# Patient Record
Sex: MALE | Race: WHITE | Employment: FULL TIME | URBAN - NONMETROPOLITAN AREA
[De-identification: names, ages, dates, MRNs, and addresses within clinical notes are randomized per-mention and may not be internally consistent; named-entity substitution may affect disease eponyms.]

---

## 2017-09-08 ENCOUNTER — TELEPHONE (OUTPATIENT)
Dept: NEUROSURGERY | Age: 67
End: 2017-09-08

## 2017-11-06 ENCOUNTER — OFFICE VISIT (OUTPATIENT)
Dept: NEUROLOGY | Age: 67
End: 2017-11-06
Payer: MEDICARE

## 2017-11-06 VITALS
DIASTOLIC BLOOD PRESSURE: 87 MMHG | HEIGHT: 68 IN | HEART RATE: 77 BPM | OXYGEN SATURATION: 98 % | WEIGHT: 206.8 LBS | BODY MASS INDEX: 31.34 KG/M2 | SYSTOLIC BLOOD PRESSURE: 131 MMHG

## 2017-11-06 DIAGNOSIS — Z86.718 HISTORY OF DEEP VEIN THROMBOSIS (DVT) OF LOWER EXTREMITY: ICD-10-CM

## 2017-11-06 DIAGNOSIS — R20.2 PARESTHESIA OF BOTH HANDS: Primary | ICD-10-CM

## 2017-11-06 DIAGNOSIS — E53.8 B12 DEFICIENCY: ICD-10-CM

## 2017-11-06 PROCEDURE — 99204 OFFICE O/P NEW MOD 45 MIN: CPT | Performed by: PSYCHIATRY & NEUROLOGY

## 2017-11-06 RX ORDER — APIXABAN 5 MG/1
5 TABLET, FILM COATED ORAL 2 TIMES DAILY
COMMUNITY
Start: 2017-10-27

## 2017-11-06 RX ORDER — OYSTER SHELL CALCIUM WITH VITAMIN D 500; 200 MG/1; [IU]/1
1 TABLET, FILM COATED ORAL DAILY
COMMUNITY

## 2017-11-06 NOTE — PROGRESS NOTES
Chief Complaint   Patient presents with    New Patient     perypheral neuropathy onset 2 months       Noel Aguila is a 74y.o. year old male who is seen for evaluation of paresthesias in the arms and legs. He relates that his symptoms came on only about 2 months ago. He initially noticed some tingling in his toes as well as into his hands. Over time it has come to involve the arms, shoulders and scapular region as well as the calves. He was found to have a low B12 level which was replaced orally. He states that one month ago his repeat B12 level was supranormal and so he discontinued the replacement area he recently admits to order meat spot for nominal and with head movement. Denies any change in any of his symptoms with temperature elevation nor any bowel bladder difficulties. About 10 years ago he developed neck pain and decreased use of the right hand/arm. Cervical traction improve things. He had a similar thing happened 2 years later. He also has a history of pulmonary embolism following a right leg DVT. He is chronically anticoagulated. He otherwise denies any focal neurological signs or symptoms. No new medications. No obvious exacerbating or alleviating factors. Records are reviewed in detail. .    Active Ambulatory Problems     Diagnosis Date Noted    Screening for colon cancer 10/16/2013     Resolved Ambulatory Problems     Diagnosis Date Noted    No Resolved Ambulatory Problems     Past Medical History:   Diagnosis Date    Cervical radiculopathy     HTN (hypertension)     Hyperlipidemia     Pulmonary emboli ([address]        History reviewed. No pertinent surgical history.     Family History   Problem Relation Age of Onset    Hypertension Father     Heart Disease Father     Elevated Lipids Father        No Known Allergies    Social History     Social History    Marital status:      Spouse name: N/A    Number of children: N/A    Years of education: N/A     Occupational History    Not on

## 2017-11-07 ENCOUNTER — TELEPHONE (OUTPATIENT)
Dept: NEUROSURGERY | Age: 67
End: 2017-11-07

## 2017-11-20 ENCOUNTER — HOSPITAL ENCOUNTER (OUTPATIENT)
Dept: MRI IMAGING | Age: 67
Discharge: HOME | End: 2017-11-20
Payer: MEDICARE

## 2017-11-20 DIAGNOSIS — R20.2 PARESTHESIA OF BOTH HANDS: ICD-10-CM

## 2017-11-20 LAB
GFR NON-AFRICAN AMERICAN: 55
PERFORMED ON: ABNORMAL
POC CREATININE: 1.3 MG/DL (ref 0.3–1.3)
POC SAMPLE TYPE: ABNORMAL

## 2017-11-20 PROCEDURE — 72156 MRI NECK SPINE W/O & W/DYE: CPT

## 2017-11-20 PROCEDURE — 6360000004 HC RX CONTRAST MEDICATION: Performed by: PSYCHIATRY & NEUROLOGY

## 2017-11-20 PROCEDURE — 82565 ASSAY OF CREATININE: CPT

## 2017-11-20 PROCEDURE — A9577 INJ MULTIHANCE: HCPCS | Performed by: PSYCHIATRY & NEUROLOGY

## 2017-11-20 RX ADMIN — GADOBENATE DIMEGLUMINE 18.5 ML: 529 INJECTION, SOLUTION INTRAVENOUS at 08:38

## 2017-11-21 ENCOUNTER — TELEPHONE (OUTPATIENT)
Dept: NEUROSURGERY | Age: 67
End: 2017-11-21

## 2017-11-22 ENCOUNTER — TELEPHONE (OUTPATIENT)
Dept: NEUROSURGERY | Age: 67
End: 2017-11-22

## 2017-12-14 ENCOUNTER — OFFICE VISIT (OUTPATIENT)
Dept: NEUROSURGERY | Age: 67
End: 2017-12-14
Payer: MEDICARE

## 2017-12-14 VITALS
BODY MASS INDEX: 31.07 KG/M2 | HEIGHT: 68 IN | SYSTOLIC BLOOD PRESSURE: 135 MMHG | DIASTOLIC BLOOD PRESSURE: 89 MMHG | HEART RATE: 83 BPM | WEIGHT: 205 LBS | OXYGEN SATURATION: 94 %

## 2017-12-14 DIAGNOSIS — M48.02 CERVICAL STENOSIS OF SPINAL CANAL: ICD-10-CM

## 2017-12-14 DIAGNOSIS — M50.30 DDD (DEGENERATIVE DISC DISEASE), CERVICAL: ICD-10-CM

## 2017-12-14 DIAGNOSIS — R20.2 PARESTHESIA OF BOTH HANDS: Primary | ICD-10-CM

## 2017-12-14 DIAGNOSIS — R20.2 PARESTHESIA OF BOTH FEET: ICD-10-CM

## 2017-12-14 DIAGNOSIS — M48.02 FORAMINAL STENOSIS OF CERVICAL REGION: ICD-10-CM

## 2017-12-14 PROCEDURE — 99204 OFFICE O/P NEW MOD 45 MIN: CPT | Performed by: NEUROLOGICAL SURGERY

## 2017-12-14 PROCEDURE — 3017F COLORECTAL CA SCREEN DOC REV: CPT | Performed by: NEUROLOGICAL SURGERY

## 2017-12-14 PROCEDURE — G8427 DOCREV CUR MEDS BY ELIG CLIN: HCPCS | Performed by: NEUROLOGICAL SURGERY

## 2017-12-14 PROCEDURE — 4040F PNEUMOC VAC/ADMIN/RCVD: CPT | Performed by: NEUROLOGICAL SURGERY

## 2017-12-14 PROCEDURE — G8417 CALC BMI ABV UP PARAM F/U: HCPCS | Performed by: NEUROLOGICAL SURGERY

## 2017-12-14 PROCEDURE — 1036F TOBACCO NON-USER: CPT | Performed by: NEUROLOGICAL SURGERY

## 2017-12-14 PROCEDURE — G8484 FLU IMMUNIZE NO ADMIN: HCPCS | Performed by: NEUROLOGICAL SURGERY

## 2017-12-14 PROCEDURE — 1123F ACP DISCUSS/DSCN MKR DOCD: CPT | Performed by: NEUROLOGICAL SURGERY

## 2017-12-14 RX ORDER — ACETAMINOPHEN 160 MG
TABLET,DISINTEGRATING ORAL
COMMUNITY
End: 2017-12-27

## 2017-12-14 RX ORDER — LANOLIN ALCOHOL/MO/W.PET/CERES
2500 CREAM (GRAM) TOPICAL DAILY
COMMUNITY

## 2017-12-14 ASSESSMENT — ENCOUNTER SYMPTOMS
BLURRED VISION: 1
DOUBLE VISION: 0
EYE DISCHARGE: 0
EYE PAIN: 0
PHOTOPHOBIA: 0
GASTROINTESTINAL NEGATIVE: 1
RESPIRATORY NEGATIVE: 1
EYE REDNESS: 0

## 2017-12-14 NOTE — PROGRESS NOTES
 fenofibrate 160 MG tablet Take 160 mg by mouth daily.  therapeutic multivitamin-minerals (THERAGRAN-M) tablet Take 1 tablet by mouth daily.  valsartan (DIOVAN) 160 MG tablet Take 160 mg by mouth daily.  ezetimibe (ZETIA) 10 MG tablet Take 10 mg by mouth daily. No current facility-administered medications for this visit. Allergies:  Review of patient's allergies indicates no known allergies. Social History:   History   Smoking Status    Never Smoker   Smokeless Tobacco    Never Used     History   Alcohol Use No         Family History:   Family History   Problem Relation Age of Onset    Hypertension Father     Heart Disease Father     Elevated Lipids Father        REVIEW OF SYSTEMS:  Review of Systems   Constitutional: Negative for chills, diaphoresis, fever, malaise/fatigue and weight loss. HENT: Negative. Eyes: Positive for blurred vision (right eye - cataract). Negative for double vision, photophobia, pain, discharge and redness. Respiratory: Negative. Cardiovascular: Negative. Gastrointestinal: Negative. Genitourinary: Negative. Musculoskeletal: Negative. Skin: Negative. Neurological: Positive for tingling and weakness. Negative for dizziness, tremors, sensory change, speech change, focal weakness, seizures, loss of consciousness and headaches. Endo/Heme/Allergies: Negative. Psychiatric/Behavioral: Negative. PHYSICAL EXAM:  Vitals:    12/14/17 0829   BP: 135/89   Pulse: 83   SpO2: 94%     Constitutional: appears well-developed and well-nourished.    Eyes  conjunctiva normal.  Pupils react to light  Ear, nose, throat -hearing intact to finger rub, No scars, masses, or lesions over external nose or ears, no atrophy of tongue  Neck-symmetric, no masses noted, no jugular vein distension  Respiration- chest wall appears symmetric, good expansion, normal effort without use of accessory muscles  Musculoskeletal  no significant wasting of muscles noted, no bony deformities, gait no gross ataxia  Extremities-no clubbing, cyanosis or edema  Skin  warm, dry, and intact. No rash, erythema, or pallor. Psychiatric  mood, affect, and behavior appear normal.     Neurologic Examinaiton  Awake, Alert and oriented x 3  Normal speech pattern, following commands  Motor 5/5 all extremities  No deficits to light touch or pinprick sensation  Reflexes are 2+ and symmetric  No clonus or Hoffmans sign  No myofacial tenderness to palpation  Normal Gait pattern  Can walk in Tandem    DATA and IMAGING:    Nursing/pcp notes, imaging, labs, and vitals reviewed. PT,OT and/or speech notes reviewed    No results found for: WBC, HGB, HCT, MCV, PLT  Lab Results   Component Value Date    CREATININE 1.3 11/20/2017    LABGLOM 55 (A) 11/20/2017   No results found for: INR, PROTIME      Narrative   Examination. MRI CERVICAL SPINE W WO CONTRAST   History: Paresthesia of both hands. The multiplanar, multisequence MR imaging of the cervical spine is   performed before and after intravenous contrast enhancement. There is no previous study for comparison. There is reversal of cervical lordosis. The vertebral body heights are normal.   There is loss of height and signal of the intervertebral disks at C5-6   and C6-7 suggesting degeneration/desiccation of the disc. The   remaining intervertebral discs appear unremarkable. There are   discogenic degeneration of the endplates at M6-5. C2, space C2-3. There is moderate facet arthropathy. The neural   foramina spinal canal are normal.   C3, space C3-4. There is mild predominantly central disc bulging. There is bilateral facet arthropathy. The neural foramina spinal canal   are patent. C4, space C4-5. There is mild disc bulging. There is bilateral facet   arthropathy. There is a small type 2 extradural meningeal cyst in the   left common measuring 5 mm in diameter.  There is a type I extradural   meningeal cyst in the right neural foramen measuring 4 mm in diameter. No evidence of a spinal stenosis. C5, space C5-C6. There is moderate diffuse disc bulging. There are   prominent uncinate spurs bilaterally. There is bilateral facet   arthropathy. There is bilateral neural foraminal stenosis. There is   type 2 extradural meningeal cysts, measuring 5 mm in diameter in the   right neural foramen. There is abnormal signal in the intervertebral   disc with moderate irregular contrast enhancement. This may represent   scarring, degeneration or chronic inflammatory process. No evidence of   epidural fluid accumulation or a mass/mass effect. There is a large   anterior osteophyte at this level. No surrounding soft tissues are   fluid accumulation or abnormal enhancement. C6, space C6-C7. There is moderate diffuse disc bulging. There is   bilateral facet arthropathy. There is bilateral neural foraminal   stenosis. No significant spinal stenosis. C7, space C7-T1. There are bilateral extradural meningeal cysts type 2   measuring 5 mm in diameter each. There is a mild disc bulging. No   significant compression on the thecal sac. There is bilateral facet   arthropathy. The size and signal of the cervical spinal cord appear normal. No   focal intrinsic abnormal signal or abnormal enhancement. No evidence   of focal enlargement or expansion. The visualized adina, the medulla oblongata and cerebellum appear   normal.   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 The ligaments are intact. Prevertebral soft tissues are normal.       Impression   Cervical spondylosis most severe at C5-6 and C6-C7. Bilateral neural foraminal stenosis at C5-C6 and C6-C7. The details   are given above. 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 The details are given above. No significant spinal stenosis at any level.    The changes at the disc C5-C6 may represent

## 2017-12-27 ENCOUNTER — OFFICE VISIT (OUTPATIENT)
Dept: NEUROLOGY | Age: 67
End: 2017-12-27
Payer: MEDICARE

## 2017-12-27 VITALS
SYSTOLIC BLOOD PRESSURE: 123 MMHG | BODY MASS INDEX: 32.13 KG/M2 | HEIGHT: 68 IN | DIASTOLIC BLOOD PRESSURE: 79 MMHG | WEIGHT: 212 LBS

## 2017-12-27 DIAGNOSIS — R20.2 PARESTHESIA OF BOTH FEET: ICD-10-CM

## 2017-12-27 DIAGNOSIS — R20.2 PARESTHESIA OF BOTH HANDS: Primary | ICD-10-CM

## 2017-12-27 DIAGNOSIS — M43.02 CERVICAL SPONDYLOLYSIS: ICD-10-CM

## 2017-12-27 LAB — SEDIMENTATION RATE, ERYTHROCYTE: 10 MM/HR (ref 0–15)

## 2017-12-27 PROCEDURE — 99214 OFFICE O/P EST MOD 30 MIN: CPT | Performed by: PSYCHIATRY & NEUROLOGY

## 2017-12-27 PROCEDURE — 1123F ACP DISCUSS/DSCN MKR DOCD: CPT | Performed by: PSYCHIATRY & NEUROLOGY

## 2017-12-27 PROCEDURE — 3017F COLORECTAL CA SCREEN DOC REV: CPT | Performed by: PSYCHIATRY & NEUROLOGY

## 2017-12-27 PROCEDURE — 4040F PNEUMOC VAC/ADMIN/RCVD: CPT | Performed by: PSYCHIATRY & NEUROLOGY

## 2017-12-27 PROCEDURE — 1036F TOBACCO NON-USER: CPT | Performed by: PSYCHIATRY & NEUROLOGY

## 2017-12-27 PROCEDURE — G8417 CALC BMI ABV UP PARAM F/U: HCPCS | Performed by: PSYCHIATRY & NEUROLOGY

## 2017-12-27 PROCEDURE — G8484 FLU IMMUNIZE NO ADMIN: HCPCS | Performed by: PSYCHIATRY & NEUROLOGY

## 2017-12-27 PROCEDURE — G8427 DOCREV CUR MEDS BY ELIG CLIN: HCPCS | Performed by: PSYCHIATRY & NEUROLOGY

## 2017-12-27 RX ORDER — VALSARTAN 40 MG/1
40 TABLET ORAL DAILY
COMMUNITY

## 2017-12-27 NOTE — PROGRESS NOTES
Chief Complaint   Patient presents with    1 Month Follow-Up     neurosurgery wasn't able to do anything       Francois Parks is a 74y.o. year old male who is seen for evaluation of paresthesias in the arms and legs. He relates that his symptoms came on only about 2-3 months ago. He initially noticed some tingling in his toes as well as into his hands. Over time it has come to involve the arms, shoulders and scapular region as well as the calves. He was found to have a low B12 level which was replaced orally. He states that one month ago his repeat B12 level was supranormal and so he discontinued the replacement area he recently admits to order meat spot for nominal and with head movement. Denies any change in any of his symptoms with temperature elevation nor any bowel bladder difficulties. About 10 years ago he developed neck pain and decreased use of the right hand/arm. Cervical traction improve things. He had a similar thing happened 2 years later. He also has a history of pulmonary embolism following a right leg DVT. He is chronically anticoagulated. He otherwise denies any focal neurological signs or symptoms. No new medications. No obvious exacerbating or alleviating factors. Recent cervical MRI films are reviewed. He has some significant neural foraminal stenosis but nothing central. I sent him to Dr. couch in neurosurgery who did not feel that he had a cervical spine problem. He felt that the patient's problem was one more of neuropathy. The patient has had a normal hemoglobin A1c in the past. Records are reviewed in detail. .    Active Ambulatory Problems     Diagnosis Date Noted    Screening for colon cancer 10/16/2013     Resolved Ambulatory Problems     Diagnosis Date Noted    No Resolved Ambulatory Problems     Past Medical History:   Diagnosis Date    Cervical radiculopathy     HTN (hypertension)     Hyperlipidemia     Pulmonary emboli ([address]        History reviewed.  No pertinent surgical history. Family History   Problem Relation Age of Onset    Hypertension Father     Heart Disease Father     Elevated Lipids Father        No Known Allergies    Social History     Social History    Marital status:      Spouse name: N/A    Number of children: N/A    Years of education: N/A     Occupational History    Not on file.      Social History Main Topics    Smoking status: Never Smoker    Smokeless tobacco: Never Used    Alcohol use No    Drug use: No    Sexual activity: Not on file     Other Topics Concern    Not on file     Social History Narrative    No narrative on file       Review of Systems  Constitutional: []Fever []Sweats []Chills [] Recent Injury   [x] Denies all unless marked  HENT:[]Headache  [] Head Injury  [] Sore Throat  [] Ear Pain  [] Dizziness [] Hearing Loss   [x] Denies all unless marked  Spine:  [] Neck pain  [] Back pain  [] Sciaticia  [x] Denies all unless marked  Cardiovascular:[]Chest Pain []Palpitations [] Heart Disease  [x] Denies all unless marked  Pulmonary: []Shortness of Breath []Cough   [x] Denies all unless marked  Gastrointestinal:  []Abdominal Pain  []Blood in Stool  []Diarrhea []Constipation []Nausea  []Vomiting  [x] Denies all unless marked  Genitourinary:  [] Dysuria [] Frequency  [] Incontinence [] Urgency   [x] Denies all unless marked  Musculoskeletal: [] Arthralgia  [] Myalgias [] Muscle cramps  [] Muscle twitches   [x] Denies all unless marked   Extremities:   [] Pain   [] Swelling   [x] Denies all unless marked  Skin:[] Rash  [] Color Change  [x] Denies all unless marked  Neurological:[] Visual Disturbance [] Double Vision [] Slurred Speech [] Trouble swallowing  [] Vertigo [x] Tingling [x] Numbness [] Weakness [] Loss of Balance   [] Loss of Consciousness [] Memory Loss  [] Denies all unless marked  Psychiatric/Behavioral:[] Depression [] Anxiety  [x] Denies all unless marked  Sleep: []  Insomnia [] Sleep Disturbance [] Snoring [] Restless Legs [] Daytime Sleepiness [] Sleep Apnea  [x] Denies all unless marked       Current Outpatient Prescriptions   Medication Sig Dispense Refill    valsartan (DIOVAN) 40 MG tablet Take 40 mg by mouth daily      vitamin B-12 (CYANOCOBALAMIN) 1000 MCG tablet Take 2,500 mcg by mouth daily      ELIQUIS 5 MG TABS tablet Take 5 mg by mouth 2 times daily      calcium-vitamin D (OSCAL-500) 500-200 MG-UNIT per tablet Take 1 tablet by mouth daily      omega-3 acid ethyl esters (LOVAZA) 1 G capsule Take 2 g by mouth 2 times daily.  fenofibrate 160 MG tablet Take 160 mg by mouth daily.  therapeutic multivitamin-minerals (THERAGRAN-M) tablet Take 1 tablet by mouth daily.  ezetimibe (ZETIA) 10 MG tablet Take 10 mg by mouth daily. No current facility-administered medications for this visit. /79   Ht 5' 8\" (1.727 m)   Wt 212 lb (96.2 kg)   BMI 32.23 kg/m²       Constitutional  well developed, well nourished. Eyes  conjunctiva normal.  Pupils react to light  Ear, nose, throat -hearing intact to finger rub No scars, masses, or lesions over external nose or ears, no atrophy of tongue  Neck-symmetric, no masses noted, no jugular vein distension. No bruits noted. Respiration- chest wall appears symmetric, good expansion,   normal effort without use of accessory muscles  Cardiovascular- RRR  Musculoskeletal  no significant wasting of muscles noted, no bony deformities, gait no gross ataxia  Extremities-no clubbing, cyanosis or edema  Skin  warm, dry, and intact. No rash, erythema, or pallor. Psychiatric  mood, affect, and behavior appear normal.      Neurological exam  Awake, alert, fluent oriented x 3 appropriate affect  Attention and concentration appear appropriate  Recent and remote memory appears unremarkable  Speech normal without dysarthria  No clear issues with language of fund of knowledge    Cranial Nerve Exam   CN II- Visual fields grossly unremarkable. VA adequate.  CN III,

## 2017-12-29 LAB
COPPER: 95 UG/DL (ref 70–140)
GLIADIN ANTIBODIES IGA: 12 UNITS (ref 0–19)
GLIADIN ANTIBODIES IGG: 2 UNITS (ref 0–19)

## 2017-12-30 LAB
ALBUMIN SERPL-MCNC: 4.43 G/DL (ref 3.75–5.01)
ALPHA-1-GLOBULIN: 0.3 G/DL (ref 0.19–0.46)
ALPHA-2-GLOBULIN: 0.65 G/DL (ref 0.48–1.05)
BETA GLOBULIN: 0.95 G/DL (ref 0.48–1.1)
GAMMA GLOBULIN: 1.07 G/DL (ref 0.62–1.51)
IMMUNOFIXATION REFLEX: NORMAL
METHYLMALONIC ACID: 0.19 UMOL/L (ref 0–0.4)
SPE/IFE INTERPRETATION: NORMAL
TOTAL PROTEIN: 7.4 G/DL (ref 6–8.3)
VITAMIN B6: 26.1 NMOL/L (ref 20–125)

## 2018-01-02 ENCOUNTER — HOSPITAL ENCOUNTER (OUTPATIENT)
Dept: NEUROLOGY | Age: 68
Discharge: HOME | End: 2018-01-02
Payer: MEDICARE

## 2018-01-02 DIAGNOSIS — R20.2 PARESTHESIA OF BOTH FEET: ICD-10-CM

## 2018-01-09 ENCOUNTER — HOSPITAL ENCOUNTER (OUTPATIENT)
Dept: NEUROLOGY | Age: 68
Discharge: HOME | End: 2018-01-09
Payer: MEDICARE

## 2018-01-09 PROCEDURE — 95913 NRV CNDJ TEST 13/> STUDIES: CPT | Performed by: PSYCHIATRY & NEUROLOGY

## 2018-01-09 PROCEDURE — 95913 NRV CNDJ TEST 13/> STUDIES: CPT

## 2018-01-09 PROCEDURE — 95886 MUSC TEST DONE W/N TEST COMP: CPT

## 2018-01-09 PROCEDURE — 95886 MUSC TEST DONE W/N TEST COMP: CPT | Performed by: PSYCHIATRY & NEUROLOGY

## 2020-10-24 ENCOUNTER — HOSPITAL ENCOUNTER (EMERGENCY)
Facility: HOSPITAL | Age: 70
Discharge: HOME | End: 2020-10-24
Attending: EMERGENCY MEDICINE | Admitting: EMERGENCY MEDICINE

## 2020-10-24 ENCOUNTER — APPOINTMENT (OUTPATIENT)
Dept: GENERAL RADIOLOGY | Facility: HOSPITAL | Age: 70
End: 2020-10-24

## 2020-10-24 VITALS
HEIGHT: 68 IN | HEART RATE: 88 BPM | TEMPERATURE: 98.2 F | WEIGHT: 200 LBS | OXYGEN SATURATION: 98 % | RESPIRATION RATE: 18 BRPM | BODY MASS INDEX: 30.31 KG/M2 | DIASTOLIC BLOOD PRESSURE: 88 MMHG | SYSTOLIC BLOOD PRESSURE: 135 MMHG

## 2020-10-24 DIAGNOSIS — L03.90 CELLULITIS, UNSPECIFIED CELLULITIS SITE: Primary | ICD-10-CM

## 2020-10-24 LAB
ALBUMIN SERPL-MCNC: 4.5 G/DL (ref 3.5–5.2)
ALBUMIN/GLOB SERPL: 1.6 G/DL
ALP SERPL-CCNC: 50 U/L (ref 39–117)
ALT SERPL W P-5'-P-CCNC: 18 U/L (ref 1–41)
ANION GAP SERPL CALCULATED.3IONS-SCNC: 7 MMOL/L (ref 5–15)
AST SERPL-CCNC: 18 U/L (ref 1–40)
BASOPHILS # BLD AUTO: 0.01 10*3/MM3 (ref 0–0.2)
BASOPHILS NFR BLD AUTO: 0.1 % (ref 0–1.5)
BILIRUB SERPL-MCNC: 0.3 MG/DL (ref 0–1.2)
BUN SERPL-MCNC: 22 MG/DL (ref 8–23)
BUN/CREAT SERPL: 16.5 (ref 7–25)
CALCIUM SPEC-SCNC: 9.7 MG/DL (ref 8.6–10.5)
CHLORIDE SERPL-SCNC: 98 MMOL/L (ref 98–107)
CO2 SERPL-SCNC: 31 MMOL/L (ref 22–29)
CREAT SERPL-MCNC: 1.33 MG/DL (ref 0.76–1.27)
DEPRECATED RDW RBC AUTO: 37.7 FL (ref 37–54)
EOSINOPHIL # BLD AUTO: 0.14 10*3/MM3 (ref 0–0.4)
EOSINOPHIL NFR BLD AUTO: 1.8 % (ref 0.3–6.2)
ERYTHROCYTE [DISTWIDTH] IN BLOOD BY AUTOMATED COUNT: 11.9 % (ref 12.3–15.4)
GFR SERPL CREATININE-BSD FRML MDRD: 53 ML/MIN/1.73
GLOBULIN UR ELPH-MCNC: 2.8 GM/DL
GLUCOSE SERPL-MCNC: 134 MG/DL (ref 65–99)
HCT VFR BLD AUTO: 41.4 % (ref 37.5–51)
HGB BLD-MCNC: 14.4 G/DL (ref 13–17.7)
HOLD SPECIMEN: NORMAL
HOLD SPECIMEN: NORMAL
IMM GRANULOCYTES # BLD AUTO: 0.01 10*3/MM3 (ref 0–0.05)
IMM GRANULOCYTES NFR BLD AUTO: 0.1 % (ref 0–0.5)
LYMPHOCYTES # BLD AUTO: 1.87 10*3/MM3 (ref 0.7–3.1)
LYMPHOCYTES NFR BLD AUTO: 23.5 % (ref 19.6–45.3)
MCH RBC QN AUTO: 30 PG (ref 26.6–33)
MCHC RBC AUTO-ENTMCNC: 34.8 G/DL (ref 31.5–35.7)
MCV RBC AUTO: 86.3 FL (ref 79–97)
MONOCYTES # BLD AUTO: 0.95 10*3/MM3 (ref 0.1–0.9)
MONOCYTES NFR BLD AUTO: 11.9 % (ref 5–12)
NEUTROPHILS NFR BLD AUTO: 4.99 10*3/MM3 (ref 1.7–7)
NEUTROPHILS NFR BLD AUTO: 62.6 % (ref 42.7–76)
NRBC BLD AUTO-RTO: 0 /100 WBC (ref 0–0.2)
PLATELET # BLD AUTO: 217 10*3/MM3 (ref 140–450)
PMV BLD AUTO: 10.4 FL (ref 6–12)
POTASSIUM SERPL-SCNC: 4.1 MMOL/L (ref 3.5–5.2)
PROT SERPL-MCNC: 7.3 G/DL (ref 6–8.5)
RBC # BLD AUTO: 4.8 10*6/MM3 (ref 4.14–5.8)
SODIUM SERPL-SCNC: 136 MMOL/L (ref 136–145)
URATE SERPL-MCNC: 9.1 MG/DL (ref 3.4–7)
WBC # BLD AUTO: 7.97 10*3/MM3 (ref 3.4–10.8)
WHOLE BLOOD HOLD SPECIMEN: NORMAL
WHOLE BLOOD HOLD SPECIMEN: NORMAL

## 2020-10-24 PROCEDURE — 99283 EMERGENCY DEPT VISIT LOW MDM: CPT

## 2020-10-24 PROCEDURE — 80053 COMPREHEN METABOLIC PANEL: CPT | Performed by: EMERGENCY MEDICINE

## 2020-10-24 PROCEDURE — 85025 COMPLETE CBC W/AUTO DIFF WBC: CPT | Performed by: EMERGENCY MEDICINE

## 2020-10-24 PROCEDURE — 73630 X-RAY EXAM OF FOOT: CPT

## 2020-10-24 PROCEDURE — 84550 ASSAY OF BLOOD/URIC ACID: CPT | Performed by: EMERGENCY MEDICINE

## 2020-10-24 RX ORDER — DOXYCYCLINE 100 MG/1
100 CAPSULE ORAL 2 TIMES DAILY
Qty: 14 CAPSULE | Refills: 0 | Status: ON HOLD | OUTPATIENT
Start: 2020-10-24 | End: 2021-01-01

## 2020-10-24 RX ORDER — METHYLPREDNISOLONE 4 MG/1
TABLET ORAL
Qty: 21 EACH | Refills: 0 | Status: ON HOLD | OUTPATIENT
Start: 2020-10-24 | End: 2021-01-01

## 2021-01-01 ENCOUNTER — APPOINTMENT (OUTPATIENT)
Dept: GENERAL RADIOLOGY | Facility: HOSPITAL | Age: 71
End: 2021-01-01

## 2021-01-01 ENCOUNTER — APPOINTMENT (OUTPATIENT)
Dept: CARDIOLOGY | Facility: HOSPITAL | Age: 71
End: 2021-01-01

## 2021-01-01 ENCOUNTER — APPOINTMENT (OUTPATIENT)
Dept: ULTRASOUND IMAGING | Facility: HOSPITAL | Age: 71
End: 2021-01-01

## 2021-01-01 ENCOUNTER — APPOINTMENT (OUTPATIENT)
Dept: CT IMAGING | Facility: HOSPITAL | Age: 71
End: 2021-01-01

## 2021-01-01 ENCOUNTER — HOSPITAL ENCOUNTER (INPATIENT)
Facility: HOSPITAL | Age: 71
LOS: 21 days | End: 2021-12-06
Attending: FAMILY MEDICINE | Admitting: FAMILY MEDICINE

## 2021-01-01 VITALS
HEART RATE: 96 BPM | DIASTOLIC BLOOD PRESSURE: 53 MMHG | SYSTOLIC BLOOD PRESSURE: 106 MMHG | BODY MASS INDEX: 29.34 KG/M2 | TEMPERATURE: 96.8 F | HEIGHT: 68 IN | RESPIRATION RATE: 32 BRPM | WEIGHT: 193.56 LBS | OXYGEN SATURATION: 76 %

## 2021-01-01 DIAGNOSIS — E78.5 HYPERLIPIDEMIA, UNSPECIFIED HYPERLIPIDEMIA TYPE: Chronic | ICD-10-CM

## 2021-01-01 DIAGNOSIS — R73.9 HYPERGLYCEMIA: ICD-10-CM

## 2021-01-01 DIAGNOSIS — J12.82 PNEUMONIA DUE TO COVID-19 VIRUS: Primary | ICD-10-CM

## 2021-01-01 DIAGNOSIS — I10 ESSENTIAL HYPERTENSION: Chronic | ICD-10-CM

## 2021-01-01 DIAGNOSIS — I82.4Z1 ACUTE VENOUS EMBOLISM AND THROMBOSIS OF DEEP VESSELS OF DISTAL END OF RIGHT LOWER EXTREMITY (HCC): ICD-10-CM

## 2021-01-01 DIAGNOSIS — U07.1 PNEUMONIA DUE TO COVID-19 VIRUS: Primary | ICD-10-CM

## 2021-01-01 DIAGNOSIS — Z86.711 HISTORY OF PULMONARY EMBOLISM: ICD-10-CM

## 2021-01-01 DIAGNOSIS — R09.02 HYPOXEMIA: ICD-10-CM

## 2021-01-01 DIAGNOSIS — I26.02 ACUTE SADDLE PULMONARY EMBOLISM WITH ACUTE COR PULMONALE (HCC): ICD-10-CM

## 2021-01-01 DIAGNOSIS — J96.01 ACUTE RESPIRATORY FAILURE WITH HYPOXIA (HCC): ICD-10-CM

## 2021-01-01 DIAGNOSIS — R06.00 DYSPNEA, UNSPECIFIED TYPE: ICD-10-CM

## 2021-01-01 LAB
A-A DO2: 16.3 MMHG
A-A DO2: 588.9 MMHG
ALBUMIN SERPL-MCNC: 2.5 G/DL (ref 3.5–5.2)
ALBUMIN SERPL-MCNC: 2.6 G/DL (ref 3.5–5.2)
ALBUMIN SERPL-MCNC: 2.6 G/DL (ref 3.5–5.2)
ALBUMIN SERPL-MCNC: 2.7 G/DL (ref 3.5–5.2)
ALBUMIN SERPL-MCNC: 2.8 G/DL (ref 3.5–5.2)
ALBUMIN SERPL-MCNC: 2.9 G/DL (ref 3.5–5.2)
ALBUMIN SERPL-MCNC: 2.9 G/DL (ref 3.5–5.2)
ALBUMIN SERPL-MCNC: 3 G/DL (ref 3.5–5.2)
ALBUMIN SERPL-MCNC: 3.2 G/DL (ref 3.5–5.2)
ALBUMIN SERPL-MCNC: 3.2 G/DL (ref 3.5–5.2)
ALBUMIN SERPL-MCNC: 3.3 G/DL (ref 3.5–5.2)
ALBUMIN SERPL-MCNC: 3.4 G/DL (ref 3.5–5.2)
ALBUMIN SERPL-MCNC: 3.4 G/DL (ref 3.5–5.2)
ALBUMIN SERPL-MCNC: 3.5 G/DL (ref 3.5–5.2)
ALBUMIN SERPL-MCNC: 3.5 G/DL (ref 3.5–5.2)
ALBUMIN SERPL-MCNC: 3.6 G/DL (ref 3.5–5.2)
ALBUMIN SERPL-MCNC: 3.7 G/DL (ref 3.5–5.2)
ALBUMIN SERPL-MCNC: 3.7 G/DL (ref 3.5–5.2)
ALBUMIN/GLOB SERPL: 0.9 G/DL
ALBUMIN/GLOB SERPL: 0.9 G/DL
ALBUMIN/GLOB SERPL: 1 G/DL
ALBUMIN/GLOB SERPL: 1.1 G/DL
ALBUMIN/GLOB SERPL: 1.2 G/DL
ALBUMIN/GLOB SERPL: 1.3 G/DL
ALBUMIN/GLOB SERPL: 1.4 G/DL
ALP SERPL-CCNC: 105 U/L (ref 39–117)
ALP SERPL-CCNC: 112 U/L (ref 39–117)
ALP SERPL-CCNC: 113 U/L (ref 39–117)
ALP SERPL-CCNC: 116 U/L (ref 39–117)
ALP SERPL-CCNC: 132 U/L (ref 39–117)
ALP SERPL-CCNC: 147 U/L (ref 39–117)
ALP SERPL-CCNC: 149 U/L (ref 39–117)
ALP SERPL-CCNC: 154 U/L (ref 39–117)
ALP SERPL-CCNC: 54 U/L (ref 39–117)
ALP SERPL-CCNC: 55 U/L (ref 39–117)
ALP SERPL-CCNC: 60 U/L (ref 39–117)
ALP SERPL-CCNC: 62 U/L (ref 39–117)
ALP SERPL-CCNC: 63 U/L (ref 39–117)
ALP SERPL-CCNC: 79 U/L (ref 39–117)
ALP SERPL-CCNC: 79 U/L (ref 39–117)
ALP SERPL-CCNC: 89 U/L (ref 39–117)
ALP SERPL-CCNC: 91 U/L (ref 39–117)
ALP SERPL-CCNC: 98 U/L (ref 39–117)
ALT SERPL W P-5'-P-CCNC: 22 U/L (ref 1–41)
ALT SERPL W P-5'-P-CCNC: 22 U/L (ref 1–41)
ALT SERPL W P-5'-P-CCNC: 23 U/L (ref 1–41)
ALT SERPL W P-5'-P-CCNC: 25 U/L (ref 1–41)
ALT SERPL W P-5'-P-CCNC: 26 U/L (ref 1–41)
ALT SERPL W P-5'-P-CCNC: 27 U/L (ref 1–41)
ALT SERPL W P-5'-P-CCNC: 28 U/L (ref 1–41)
ALT SERPL W P-5'-P-CCNC: 28 U/L (ref 1–41)
ALT SERPL W P-5'-P-CCNC: 29 U/L (ref 1–41)
ALT SERPL W P-5'-P-CCNC: 31 U/L (ref 1–41)
ALT SERPL W P-5'-P-CCNC: 32 U/L (ref 1–41)
ALT SERPL W P-5'-P-CCNC: 37 U/L (ref 1–41)
ALT SERPL W P-5'-P-CCNC: 41 U/L (ref 1–41)
ALT SERPL W P-5'-P-CCNC: 50 U/L (ref 1–41)
ALT SERPL W P-5'-P-CCNC: 60 U/L (ref 1–41)
ALT SERPL W P-5'-P-CCNC: >7000 U/L (ref 1–41)
ALT SERPL W P-5'-P-CCNC: >7000 U/L (ref 1–41)
ANION GAP SERPL CALCULATED.3IONS-SCNC: 10 MMOL/L (ref 5–15)
ANION GAP SERPL CALCULATED.3IONS-SCNC: 10 MMOL/L (ref 5–15)
ANION GAP SERPL CALCULATED.3IONS-SCNC: 11 MMOL/L (ref 5–15)
ANION GAP SERPL CALCULATED.3IONS-SCNC: 12 MMOL/L (ref 5–15)
ANION GAP SERPL CALCULATED.3IONS-SCNC: 13 MMOL/L (ref 5–15)
ANION GAP SERPL CALCULATED.3IONS-SCNC: 14 MMOL/L (ref 5–15)
ANION GAP SERPL CALCULATED.3IONS-SCNC: 15 MMOL/L (ref 5–15)
ANION GAP SERPL CALCULATED.3IONS-SCNC: 16 MMOL/L (ref 5–15)
ANION GAP SERPL CALCULATED.3IONS-SCNC: 18 MMOL/L (ref 5–15)
ANION GAP SERPL CALCULATED.3IONS-SCNC: 19 MMOL/L (ref 5–15)
ANION GAP SERPL CALCULATED.3IONS-SCNC: 19 MMOL/L (ref 5–15)
ANION GAP SERPL CALCULATED.3IONS-SCNC: 20 MMOL/L (ref 5–15)
ANION GAP SERPL CALCULATED.3IONS-SCNC: 21 MMOL/L (ref 5–15)
ANION GAP SERPL CALCULATED.3IONS-SCNC: 5 MMOL/L (ref 5–15)
ANION GAP SERPL CALCULATED.3IONS-SCNC: 6 MMOL/L (ref 5–15)
ANION GAP SERPL CALCULATED.3IONS-SCNC: 6 MMOL/L (ref 5–15)
ANION GAP SERPL CALCULATED.3IONS-SCNC: 7 MMOL/L (ref 5–15)
ANION GAP SERPL CALCULATED.3IONS-SCNC: 8 MMOL/L (ref 5–15)
ANION GAP SERPL CALCULATED.3IONS-SCNC: 8 MMOL/L (ref 5–15)
ANION GAP SERPL CALCULATED.3IONS-SCNC: 9 MMOL/L (ref 5–15)
ANISOCYTOSIS BLD QL: ABNORMAL
ARTERIAL PATENCY WRIST A: ABNORMAL
ARTERIAL PATENCY WRIST A: POSITIVE
AST SERPL-CCNC: 25 U/L (ref 1–40)
AST SERPL-CCNC: 29 U/L (ref 1–40)
AST SERPL-CCNC: 30 U/L (ref 1–40)
AST SERPL-CCNC: 31 U/L (ref 1–40)
AST SERPL-CCNC: 34 U/L (ref 1–40)
AST SERPL-CCNC: 35 U/L (ref 1–40)
AST SERPL-CCNC: 36 U/L (ref 1–40)
AST SERPL-CCNC: 37 U/L (ref 1–40)
AST SERPL-CCNC: 38 U/L (ref 1–40)
AST SERPL-CCNC: 38 U/L (ref 1–40)
AST SERPL-CCNC: 40 U/L (ref 1–40)
AST SERPL-CCNC: 41 U/L (ref 1–40)
AST SERPL-CCNC: 47 U/L (ref 1–40)
AST SERPL-CCNC: 47 U/L (ref 1–40)
AST SERPL-CCNC: 49 U/L (ref 1–40)
AST SERPL-CCNC: 50 U/L (ref 1–40)
AST SERPL-CCNC: >7000 U/L (ref 1–40)
AST SERPL-CCNC: >7000 U/L (ref 1–40)
ATMOSPHERIC PRESS: 745 MMHG
ATMOSPHERIC PRESS: 749 MMHG
ATMOSPHERIC PRESS: 750 MMHG
ATMOSPHERIC PRESS: 751 MMHG
ATMOSPHERIC PRESS: 751 MMHG
ATMOSPHERIC PRESS: 752 MMHG
ATMOSPHERIC PRESS: 753 MMHG
ATMOSPHERIC PRESS: 754 MMHG
ATMOSPHERIC PRESS: 754 MMHG
ATMOSPHERIC PRESS: 755 MMHG
ATMOSPHERIC PRESS: 759 MMHG
B PARAPERT DNA SPEC QL NAA+PROBE: NOT DETECTED
B PERT DNA SPEC QL NAA+PROBE: NOT DETECTED
BACTERIA SPEC AEROBE CULT: NORMAL
BACTERIA SPEC RESP CULT: ABNORMAL
BASE EXCESS BLDA CALC-SCNC: -0.3 MMOL/L (ref 0–2)
BASE EXCESS BLDA CALC-SCNC: -0.5 MMOL/L (ref 0–2)
BASE EXCESS BLDA CALC-SCNC: -1.2 MMOL/L (ref 0–2)
BASE EXCESS BLDA CALC-SCNC: -1.7 MMOL/L (ref 0–2)
BASE EXCESS BLDA CALC-SCNC: -1.7 MMOL/L (ref 0–2)
BASE EXCESS BLDA CALC-SCNC: -3.4 MMOL/L (ref 0–2)
BASE EXCESS BLDA CALC-SCNC: -8 MMOL/L (ref 0–2)
BASE EXCESS BLDA CALC-SCNC: -9.7 MMOL/L (ref 0–2)
BASE EXCESS BLDA CALC-SCNC: 1.3 MMOL/L (ref 0–2)
BASE EXCESS BLDA CALC-SCNC: 1.3 MMOL/L (ref 0–2)
BASE EXCESS BLDA CALC-SCNC: 2 MMOL/L (ref 0–2)
BASE EXCESS BLDA CALC-SCNC: 2.5 MMOL/L (ref 0–2)
BASE EXCESS BLDA CALC-SCNC: 2.9 MMOL/L (ref 0–2)
BASE EXCESS BLDA CALC-SCNC: 3.9 MMOL/L (ref 0–2)
BASE EXCESS BLDA CALC-SCNC: 4 MMOL/L (ref 0–2)
BASE EXCESS BLDA CALC-SCNC: 8.5 MMOL/L (ref 0–2)
BASO STIPL COARSE BLD QL SMEAR: ABNORMAL
BASO STIPL COARSE BLD QL SMEAR: ABNORMAL
BASOPHILS # BLD AUTO: 0 10*3/MM3 (ref 0–0.2)
BASOPHILS # BLD AUTO: 0.01 10*3/MM3 (ref 0–0.2)
BASOPHILS # BLD AUTO: 0.02 10*3/MM3 (ref 0–0.2)
BASOPHILS # BLD AUTO: 0.03 10*3/MM3 (ref 0–0.2)
BASOPHILS # BLD AUTO: 0.04 10*3/MM3 (ref 0–0.2)
BASOPHILS # BLD AUTO: 0.06 10*3/MM3 (ref 0–0.2)
BASOPHILS # BLD AUTO: 0.07 10*3/MM3 (ref 0–0.2)
BASOPHILS # BLD MANUAL: 0.13 10*3/MM3 (ref 0–0.2)
BASOPHILS NFR BLD AUTO: 0 % (ref 0–1.5)
BASOPHILS NFR BLD AUTO: 0.1 % (ref 0–1.5)
BASOPHILS NFR BLD AUTO: 0.2 % (ref 0–1.5)
BASOPHILS NFR BLD AUTO: 0.3 % (ref 0–1.5)
BASOPHILS NFR BLD MANUAL: 1 % (ref 0–1.5)
BDY SITE: ABNORMAL
BH CV ECHO MEAS - AO MAX PG (FULL): 3.3 MMHG
BH CV ECHO MEAS - AO MAX PG: 8.1 MMHG
BH CV ECHO MEAS - AO MEAN PG (FULL): 1 MMHG
BH CV ECHO MEAS - AO MEAN PG: 3 MMHG
BH CV ECHO MEAS - AO ROOT AREA (BSA CORRECTED): 1.9
BH CV ECHO MEAS - AO ROOT AREA: 11.9 CM^2
BH CV ECHO MEAS - AO ROOT DIAM: 3.9 CM
BH CV ECHO MEAS - AO V2 MAX: 142 CM/SEC
BH CV ECHO MEAS - AO V2 MEAN: 78.7 CM/SEC
BH CV ECHO MEAS - AO V2 VTI: 19.6 CM
BH CV ECHO MEAS - AVA(I,A): 4.1 CM^2
BH CV ECHO MEAS - AVA(I,D): 4.1 CM^2
BH CV ECHO MEAS - AVA(V,A): 3.5 CM^2
BH CV ECHO MEAS - AVA(V,D): 3.5 CM^2
BH CV ECHO MEAS - BSA(HAYCOCK): 2.1 M^2
BH CV ECHO MEAS - BSA: 2 M^2
BH CV ECHO MEAS - BZI_BMI: 29.2 KILOGRAMS/M^2
BH CV ECHO MEAS - BZI_METRIC_HEIGHT: 172.7 CM
BH CV ECHO MEAS - BZI_METRIC_WEIGHT: 87.1 KG
BH CV ECHO MEAS - EDV(CUBED): 151.4 ML
BH CV ECHO MEAS - EDV(MOD-SP4): 124 ML
BH CV ECHO MEAS - EDV(TEICH): 137.1 ML
BH CV ECHO MEAS - EF(CUBED): 75.8 %
BH CV ECHO MEAS - EF(MOD-SP4): 66.9 %
BH CV ECHO MEAS - EF(TEICH): 67.3 %
BH CV ECHO MEAS - ESV(CUBED): 36.6 ML
BH CV ECHO MEAS - ESV(MOD-SP4): 41.1 ML
BH CV ECHO MEAS - ESV(TEICH): 44.8 ML
BH CV ECHO MEAS - FS: 37.7 %
BH CV ECHO MEAS - IVS/LVPW: 1
BH CV ECHO MEAS - IVSD: 1.3 CM
BH CV ECHO MEAS - LA 3D VOL INDEX: 31
BH CV ECHO MEAS - LA DIMENSION: 3.9 CM
BH CV ECHO MEAS - LA/AO: 1
BH CV ECHO MEAS - LV DIASTOLIC VOL/BSA (35-75): 61.7 ML/M^2
BH CV ECHO MEAS - LV MASS(C)D: 269.4 GRAMS
BH CV ECHO MEAS - LV MASS(C)DI: 134.1 GRAMS/M^2
BH CV ECHO MEAS - LV MAX PG: 4.8 MMHG
BH CV ECHO MEAS - LV MEAN PG: 2 MMHG
BH CV ECHO MEAS - LV SYSTOLIC VOL/BSA (12-30): 20.5 ML/M^2
BH CV ECHO MEAS - LV V1 MAX: 109 CM/SEC
BH CV ECHO MEAS - LV V1 MEAN: 66.4 CM/SEC
BH CV ECHO MEAS - LV V1 VTI: 17.9 CM
BH CV ECHO MEAS - LVIDD: 5.3 CM
BH CV ECHO MEAS - LVIDS: 3.3 CM
BH CV ECHO MEAS - LVLD AP4: 8.7 CM
BH CV ECHO MEAS - LVLS AP4: 6 CM
BH CV ECHO MEAS - LVOT AREA (M): 4.5 CM^2
BH CV ECHO MEAS - LVOT AREA: 4.5 CM^2
BH CV ECHO MEAS - LVOT DIAM: 2.4 CM
BH CV ECHO MEAS - LVPWD: 1.2 CM
BH CV ECHO MEAS - MV A MAX VEL: 93.1 CM/SEC
BH CV ECHO MEAS - MV DEC SLOPE: 265 CM/SEC^2
BH CV ECHO MEAS - MV DEC TIME: 0.3 SEC
BH CV ECHO MEAS - MV E MAX VEL: 67.9 CM/SEC
BH CV ECHO MEAS - MV E/A: 0.73
BH CV ECHO MEAS - MV P1/2T MAX VEL: 74.7 CM/SEC
BH CV ECHO MEAS - MV P1/2T: 82.6 MSEC
BH CV ECHO MEAS - MVA P1/2T LCG: 2.9 CM^2
BH CV ECHO MEAS - MVA(P1/2T): 2.7 CM^2
BH CV ECHO MEAS - RVDD: 3.6 CM
BH CV ECHO MEAS - SI(AO): 116.6 ML/M^2
BH CV ECHO MEAS - SI(CUBED): 57.2 ML/M^2
BH CV ECHO MEAS - SI(LVOT): 40.3 ML/M^2
BH CV ECHO MEAS - SI(MOD-SP4): 41.3 ML/M^2
BH CV ECHO MEAS - SI(TEICH): 46 ML/M^2
BH CV ECHO MEAS - SV(AO): 234.1 ML
BH CV ECHO MEAS - SV(CUBED): 114.8 ML
BH CV ECHO MEAS - SV(LVOT): 81 ML
BH CV ECHO MEAS - SV(MOD-SP4): 82.9 ML
BH CV ECHO MEAS - SV(TEICH): 92.3 ML
BILIRUB SERPL-MCNC: 0.3 MG/DL (ref 0–1.2)
BILIRUB SERPL-MCNC: 0.4 MG/DL (ref 0–1.2)
BILIRUB SERPL-MCNC: 0.5 MG/DL (ref 0–1.2)
BILIRUB SERPL-MCNC: 0.6 MG/DL (ref 0–1.2)
BILIRUB SERPL-MCNC: 0.7 MG/DL (ref 0–1.2)
BILIRUB SERPL-MCNC: 1.7 MG/DL (ref 0–1.2)
BILIRUB SERPL-MCNC: 2.7 MG/DL (ref 0–1.2)
BILIRUB UR QL STRIP: NEGATIVE
BODY TEMPERATURE: 37 C
BUN SERPL-MCNC: 101 MG/DL (ref 8–23)
BUN SERPL-MCNC: 105 MG/DL (ref 8–23)
BUN SERPL-MCNC: 108 MG/DL (ref 8–23)
BUN SERPL-MCNC: 109 MG/DL (ref 8–23)
BUN SERPL-MCNC: 109 MG/DL (ref 8–23)
BUN SERPL-MCNC: 110 MG/DL (ref 8–23)
BUN SERPL-MCNC: 111 MG/DL (ref 8–23)
BUN SERPL-MCNC: 112 MG/DL (ref 8–23)
BUN SERPL-MCNC: 122 MG/DL (ref 8–23)
BUN SERPL-MCNC: 127 MG/DL (ref 8–23)
BUN SERPL-MCNC: 24 MG/DL (ref 8–23)
BUN SERPL-MCNC: 31 MG/DL (ref 8–23)
BUN SERPL-MCNC: 33 MG/DL (ref 8–23)
BUN SERPL-MCNC: 33 MG/DL (ref 8–23)
BUN SERPL-MCNC: 36 MG/DL (ref 8–23)
BUN SERPL-MCNC: 37 MG/DL (ref 8–23)
BUN SERPL-MCNC: 44 MG/DL (ref 8–23)
BUN SERPL-MCNC: 55 MG/DL (ref 8–23)
BUN SERPL-MCNC: 62 MG/DL (ref 8–23)
BUN SERPL-MCNC: 64 MG/DL (ref 8–23)
BUN SERPL-MCNC: 69 MG/DL (ref 8–23)
BUN SERPL-MCNC: 80 MG/DL (ref 8–23)
BUN SERPL-MCNC: 83 MG/DL (ref 8–23)
BUN SERPL-MCNC: 86 MG/DL (ref 8–23)
BUN SERPL-MCNC: 87 MG/DL (ref 8–23)
BUN SERPL-MCNC: 87 MG/DL (ref 8–23)
BUN SERPL-MCNC: 89 MG/DL (ref 8–23)
BUN SERPL-MCNC: 90 MG/DL (ref 8–23)
BUN SERPL-MCNC: 93 MG/DL (ref 8–23)
BUN SERPL-MCNC: 97 MG/DL (ref 8–23)
BUN SERPL-MCNC: 98 MG/DL (ref 8–23)
BUN SERPL-MCNC: 99 MG/DL (ref 8–23)
BUN/CREAT SERPL: 15.2 (ref 7–25)
BUN/CREAT SERPL: 31.1 (ref 7–25)
BUN/CREAT SERPL: 32 (ref 7–25)
BUN/CREAT SERPL: 32.7 (ref 7–25)
BUN/CREAT SERPL: 33.2 (ref 7–25)
BUN/CREAT SERPL: 34.6 (ref 7–25)
BUN/CREAT SERPL: 35 (ref 7–25)
BUN/CREAT SERPL: 37.4 (ref 7–25)
BUN/CREAT SERPL: 39.2 (ref 7–25)
BUN/CREAT SERPL: 39.6 (ref 7–25)
BUN/CREAT SERPL: 40.5 (ref 7–25)
BUN/CREAT SERPL: 42.6 (ref 7–25)
BUN/CREAT SERPL: 46.2 (ref 7–25)
BUN/CREAT SERPL: 48.4 (ref 7–25)
BUN/CREAT SERPL: 48.4 (ref 7–25)
BUN/CREAT SERPL: 49.6 (ref 7–25)
BUN/CREAT SERPL: 51.5 (ref 7–25)
BUN/CREAT SERPL: 51.9 (ref 7–25)
BUN/CREAT SERPL: 61.5 (ref 7–25)
BUN/CREAT SERPL: 62.9 (ref 7–25)
BUN/CREAT SERPL: 68.5 (ref 7–25)
BUN/CREAT SERPL: 70.8 (ref 7–25)
BUN/CREAT SERPL: 73 (ref 7–25)
BUN/CREAT SERPL: 73.8 (ref 7–25)
BUN/CREAT SERPL: 74.4 (ref 7–25)
BUN/CREAT SERPL: 74.8 (ref 7–25)
BUN/CREAT SERPL: 76 (ref 7–25)
BUN/CREAT SERPL: 77 (ref 7–25)
BUN/CREAT SERPL: 79.4 (ref 7–25)
BUN/CREAT SERPL: 80.2 (ref 7–25)
BUN/CREAT SERPL: 83.7 (ref 7–25)
BUN/CREAT SERPL: 84.6 (ref 7–25)
BURR CELLS BLD QL SMEAR: ABNORMAL
BURR CELLS BLD QL SMEAR: ABNORMAL
C PNEUM DNA NPH QL NAA+NON-PROBE: NOT DETECTED
CALCIUM SPEC-SCNC: 6.5 MG/DL (ref 8.6–10.5)
CALCIUM SPEC-SCNC: 6.8 MG/DL (ref 8.6–10.5)
CALCIUM SPEC-SCNC: 7 MG/DL (ref 8.6–10.5)
CALCIUM SPEC-SCNC: 7 MG/DL (ref 8.6–10.5)
CALCIUM SPEC-SCNC: 7.1 MG/DL (ref 8.6–10.5)
CALCIUM SPEC-SCNC: 7.2 MG/DL (ref 8.6–10.5)
CALCIUM SPEC-SCNC: 7.3 MG/DL (ref 8.6–10.5)
CALCIUM SPEC-SCNC: 7.3 MG/DL (ref 8.6–10.5)
CALCIUM SPEC-SCNC: 7.4 MG/DL (ref 8.6–10.5)
CALCIUM SPEC-SCNC: 7.4 MG/DL (ref 8.6–10.5)
CALCIUM SPEC-SCNC: 7.5 MG/DL (ref 8.6–10.5)
CALCIUM SPEC-SCNC: 7.5 MG/DL (ref 8.6–10.5)
CALCIUM SPEC-SCNC: 7.6 MG/DL (ref 8.6–10.5)
CALCIUM SPEC-SCNC: 7.6 MG/DL (ref 8.6–10.5)
CALCIUM SPEC-SCNC: 7.7 MG/DL (ref 8.6–10.5)
CALCIUM SPEC-SCNC: 7.8 MG/DL (ref 8.6–10.5)
CALCIUM SPEC-SCNC: 7.9 MG/DL (ref 8.6–10.5)
CALCIUM SPEC-SCNC: 7.9 MG/DL (ref 8.6–10.5)
CALCIUM SPEC-SCNC: 8 MG/DL (ref 8.6–10.5)
CALCIUM SPEC-SCNC: 8.1 MG/DL (ref 8.6–10.5)
CALCIUM SPEC-SCNC: 8.3 MG/DL (ref 8.6–10.5)
CALCIUM SPEC-SCNC: 8.4 MG/DL (ref 8.6–10.5)
CALCIUM SPEC-SCNC: 8.4 MG/DL (ref 8.6–10.5)
CALCIUM SPEC-SCNC: 8.6 MG/DL (ref 8.6–10.5)
CALCIUM SPEC-SCNC: 8.8 MG/DL (ref 8.6–10.5)
CALCIUM SPEC-SCNC: 9.1 MG/DL (ref 8.6–10.5)
CHLORIDE SERPL-SCNC: 100 MMOL/L (ref 98–107)
CHLORIDE SERPL-SCNC: 101 MMOL/L (ref 98–107)
CHLORIDE SERPL-SCNC: 102 MMOL/L (ref 98–107)
CHLORIDE SERPL-SCNC: 104 MMOL/L (ref 98–107)
CHLORIDE SERPL-SCNC: 105 MMOL/L (ref 98–107)
CHLORIDE SERPL-SCNC: 105 MMOL/L (ref 98–107)
CHLORIDE SERPL-SCNC: 106 MMOL/L (ref 98–107)
CHLORIDE SERPL-SCNC: 107 MMOL/L (ref 98–107)
CHLORIDE SERPL-SCNC: 107 MMOL/L (ref 98–107)
CHLORIDE SERPL-SCNC: 91 MMOL/L (ref 98–107)
CHLORIDE SERPL-SCNC: 91 MMOL/L (ref 98–107)
CHLORIDE SERPL-SCNC: 92 MMOL/L (ref 98–107)
CHLORIDE SERPL-SCNC: 93 MMOL/L (ref 98–107)
CHLORIDE SERPL-SCNC: 94 MMOL/L (ref 98–107)
CHLORIDE SERPL-SCNC: 95 MMOL/L (ref 98–107)
CHLORIDE SERPL-SCNC: 95 MMOL/L (ref 98–107)
CHLORIDE SERPL-SCNC: 96 MMOL/L (ref 98–107)
CHLORIDE SERPL-SCNC: 96 MMOL/L (ref 98–107)
CHLORIDE SERPL-SCNC: 97 MMOL/L (ref 98–107)
CHLORIDE SERPL-SCNC: 98 MMOL/L (ref 98–107)
CHLORIDE SERPL-SCNC: 99 MMOL/L (ref 98–107)
CHOLEST SERPL-MCNC: 180 MG/DL (ref 0–200)
CLARITY UR: ABNORMAL
CLUMPED PLATELETS: PRESENT
CO2 SERPL-SCNC: 14 MMOL/L (ref 22–29)
CO2 SERPL-SCNC: 16 MMOL/L (ref 22–29)
CO2 SERPL-SCNC: 17 MMOL/L (ref 22–29)
CO2 SERPL-SCNC: 18 MMOL/L (ref 22–29)
CO2 SERPL-SCNC: 21 MMOL/L (ref 22–29)
CO2 SERPL-SCNC: 22 MMOL/L (ref 22–29)
CO2 SERPL-SCNC: 23 MMOL/L (ref 22–29)
CO2 SERPL-SCNC: 24 MMOL/L (ref 22–29)
CO2 SERPL-SCNC: 25 MMOL/L (ref 22–29)
CO2 SERPL-SCNC: 25 MMOL/L (ref 22–29)
CO2 SERPL-SCNC: 26 MMOL/L (ref 22–29)
CO2 SERPL-SCNC: 27 MMOL/L (ref 22–29)
CO2 SERPL-SCNC: 28 MMOL/L (ref 22–29)
CO2 SERPL-SCNC: 28 MMOL/L (ref 22–29)
CO2 SERPL-SCNC: 29 MMOL/L (ref 22–29)
CO2 SERPL-SCNC: 30 MMOL/L (ref 22–29)
CO2 SERPL-SCNC: 31 MMOL/L (ref 22–29)
CO2 SERPL-SCNC: 32 MMOL/L (ref 22–29)
CO2 SERPL-SCNC: 33 MMOL/L (ref 22–29)
COHGB MFR BLD: 1.1 % (ref 0–5)
COHGB MFR BLD: 3.2 % (ref 0–5)
COLOR UR: YELLOW
CREAT SERPL-MCNC: 0.97 MG/DL (ref 0.76–1.27)
CREAT SERPL-MCNC: 1.01 MG/DL (ref 0.76–1.27)
CREAT SERPL-MCNC: 1.03 MG/DL (ref 0.76–1.27)
CREAT SERPL-MCNC: 1.04 MG/DL (ref 0.76–1.27)
CREAT SERPL-MCNC: 1.06 MG/DL (ref 0.76–1.27)
CREAT SERPL-MCNC: 1.07 MG/DL (ref 0.76–1.27)
CREAT SERPL-MCNC: 1.11 MG/DL (ref 0.76–1.27)
CREAT SERPL-MCNC: 1.13 MG/DL (ref 0.76–1.27)
CREAT SERPL-MCNC: 1.15 MG/DL (ref 0.76–1.27)
CREAT SERPL-MCNC: 1.17 MG/DL (ref 0.76–1.27)
CREAT SERPL-MCNC: 1.19 MG/DL (ref 0.76–1.27)
CREAT SERPL-MCNC: 1.21 MG/DL (ref 0.76–1.27)
CREAT SERPL-MCNC: 1.21 MG/DL (ref 0.76–1.27)
CREAT SERPL-MCNC: 1.22 MG/DL (ref 0.76–1.27)
CREAT SERPL-MCNC: 1.26 MG/DL (ref 0.76–1.27)
CREAT SERPL-MCNC: 1.28 MG/DL (ref 0.76–1.27)
CREAT SERPL-MCNC: 1.29 MG/DL (ref 0.76–1.27)
CREAT SERPL-MCNC: 1.29 MG/DL (ref 0.76–1.27)
CREAT SERPL-MCNC: 1.36 MG/DL (ref 0.76–1.27)
CREAT SERPL-MCNC: 1.54 MG/DL (ref 0.76–1.27)
CREAT SERPL-MCNC: 1.58 MG/DL (ref 0.76–1.27)
CREAT SERPL-MCNC: 1.6 MG/DL (ref 0.76–1.27)
CREAT SERPL-MCNC: 1.62 MG/DL (ref 0.76–1.27)
CREAT SERPL-MCNC: 1.75 MG/DL (ref 0.76–1.27)
CREAT SERPL-MCNC: 1.76 MG/DL (ref 0.76–1.27)
CREAT SERPL-MCNC: 1.82 MG/DL (ref 0.76–1.27)
CREAT SERPL-MCNC: 2.04 MG/DL (ref 0.76–1.27)
CREAT SERPL-MCNC: 2.25 MG/DL (ref 0.76–1.27)
CREAT SERPL-MCNC: 2.37 MG/DL (ref 0.76–1.27)
CREAT SERPL-MCNC: 2.41 MG/DL (ref 0.76–1.27)
CREAT SERPL-MCNC: 3.01 MG/DL (ref 0.76–1.27)
CREAT SERPL-MCNC: 3.4 MG/DL (ref 0.76–1.27)
CRP SERPL-MCNC: 0.68 MG/DL (ref 0–0.5)
CRP SERPL-MCNC: 1.08 MG/DL (ref 0–0.5)
CRP SERPL-MCNC: 1.47 MG/DL (ref 0–0.5)
CRP SERPL-MCNC: 1.47 MG/DL (ref 0–0.5)
CRP SERPL-MCNC: 1.5 MG/DL (ref 0–0.5)
CRP SERPL-MCNC: 1.62 MG/DL (ref 0–0.5)
CRP SERPL-MCNC: 1.65 MG/DL (ref 0–0.5)
CRP SERPL-MCNC: 17.65 MG/DL (ref 0–0.5)
CRP SERPL-MCNC: 2.02 MG/DL (ref 0–0.5)
CRP SERPL-MCNC: 2.04 MG/DL (ref 0–0.5)
CRP SERPL-MCNC: 2.05 MG/DL (ref 0–0.5)
CRP SERPL-MCNC: 2.12 MG/DL (ref 0–0.5)
CRP SERPL-MCNC: 2.4 MG/DL (ref 0–0.5)
CRP SERPL-MCNC: 2.94 MG/DL (ref 0–0.5)
CRP SERPL-MCNC: 38.42 MG/DL (ref 0–0.5)
CRP SERPL-MCNC: 4.96 MG/DL (ref 0–0.5)
CRP SERPL-MCNC: 5.53 MG/DL (ref 0–0.5)
CRP SERPL-MCNC: 6.68 MG/DL (ref 0–0.5)
CRP SERPL-MCNC: 7.18 MG/DL (ref 0–0.5)
CRP SERPL-MCNC: 8.22 MG/DL (ref 0–0.5)
D DIMER PPP FEU-MCNC: 2.27 MG/L (FEU) (ref 0–0.5)
D DIMER PPP FEU-MCNC: 2.86 MG/L (FEU) (ref 0–0.5)
D DIMER PPP FEU-MCNC: >20 MG/L (FEU) (ref 0–0.5)
D-LACTATE SERPL-SCNC: 2 MMOL/L (ref 0.5–2)
D-LACTATE SERPL-SCNC: 4.3 MMOL/L (ref 0.5–2)
DEPRECATED RDW RBC AUTO: 38.1 FL (ref 37–54)
DEPRECATED RDW RBC AUTO: 39 FL (ref 37–54)
DEPRECATED RDW RBC AUTO: 39.1 FL (ref 37–54)
DEPRECATED RDW RBC AUTO: 39.3 FL (ref 37–54)
DEPRECATED RDW RBC AUTO: 39.6 FL (ref 37–54)
DEPRECATED RDW RBC AUTO: 39.6 FL (ref 37–54)
DEPRECATED RDW RBC AUTO: 39.7 FL (ref 37–54)
DEPRECATED RDW RBC AUTO: 39.7 FL (ref 37–54)
DEPRECATED RDW RBC AUTO: 39.8 FL (ref 37–54)
DEPRECATED RDW RBC AUTO: 40.6 FL (ref 37–54)
DEPRECATED RDW RBC AUTO: 42.9 FL (ref 37–54)
DEPRECATED RDW RBC AUTO: 43.8 FL (ref 37–54)
DEPRECATED RDW RBC AUTO: 45.1 FL (ref 37–54)
DEPRECATED RDW RBC AUTO: 46.8 FL (ref 37–54)
DEPRECATED RDW RBC AUTO: 46.9 FL (ref 37–54)
DEPRECATED RDW RBC AUTO: 47.6 FL (ref 37–54)
DEPRECATED RDW RBC AUTO: 48 FL (ref 37–54)
DEPRECATED RDW RBC AUTO: 49 FL (ref 37–54)
DEPRECATED RDW RBC AUTO: 49.1 FL (ref 37–54)
DEPRECATED RDW RBC AUTO: 49.9 FL (ref 37–54)
EOSINOPHIL # BLD AUTO: 0 10*3/MM3 (ref 0–0.4)
EOSINOPHIL # BLD AUTO: 0.01 10*3/MM3 (ref 0–0.4)
EOSINOPHIL # BLD AUTO: 0.02 10*3/MM3 (ref 0–0.4)
EOSINOPHIL # BLD AUTO: 0.03 10*3/MM3 (ref 0–0.4)
EOSINOPHIL # BLD AUTO: 0.12 10*3/MM3 (ref 0–0.4)
EOSINOPHIL # BLD AUTO: 0.15 10*3/MM3 (ref 0–0.4)
EOSINOPHIL # BLD AUTO: 0.24 10*3/MM3 (ref 0–0.4)
EOSINOPHIL # BLD MANUAL: 0.17 10*3/MM3 (ref 0–0.4)
EOSINOPHIL # BLD MANUAL: 0.27 10*3/MM3 (ref 0–0.4)
EOSINOPHIL # BLD MANUAL: 0.39 10*3/MM3 (ref 0–0.4)
EOSINOPHIL NFR BLD AUTO: 0 % (ref 0.3–6.2)
EOSINOPHIL NFR BLD AUTO: 0.1 % (ref 0.3–6.2)
EOSINOPHIL NFR BLD AUTO: 0.2 % (ref 0.3–6.2)
EOSINOPHIL NFR BLD AUTO: 0.7 % (ref 0.3–6.2)
EOSINOPHIL NFR BLD AUTO: 0.8 % (ref 0.3–6.2)
EOSINOPHIL NFR BLD AUTO: 1.4 % (ref 0.3–6.2)
EOSINOPHIL NFR BLD MANUAL: 1 % (ref 0.3–6.2)
EOSINOPHIL NFR BLD MANUAL: 2 % (ref 0.3–6.2)
EOSINOPHIL NFR BLD MANUAL: 2.1 % (ref 0.3–6.2)
EPAP: 10
ERYTHROCYTE [DISTWIDTH] IN BLOOD BY AUTOMATED COUNT: 12.1 % (ref 12.3–15.4)
ERYTHROCYTE [DISTWIDTH] IN BLOOD BY AUTOMATED COUNT: 12.1 % (ref 12.3–15.4)
ERYTHROCYTE [DISTWIDTH] IN BLOOD BY AUTOMATED COUNT: 12.2 % (ref 12.3–15.4)
ERYTHROCYTE [DISTWIDTH] IN BLOOD BY AUTOMATED COUNT: 12.3 % (ref 12.3–15.4)
ERYTHROCYTE [DISTWIDTH] IN BLOOD BY AUTOMATED COUNT: 12.4 % (ref 12.3–15.4)
ERYTHROCYTE [DISTWIDTH] IN BLOOD BY AUTOMATED COUNT: 12.5 % (ref 12.3–15.4)
ERYTHROCYTE [DISTWIDTH] IN BLOOD BY AUTOMATED COUNT: 13 % (ref 12.3–15.4)
ERYTHROCYTE [DISTWIDTH] IN BLOOD BY AUTOMATED COUNT: 13.1 % (ref 12.3–15.4)
ERYTHROCYTE [DISTWIDTH] IN BLOOD BY AUTOMATED COUNT: 13.2 % (ref 12.3–15.4)
ERYTHROCYTE [DISTWIDTH] IN BLOOD BY AUTOMATED COUNT: 13.2 % (ref 12.3–15.4)
ERYTHROCYTE [DISTWIDTH] IN BLOOD BY AUTOMATED COUNT: 13.3 % (ref 12.3–15.4)
ERYTHROCYTE [DISTWIDTH] IN BLOOD BY AUTOMATED COUNT: 13.4 % (ref 12.3–15.4)
ERYTHROCYTE [DISTWIDTH] IN BLOOD BY AUTOMATED COUNT: 13.4 % (ref 12.3–15.4)
ERYTHROCYTE [DISTWIDTH] IN BLOOD BY AUTOMATED COUNT: 13.6 % (ref 12.3–15.4)
ERYTHROCYTE [DISTWIDTH] IN BLOOD BY AUTOMATED COUNT: 14.2 % (ref 12.3–15.4)
FERRITIN SERPL-MCNC: 1032 NG/ML (ref 30–400)
FERRITIN SERPL-MCNC: 1088 NG/ML (ref 30–400)
FERRITIN SERPL-MCNC: 1115 NG/ML (ref 30–400)
FERRITIN SERPL-MCNC: 1315 NG/ML (ref 30–400)
FERRITIN SERPL-MCNC: 1607 NG/ML (ref 30–400)
FERRITIN SERPL-MCNC: 1729 NG/ML (ref 30–400)
FLUAV SUBTYP SPEC NAA+PROBE: NOT DETECTED
FLUBV RNA ISLT QL NAA+PROBE: NOT DETECTED
GAS FLOW AIRWAY: 15 LPM
GFR SERPL CREATININE-BSD FRML MDRD: 18 ML/MIN/1.73
GFR SERPL CREATININE-BSD FRML MDRD: 21 ML/MIN/1.73
GFR SERPL CREATININE-BSD FRML MDRD: 27 ML/MIN/1.73
GFR SERPL CREATININE-BSD FRML MDRD: 27 ML/MIN/1.73
GFR SERPL CREATININE-BSD FRML MDRD: 29 ML/MIN/1.73
GFR SERPL CREATININE-BSD FRML MDRD: 33 ML/MIN/1.73
GFR SERPL CREATININE-BSD FRML MDRD: 37 ML/MIN/1.73
GFR SERPL CREATININE-BSD FRML MDRD: 39 ML/MIN/1.73
GFR SERPL CREATININE-BSD FRML MDRD: 39 ML/MIN/1.73
GFR SERPL CREATININE-BSD FRML MDRD: 42 ML/MIN/1.73
GFR SERPL CREATININE-BSD FRML MDRD: 43 ML/MIN/1.73
GFR SERPL CREATININE-BSD FRML MDRD: 44 ML/MIN/1.73
GFR SERPL CREATININE-BSD FRML MDRD: 45 ML/MIN/1.73
GFR SERPL CREATININE-BSD FRML MDRD: 52 ML/MIN/1.73
GFR SERPL CREATININE-BSD FRML MDRD: 55 ML/MIN/1.73
GFR SERPL CREATININE-BSD FRML MDRD: 55 ML/MIN/1.73
GFR SERPL CREATININE-BSD FRML MDRD: 56 ML/MIN/1.73
GFR SERPL CREATININE-BSD FRML MDRD: 57 ML/MIN/1.73
GFR SERPL CREATININE-BSD FRML MDRD: 59 ML/MIN/1.73
GFR SERPL CREATININE-BSD FRML MDRD: 61 ML/MIN/1.73
GFR SERPL CREATININE-BSD FRML MDRD: 62 ML/MIN/1.73
GFR SERPL CREATININE-BSD FRML MDRD: 63 ML/MIN/1.73
GFR SERPL CREATININE-BSD FRML MDRD: 64 ML/MIN/1.73
GFR SERPL CREATININE-BSD FRML MDRD: 66 ML/MIN/1.73
GFR SERPL CREATININE-BSD FRML MDRD: 69 ML/MIN/1.73
GFR SERPL CREATININE-BSD FRML MDRD: 69 ML/MIN/1.73
GFR SERPL CREATININE-BSD FRML MDRD: 71 ML/MIN/1.73
GFR SERPL CREATININE-BSD FRML MDRD: 72 ML/MIN/1.73
GFR SERPL CREATININE-BSD FRML MDRD: 73 ML/MIN/1.73
GFR SERPL CREATININE-BSD FRML MDRD: 77 ML/MIN/1.73
GLOBULIN UR ELPH-MCNC: 2.1 GM/DL
GLOBULIN UR ELPH-MCNC: 2.4 GM/DL
GLOBULIN UR ELPH-MCNC: 2.4 GM/DL
GLOBULIN UR ELPH-MCNC: 2.5 GM/DL
GLOBULIN UR ELPH-MCNC: 2.6 GM/DL
GLOBULIN UR ELPH-MCNC: 2.7 GM/DL
GLOBULIN UR ELPH-MCNC: 2.8 GM/DL
GLOBULIN UR ELPH-MCNC: 2.9 GM/DL
GLOBULIN UR ELPH-MCNC: 2.9 GM/DL
GLOBULIN UR ELPH-MCNC: 3.2 GM/DL
GLOBULIN UR ELPH-MCNC: 3.2 GM/DL
GLOBULIN UR ELPH-MCNC: 3.4 GM/DL
GLOBULIN UR ELPH-MCNC: 3.8 GM/DL
GLUCOSE BLDC GLUCOMTR-MCNC: 107 MG/DL (ref 70–130)
GLUCOSE BLDC GLUCOMTR-MCNC: 109 MG/DL (ref 70–130)
GLUCOSE BLDC GLUCOMTR-MCNC: 117 MG/DL (ref 70–130)
GLUCOSE BLDC GLUCOMTR-MCNC: 122 MG/DL (ref 70–130)
GLUCOSE BLDC GLUCOMTR-MCNC: 123 MG/DL (ref 70–130)
GLUCOSE BLDC GLUCOMTR-MCNC: 123 MG/DL (ref 70–130)
GLUCOSE BLDC GLUCOMTR-MCNC: 129 MG/DL (ref 70–130)
GLUCOSE BLDC GLUCOMTR-MCNC: 129 MG/DL (ref 70–130)
GLUCOSE BLDC GLUCOMTR-MCNC: 131 MG/DL (ref 70–130)
GLUCOSE BLDC GLUCOMTR-MCNC: 133 MG/DL (ref 70–130)
GLUCOSE BLDC GLUCOMTR-MCNC: 133 MG/DL (ref 70–130)
GLUCOSE BLDC GLUCOMTR-MCNC: 134 MG/DL (ref 70–130)
GLUCOSE BLDC GLUCOMTR-MCNC: 134 MG/DL (ref 70–130)
GLUCOSE BLDC GLUCOMTR-MCNC: 135 MG/DL (ref 70–130)
GLUCOSE BLDC GLUCOMTR-MCNC: 138 MG/DL (ref 70–130)
GLUCOSE BLDC GLUCOMTR-MCNC: 138 MG/DL (ref 70–130)
GLUCOSE BLDC GLUCOMTR-MCNC: 142 MG/DL (ref 70–130)
GLUCOSE BLDC GLUCOMTR-MCNC: 142 MG/DL (ref 70–130)
GLUCOSE BLDC GLUCOMTR-MCNC: 145 MG/DL (ref 70–130)
GLUCOSE BLDC GLUCOMTR-MCNC: 146 MG/DL (ref 70–130)
GLUCOSE BLDC GLUCOMTR-MCNC: 149 MG/DL (ref 70–130)
GLUCOSE BLDC GLUCOMTR-MCNC: 149 MG/DL (ref 70–130)
GLUCOSE BLDC GLUCOMTR-MCNC: 152 MG/DL (ref 70–130)
GLUCOSE BLDC GLUCOMTR-MCNC: 153 MG/DL (ref 70–130)
GLUCOSE BLDC GLUCOMTR-MCNC: 155 MG/DL (ref 70–130)
GLUCOSE BLDC GLUCOMTR-MCNC: 158 MG/DL (ref 70–130)
GLUCOSE BLDC GLUCOMTR-MCNC: 159 MG/DL (ref 70–130)
GLUCOSE BLDC GLUCOMTR-MCNC: 161 MG/DL (ref 70–130)
GLUCOSE BLDC GLUCOMTR-MCNC: 162 MG/DL (ref 70–130)
GLUCOSE BLDC GLUCOMTR-MCNC: 164 MG/DL (ref 70–130)
GLUCOSE BLDC GLUCOMTR-MCNC: 165 MG/DL (ref 70–130)
GLUCOSE BLDC GLUCOMTR-MCNC: 166 MG/DL (ref 70–130)
GLUCOSE BLDC GLUCOMTR-MCNC: 169 MG/DL (ref 70–130)
GLUCOSE BLDC GLUCOMTR-MCNC: 170 MG/DL (ref 70–130)
GLUCOSE BLDC GLUCOMTR-MCNC: 170 MG/DL (ref 70–130)
GLUCOSE BLDC GLUCOMTR-MCNC: 171 MG/DL (ref 70–130)
GLUCOSE BLDC GLUCOMTR-MCNC: 172 MG/DL (ref 70–130)
GLUCOSE BLDC GLUCOMTR-MCNC: 173 MG/DL (ref 70–130)
GLUCOSE BLDC GLUCOMTR-MCNC: 175 MG/DL (ref 70–130)
GLUCOSE BLDC GLUCOMTR-MCNC: 175 MG/DL (ref 70–130)
GLUCOSE BLDC GLUCOMTR-MCNC: 176 MG/DL (ref 70–130)
GLUCOSE BLDC GLUCOMTR-MCNC: 176 MG/DL (ref 70–130)
GLUCOSE BLDC GLUCOMTR-MCNC: 178 MG/DL (ref 70–130)
GLUCOSE BLDC GLUCOMTR-MCNC: 179 MG/DL (ref 70–130)
GLUCOSE BLDC GLUCOMTR-MCNC: 180 MG/DL (ref 70–130)
GLUCOSE BLDC GLUCOMTR-MCNC: 182 MG/DL (ref 70–130)
GLUCOSE BLDC GLUCOMTR-MCNC: 182 MG/DL (ref 70–130)
GLUCOSE BLDC GLUCOMTR-MCNC: 183 MG/DL (ref 70–130)
GLUCOSE BLDC GLUCOMTR-MCNC: 183 MG/DL (ref 70–130)
GLUCOSE BLDC GLUCOMTR-MCNC: 186 MG/DL (ref 70–130)
GLUCOSE BLDC GLUCOMTR-MCNC: 187 MG/DL (ref 70–130)
GLUCOSE BLDC GLUCOMTR-MCNC: 187 MG/DL (ref 70–130)
GLUCOSE BLDC GLUCOMTR-MCNC: 189 MG/DL (ref 70–130)
GLUCOSE BLDC GLUCOMTR-MCNC: 189 MG/DL (ref 70–130)
GLUCOSE BLDC GLUCOMTR-MCNC: 192 MG/DL (ref 70–130)
GLUCOSE BLDC GLUCOMTR-MCNC: 192 MG/DL (ref 70–130)
GLUCOSE BLDC GLUCOMTR-MCNC: 193 MG/DL (ref 70–130)
GLUCOSE BLDC GLUCOMTR-MCNC: 194 MG/DL (ref 70–130)
GLUCOSE BLDC GLUCOMTR-MCNC: 194 MG/DL (ref 70–130)
GLUCOSE BLDC GLUCOMTR-MCNC: 198 MG/DL (ref 70–130)
GLUCOSE BLDC GLUCOMTR-MCNC: 198 MG/DL (ref 70–130)
GLUCOSE BLDC GLUCOMTR-MCNC: 199 MG/DL (ref 70–130)
GLUCOSE BLDC GLUCOMTR-MCNC: 202 MG/DL (ref 70–130)
GLUCOSE BLDC GLUCOMTR-MCNC: 209 MG/DL (ref 70–130)
GLUCOSE BLDC GLUCOMTR-MCNC: 211 MG/DL (ref 70–130)
GLUCOSE BLDC GLUCOMTR-MCNC: 213 MG/DL (ref 70–130)
GLUCOSE BLDC GLUCOMTR-MCNC: 213 MG/DL (ref 70–130)
GLUCOSE BLDC GLUCOMTR-MCNC: 214 MG/DL (ref 70–130)
GLUCOSE BLDC GLUCOMTR-MCNC: 220 MG/DL (ref 70–130)
GLUCOSE BLDC GLUCOMTR-MCNC: 224 MG/DL (ref 70–130)
GLUCOSE BLDC GLUCOMTR-MCNC: 228 MG/DL (ref 70–130)
GLUCOSE BLDC GLUCOMTR-MCNC: 229 MG/DL (ref 70–130)
GLUCOSE BLDC GLUCOMTR-MCNC: 230 MG/DL (ref 70–130)
GLUCOSE BLDC GLUCOMTR-MCNC: 234 MG/DL (ref 70–130)
GLUCOSE BLDC GLUCOMTR-MCNC: 239 MG/DL (ref 70–130)
GLUCOSE BLDC GLUCOMTR-MCNC: 271 MG/DL (ref 70–130)
GLUCOSE BLDC GLUCOMTR-MCNC: 88 MG/DL (ref 70–130)
GLUCOSE BLDC GLUCOMTR-MCNC: 95 MG/DL (ref 70–130)
GLUCOSE BLDC GLUCOMTR-MCNC: 96 MG/DL (ref 70–130)
GLUCOSE BLDC GLUCOMTR-MCNC: 97 MG/DL (ref 70–130)
GLUCOSE SERPL-MCNC: 110 MG/DL (ref 65–99)
GLUCOSE SERPL-MCNC: 111 MG/DL (ref 65–99)
GLUCOSE SERPL-MCNC: 114 MG/DL (ref 65–99)
GLUCOSE SERPL-MCNC: 118 MG/DL (ref 65–99)
GLUCOSE SERPL-MCNC: 131 MG/DL (ref 65–99)
GLUCOSE SERPL-MCNC: 135 MG/DL (ref 65–99)
GLUCOSE SERPL-MCNC: 141 MG/DL (ref 65–99)
GLUCOSE SERPL-MCNC: 142 MG/DL (ref 65–99)
GLUCOSE SERPL-MCNC: 142 MG/DL (ref 65–99)
GLUCOSE SERPL-MCNC: 143 MG/DL (ref 65–99)
GLUCOSE SERPL-MCNC: 151 MG/DL (ref 65–99)
GLUCOSE SERPL-MCNC: 166 MG/DL (ref 65–99)
GLUCOSE SERPL-MCNC: 166 MG/DL (ref 65–99)
GLUCOSE SERPL-MCNC: 167 MG/DL (ref 65–99)
GLUCOSE SERPL-MCNC: 169 MG/DL (ref 65–99)
GLUCOSE SERPL-MCNC: 173 MG/DL (ref 65–99)
GLUCOSE SERPL-MCNC: 176 MG/DL (ref 65–99)
GLUCOSE SERPL-MCNC: 177 MG/DL (ref 65–99)
GLUCOSE SERPL-MCNC: 179 MG/DL (ref 65–99)
GLUCOSE SERPL-MCNC: 183 MG/DL (ref 65–99)
GLUCOSE SERPL-MCNC: 186 MG/DL (ref 65–99)
GLUCOSE SERPL-MCNC: 189 MG/DL (ref 65–99)
GLUCOSE SERPL-MCNC: 190 MG/DL (ref 65–99)
GLUCOSE SERPL-MCNC: 190 MG/DL (ref 65–99)
GLUCOSE SERPL-MCNC: 194 MG/DL (ref 65–99)
GLUCOSE SERPL-MCNC: 203 MG/DL (ref 65–99)
GLUCOSE SERPL-MCNC: 208 MG/DL (ref 65–99)
GLUCOSE SERPL-MCNC: 211 MG/DL (ref 65–99)
GLUCOSE SERPL-MCNC: 218 MG/DL (ref 65–99)
GLUCOSE SERPL-MCNC: 220 MG/DL (ref 65–99)
GLUCOSE SERPL-MCNC: 226 MG/DL (ref 65–99)
GLUCOSE SERPL-MCNC: 242 MG/DL (ref 65–99)
GLUCOSE UR STRIP-MCNC: NEGATIVE MG/DL
GRAM STN SPEC: ABNORMAL
HADV DNA SPEC NAA+PROBE: NOT DETECTED
HBA1C MFR BLD: 6.5 % (ref 4.8–5.6)
HBA1C MFR BLD: 6.7 % (ref 4.8–5.6)
HCO3 BLDA-SCNC: 19.8 MMOL/L (ref 20–26)
HCO3 BLDA-SCNC: 21.6 MMOL/L (ref 20–26)
HCO3 BLDA-SCNC: 24 MMOL/L (ref 20–26)
HCO3 BLDA-SCNC: 24.9 MMOL/L (ref 20–26)
HCO3 BLDA-SCNC: 25.6 MMOL/L (ref 20–26)
HCO3 BLDA-SCNC: 26.3 MMOL/L (ref 20–26)
HCO3 BLDA-SCNC: 26.7 MMOL/L (ref 20–26)
HCO3 BLDA-SCNC: 26.9 MMOL/L (ref 20–26)
HCO3 BLDA-SCNC: 26.9 MMOL/L (ref 20–26)
HCO3 BLDA-SCNC: 27.6 MMOL/L (ref 20–26)
HCO3 BLDA-SCNC: 27.8 MMOL/L (ref 20–26)
HCO3 BLDA-SCNC: 28.8 MMOL/L (ref 20–26)
HCO3 BLDA-SCNC: 29.7 MMOL/L (ref 20–26)
HCO3 BLDA-SCNC: 30.2 MMOL/L (ref 20–26)
HCO3 BLDA-SCNC: 31.5 MMOL/L (ref 20–26)
HCO3 BLDA-SCNC: 35.4 MMOL/L (ref 20–26)
HCOV 229E RNA SPEC QL NAA+PROBE: NOT DETECTED
HCOV HKU1 RNA SPEC QL NAA+PROBE: NOT DETECTED
HCOV NL63 RNA SPEC QL NAA+PROBE: NOT DETECTED
HCOV OC43 RNA SPEC QL NAA+PROBE: NOT DETECTED
HCT VFR BLD AUTO: 29.5 % (ref 37.5–51)
HCT VFR BLD AUTO: 30.6 % (ref 37.5–51)
HCT VFR BLD AUTO: 31 % (ref 37.5–51)
HCT VFR BLD AUTO: 33.1 % (ref 37.5–51)
HCT VFR BLD AUTO: 33.3 % (ref 37.5–51)
HCT VFR BLD AUTO: 33.6 % (ref 37.5–51)
HCT VFR BLD AUTO: 35.6 % (ref 37.5–51)
HCT VFR BLD AUTO: 36.4 % (ref 37.5–51)
HCT VFR BLD AUTO: 37.8 % (ref 37.5–51)
HCT VFR BLD AUTO: 38.3 % (ref 37.5–51)
HCT VFR BLD AUTO: 39.1 % (ref 37.5–51)
HCT VFR BLD AUTO: 40.1 % (ref 37.5–51)
HCT VFR BLD AUTO: 40.6 % (ref 37.5–51)
HCT VFR BLD AUTO: 41.8 % (ref 37.5–51)
HCT VFR BLD AUTO: 41.8 % (ref 37.5–51)
HCT VFR BLD AUTO: 45.1 % (ref 37.5–51)
HCT VFR BLD AUTO: 45.2 % (ref 37.5–51)
HCT VFR BLD AUTO: 46.3 % (ref 37.5–51)
HCT VFR BLD AUTO: 46.5 % (ref 37.5–51)
HCT VFR BLD AUTO: 46.5 % (ref 37.5–51)
HCT VFR BLD CALC: 23.2 % (ref 38–51)
HCT VFR BLD CALC: 48.6 % (ref 38–51)
HDLC SERPL-MCNC: 30 MG/DL (ref 40–60)
HGB BLD-MCNC: 10 G/DL (ref 13–17.7)
HGB BLD-MCNC: 10.1 G/DL (ref 13–17.7)
HGB BLD-MCNC: 10.8 G/DL (ref 13–17.7)
HGB BLD-MCNC: 11.3 G/DL (ref 13–17.7)
HGB BLD-MCNC: 12.1 G/DL (ref 13–17.7)
HGB BLD-MCNC: 12.4 G/DL (ref 13–17.7)
HGB BLD-MCNC: 12.8 G/DL (ref 13–17.7)
HGB BLD-MCNC: 13 G/DL (ref 13–17.7)
HGB BLD-MCNC: 13.5 G/DL (ref 13–17.7)
HGB BLD-MCNC: 14 G/DL (ref 13–17.7)
HGB BLD-MCNC: 14.1 G/DL (ref 13–17.7)
HGB BLD-MCNC: 14.7 G/DL (ref 13–17.7)
HGB BLD-MCNC: 14.7 G/DL (ref 13–17.7)
HGB BLD-MCNC: 14.8 G/DL (ref 13–17.7)
HGB BLD-MCNC: 15 G/DL (ref 13–17.7)
HGB BLD-MCNC: 15.1 G/DL (ref 13–17.7)
HGB BLD-MCNC: 9 G/DL (ref 13–17.7)
HGB BLD-MCNC: 9.2 G/DL (ref 13–17.7)
HGB BLD-MCNC: 9.5 G/DL (ref 13–17.7)
HGB BLD-MCNC: 9.9 G/DL (ref 13–17.7)
HGB BLDA-MCNC: 15.9 G/DL (ref 14–18)
HGB BLDA-MCNC: 7.6 G/DL (ref 14–18)
HGB UR QL STRIP.AUTO: NEGATIVE
HMPV RNA NPH QL NAA+NON-PROBE: NOT DETECTED
HOLD SPECIMEN: NORMAL
HPIV1 RNA SPEC QL NAA+PROBE: NOT DETECTED
HPIV2 RNA SPEC QL NAA+PROBE: NOT DETECTED
HPIV3 RNA NPH QL NAA+PROBE: NOT DETECTED
HPIV4 P GENE NPH QL NAA+PROBE: NOT DETECTED
IL6 SERPL-MCNC: 177.3 PG/ML (ref 0–13)
IMM GRANULOCYTES # BLD AUTO: 0.05 10*3/MM3 (ref 0–0.05)
IMM GRANULOCYTES # BLD AUTO: 0.06 10*3/MM3 (ref 0–0.05)
IMM GRANULOCYTES # BLD AUTO: 0.1 10*3/MM3 (ref 0–0.05)
IMM GRANULOCYTES # BLD AUTO: 0.23 10*3/MM3 (ref 0–0.05)
IMM GRANULOCYTES # BLD AUTO: 0.24 10*3/MM3 (ref 0–0.05)
IMM GRANULOCYTES # BLD AUTO: 0.34 10*3/MM3 (ref 0–0.05)
IMM GRANULOCYTES # BLD AUTO: 0.35 10*3/MM3 (ref 0–0.05)
IMM GRANULOCYTES # BLD AUTO: 0.37 10*3/MM3 (ref 0–0.05)
IMM GRANULOCYTES # BLD AUTO: 0.46 10*3/MM3 (ref 0–0.05)
IMM GRANULOCYTES # BLD AUTO: 0.54 10*3/MM3 (ref 0–0.05)
IMM GRANULOCYTES # BLD AUTO: 0.55 10*3/MM3 (ref 0–0.05)
IMM GRANULOCYTES # BLD AUTO: 0.66 10*3/MM3 (ref 0–0.05)
IMM GRANULOCYTES # BLD AUTO: 0.69 10*3/MM3 (ref 0–0.05)
IMM GRANULOCYTES NFR BLD AUTO: 0.6 % (ref 0–0.5)
IMM GRANULOCYTES NFR BLD AUTO: 0.7 % (ref 0–0.5)
IMM GRANULOCYTES NFR BLD AUTO: 0.9 % (ref 0–0.5)
IMM GRANULOCYTES NFR BLD AUTO: 1.1 % (ref 0–0.5)
IMM GRANULOCYTES NFR BLD AUTO: 1.9 % (ref 0–0.5)
IMM GRANULOCYTES NFR BLD AUTO: 2 % (ref 0–0.5)
IMM GRANULOCYTES NFR BLD AUTO: 2.1 % (ref 0–0.5)
IMM GRANULOCYTES NFR BLD AUTO: 2.1 % (ref 0–0.5)
IMM GRANULOCYTES NFR BLD AUTO: 2.5 % (ref 0–0.5)
IMM GRANULOCYTES NFR BLD AUTO: 2.6 % (ref 0–0.5)
IMM GRANULOCYTES NFR BLD AUTO: 2.8 % (ref 0–0.5)
IMM GRANULOCYTES NFR BLD AUTO: 2.9 % (ref 0–0.5)
IMM GRANULOCYTES NFR BLD AUTO: 4.3 % (ref 0–0.5)
INHALED O2 CONCENTRATION: 100 %
INHALED O2 CONCENTRATION: 85 %
INR PPP: 1.21 (ref 0.91–1.09)
INR PPP: 1.33 (ref 0.91–1.09)
INR PPP: 1.43 (ref 0.91–1.09)
IPAP: 18
KETONES UR QL STRIP: NEGATIVE
L PNEUMO1 AG UR QL IA: NEGATIVE
LDH SERPL-CCNC: 704 U/L (ref 135–225)
LDH SERPL-CCNC: 745 U/L (ref 135–225)
LDH SERPL-CCNC: 967 U/L (ref 135–225)
LDLC SERPL CALC-MCNC: 95 MG/DL (ref 0–100)
LDLC/HDLC SERPL: 2.84 {RATIO}
LEFT ATRIUM VOLUME: 62.4 CM3
LEUKOCYTE ESTERASE UR QL STRIP.AUTO: NEGATIVE
LYMPHOCYTES # BLD AUTO: 0.37 10*3/MM3 (ref 0.7–3.1)
LYMPHOCYTES # BLD AUTO: 0.39 10*3/MM3 (ref 0.7–3.1)
LYMPHOCYTES # BLD AUTO: 0.5 10*3/MM3 (ref 0.7–3.1)
LYMPHOCYTES # BLD AUTO: 0.51 10*3/MM3 (ref 0.7–3.1)
LYMPHOCYTES # BLD AUTO: 0.56 10*3/MM3 (ref 0.7–3.1)
LYMPHOCYTES # BLD AUTO: 0.61 10*3/MM3 (ref 0.7–3.1)
LYMPHOCYTES # BLD AUTO: 0.63 10*3/MM3 (ref 0.7–3.1)
LYMPHOCYTES # BLD AUTO: 0.68 10*3/MM3 (ref 0.7–3.1)
LYMPHOCYTES # BLD AUTO: 0.73 10*3/MM3 (ref 0.7–3.1)
LYMPHOCYTES # BLD AUTO: 0.85 10*3/MM3 (ref 0.7–3.1)
LYMPHOCYTES # BLD AUTO: 0.93 10*3/MM3 (ref 0.7–3.1)
LYMPHOCYTES # BLD AUTO: 0.96 10*3/MM3 (ref 0.7–3.1)
LYMPHOCYTES # BLD AUTO: 1.24 10*3/MM3 (ref 0.7–3.1)
LYMPHOCYTES # BLD MANUAL: 0 10*3/MM3 (ref 0.7–3.1)
LYMPHOCYTES # BLD MANUAL: 0.38 10*3/MM3 (ref 0.7–3.1)
LYMPHOCYTES # BLD MANUAL: 0.39 10*3/MM3 (ref 0.7–3.1)
LYMPHOCYTES # BLD MANUAL: 0.41 10*3/MM3 (ref 0.7–3.1)
LYMPHOCYTES # BLD MANUAL: 0.51 10*3/MM3 (ref 0.7–3.1)
LYMPHOCYTES # BLD MANUAL: 0.91 10*3/MM3 (ref 0.7–3.1)
LYMPHOCYTES # BLD MANUAL: 1.37 10*3/MM3 (ref 0.7–3.1)
LYMPHOCYTES NFR BLD AUTO: 1.4 % (ref 19.6–45.3)
LYMPHOCYTES NFR BLD AUTO: 1.5 % (ref 19.6–45.3)
LYMPHOCYTES NFR BLD AUTO: 2.4 % (ref 19.6–45.3)
LYMPHOCYTES NFR BLD AUTO: 3 % (ref 19.6–45.3)
LYMPHOCYTES NFR BLD AUTO: 3.6 % (ref 19.6–45.3)
LYMPHOCYTES NFR BLD AUTO: 4.8 % (ref 19.6–45.3)
LYMPHOCYTES NFR BLD AUTO: 5.2 % (ref 19.6–45.3)
LYMPHOCYTES NFR BLD AUTO: 5.2 % (ref 19.6–45.3)
LYMPHOCYTES NFR BLD AUTO: 6.8 % (ref 19.6–45.3)
LYMPHOCYTES NFR BLD AUTO: 6.8 % (ref 19.6–45.3)
LYMPHOCYTES NFR BLD AUTO: 7.7 % (ref 19.6–45.3)
LYMPHOCYTES NFR BLD AUTO: 7.7 % (ref 19.6–45.3)
LYMPHOCYTES NFR BLD AUTO: 8.2 % (ref 19.6–45.3)
LYMPHOCYTES NFR BLD MANUAL: 1.1 % (ref 5–12)
LYMPHOCYTES NFR BLD MANUAL: 2.1 % (ref 5–12)
LYMPHOCYTES NFR BLD MANUAL: 3 % (ref 5–12)
LYMPHOCYTES NFR BLD MANUAL: 3 % (ref 5–12)
LYMPHOCYTES NFR BLD MANUAL: 4 % (ref 5–12)
LYMPHOCYTES NFR BLD MANUAL: 4 % (ref 5–12)
LYMPHOCYTES NFR BLD MANUAL: 5 % (ref 5–12)
Lab: ABNORMAL
M PNEUMO IGG SER IA-ACNC: NOT DETECTED
MACROCYTES BLD QL SMEAR: ABNORMAL
MAGNESIUM SERPL-MCNC: 2 MG/DL (ref 1.6–2.4)
MAGNESIUM SERPL-MCNC: 3.3 MG/DL (ref 1.6–2.4)
MAGNESIUM SERPL-MCNC: 3.4 MG/DL (ref 1.6–2.4)
MAXIMAL PREDICTED HEART RATE: 151 BPM
MCH RBC QN AUTO: 28.2 PG (ref 26.6–33)
MCH RBC QN AUTO: 28.4 PG (ref 26.6–33)
MCH RBC QN AUTO: 28.9 PG (ref 26.6–33)
MCH RBC QN AUTO: 29 PG (ref 26.6–33)
MCH RBC QN AUTO: 29.2 PG (ref 26.6–33)
MCH RBC QN AUTO: 29.3 PG (ref 26.6–33)
MCH RBC QN AUTO: 29.6 PG (ref 26.6–33)
MCH RBC QN AUTO: 29.6 PG (ref 26.6–33)
MCH RBC QN AUTO: 29.7 PG (ref 26.6–33)
MCH RBC QN AUTO: 29.8 PG (ref 26.6–33)
MCH RBC QN AUTO: 29.9 PG (ref 26.6–33)
MCH RBC QN AUTO: 29.9 PG (ref 26.6–33)
MCH RBC QN AUTO: 30.1 PG (ref 26.6–33)
MCH RBC QN AUTO: 30.2 PG (ref 26.6–33)
MCH RBC QN AUTO: 30.3 PG (ref 26.6–33)
MCH RBC QN AUTO: 30.5 PG (ref 26.6–33)
MCHC RBC AUTO-ENTMCNC: 29.5 G/DL (ref 31.5–35.7)
MCHC RBC AUTO-ENTMCNC: 30.1 G/DL (ref 31.5–35.7)
MCHC RBC AUTO-ENTMCNC: 30.2 G/DL (ref 31.5–35.7)
MCHC RBC AUTO-ENTMCNC: 30.3 G/DL (ref 31.5–35.7)
MCHC RBC AUTO-ENTMCNC: 30.3 G/DL (ref 31.5–35.7)
MCHC RBC AUTO-ENTMCNC: 30.5 G/DL (ref 31.5–35.7)
MCHC RBC AUTO-ENTMCNC: 30.6 G/DL (ref 31.5–35.7)
MCHC RBC AUTO-ENTMCNC: 31 G/DL (ref 31.5–35.7)
MCHC RBC AUTO-ENTMCNC: 31.6 G/DL (ref 31.5–35.7)
MCHC RBC AUTO-ENTMCNC: 31.6 G/DL (ref 31.5–35.7)
MCHC RBC AUTO-ENTMCNC: 31.8 G/DL (ref 31.5–35.7)
MCHC RBC AUTO-ENTMCNC: 31.9 G/DL (ref 31.5–35.7)
MCHC RBC AUTO-ENTMCNC: 32.5 G/DL (ref 31.5–35.7)
MCHC RBC AUTO-ENTMCNC: 32.6 G/DL (ref 31.5–35.7)
MCHC RBC AUTO-ENTMCNC: 32.8 G/DL (ref 31.5–35.7)
MCHC RBC AUTO-ENTMCNC: 33.2 G/DL (ref 31.5–35.7)
MCHC RBC AUTO-ENTMCNC: 33.3 G/DL (ref 31.5–35.7)
MCHC RBC AUTO-ENTMCNC: 33.3 G/DL (ref 31.5–35.7)
MCHC RBC AUTO-ENTMCNC: 33.5 G/DL (ref 31.5–35.7)
MCHC RBC AUTO-ENTMCNC: 33.7 G/DL (ref 31.5–35.7)
MCV RBC AUTO: 100 FL (ref 79–97)
MCV RBC AUTO: 100.7 FL (ref 79–97)
MCV RBC AUTO: 101.5 FL (ref 79–97)
MCV RBC AUTO: 86.2 FL (ref 79–97)
MCV RBC AUTO: 87.3 FL (ref 79–97)
MCV RBC AUTO: 87.8 FL (ref 79–97)
MCV RBC AUTO: 88.3 FL (ref 79–97)
MCV RBC AUTO: 88.9 FL (ref 79–97)
MCV RBC AUTO: 88.9 FL (ref 79–97)
MCV RBC AUTO: 89 FL (ref 79–97)
MCV RBC AUTO: 89.1 FL (ref 79–97)
MCV RBC AUTO: 89.1 FL (ref 79–97)
MCV RBC AUTO: 91.5 FL (ref 79–97)
MCV RBC AUTO: 92.7 FL (ref 79–97)
MCV RBC AUTO: 93.2 FL (ref 79–97)
MCV RBC AUTO: 94.3 FL (ref 79–97)
MCV RBC AUTO: 95.4 FL (ref 79–97)
MCV RBC AUTO: 98.1 FL (ref 79–97)
MCV RBC AUTO: 98.8 FL (ref 79–97)
MCV RBC AUTO: 99.7 FL (ref 79–97)
METAMYELOCYTES NFR BLD MANUAL: 1 % (ref 0–0)
METAMYELOCYTES NFR BLD MANUAL: 3 % (ref 0–0)
METHGB BLD QL: 0.3 % (ref 0–3)
METHGB BLD QL: 0.4 % (ref 0–3)
MODALITY: ABNORMAL
MONOCYTES # BLD AUTO: 0.25 10*3/MM3 (ref 0.1–0.9)
MONOCYTES # BLD AUTO: 0.42 10*3/MM3 (ref 0.1–0.9)
MONOCYTES # BLD AUTO: 0.47 10*3/MM3 (ref 0.1–0.9)
MONOCYTES # BLD AUTO: 0.53 10*3/MM3 (ref 0.1–0.9)
MONOCYTES # BLD AUTO: 0.59 10*3/MM3 (ref 0.1–0.9)
MONOCYTES # BLD AUTO: 0.66 10*3/MM3 (ref 0.1–0.9)
MONOCYTES # BLD AUTO: 0.68 10*3/MM3 (ref 0.1–0.9)
MONOCYTES # BLD AUTO: 0.8 10*3/MM3 (ref 0.1–0.9)
MONOCYTES # BLD AUTO: 0.81 10*3/MM3 (ref 0.1–0.9)
MONOCYTES # BLD AUTO: 0.85 10*3/MM3 (ref 0.1–0.9)
MONOCYTES # BLD AUTO: 0.94 10*3/MM3 (ref 0.1–0.9)
MONOCYTES # BLD AUTO: 0.98 10*3/MM3 (ref 0.1–0.9)
MONOCYTES # BLD AUTO: 1.38 10*3/MM3 (ref 0.1–0.9)
MONOCYTES # BLD: 0.2 10*3/MM3 (ref 0.1–0.9)
MONOCYTES # BLD: 0.41 10*3/MM3 (ref 0.1–0.9)
MONOCYTES # BLD: 0.51 10*3/MM3 (ref 0.1–0.9)
MONOCYTES # BLD: 0.67 10*3/MM3 (ref 0.1–0.9)
MONOCYTES # BLD: 0.98 10*3/MM3 (ref 0.1–0.9)
MONOCYTES # BLD: 1.15 10*3/MM3 (ref 0.1–0.9)
MONOCYTES # BLD: 1.96 10*3/MM3 (ref 0.1–0.9)
MONOCYTES NFR BLD AUTO: 2.1 % (ref 5–12)
MONOCYTES NFR BLD AUTO: 2.8 % (ref 5–12)
MONOCYTES NFR BLD AUTO: 2.9 % (ref 5–12)
MONOCYTES NFR BLD AUTO: 3 % (ref 5–12)
MONOCYTES NFR BLD AUTO: 4 % (ref 5–12)
MONOCYTES NFR BLD AUTO: 4.2 % (ref 5–12)
MONOCYTES NFR BLD AUTO: 4.3 % (ref 5–12)
MONOCYTES NFR BLD AUTO: 5.3 % (ref 5–12)
MONOCYTES NFR BLD AUTO: 5.4 % (ref 5–12)
MONOCYTES NFR BLD AUTO: 5.5 % (ref 5–12)
MONOCYTES NFR BLD AUTO: 6.1 % (ref 5–12)
MONOCYTES NFR BLD AUTO: 7.1 % (ref 5–12)
MONOCYTES NFR BLD AUTO: 7.8 % (ref 5–12)
MRSA DNA SPEC QL NAA+PROBE: NORMAL
MYELOCYTES NFR BLD MANUAL: 1.1 % (ref 0–0)
MYELOCYTES NFR BLD MANUAL: 2 % (ref 0–0)
NEUTROPHILS # BLD AUTO: 11.23 10*3/MM3 (ref 1.7–7)
NEUTROPHILS # BLD AUTO: 12.44 10*3/MM3 (ref 1.7–7)
NEUTROPHILS # BLD AUTO: 15.95 10*3/MM3 (ref 1.7–7)
NEUTROPHILS # BLD AUTO: 16.31 10*3/MM3 (ref 1.7–7)
NEUTROPHILS # BLD AUTO: 35.64 10*3/MM3 (ref 1.7–7)
NEUTROPHILS # BLD AUTO: 35.67 10*3/MM3 (ref 1.7–7)
NEUTROPHILS # BLD AUTO: 45.29 10*3/MM3 (ref 1.7–7)
NEUTROPHILS NFR BLD AUTO: 11.47 10*3/MM3 (ref 1.7–7)
NEUTROPHILS NFR BLD AUTO: 13.3 10*3/MM3 (ref 1.7–7)
NEUTROPHILS NFR BLD AUTO: 14.94 10*3/MM3 (ref 1.7–7)
NEUTROPHILS NFR BLD AUTO: 15.03 10*3/MM3 (ref 1.7–7)
NEUTROPHILS NFR BLD AUTO: 15.51 10*3/MM3 (ref 1.7–7)
NEUTROPHILS NFR BLD AUTO: 16.12 10*3/MM3 (ref 1.7–7)
NEUTROPHILS NFR BLD AUTO: 19.65 10*3/MM3 (ref 1.7–7)
NEUTROPHILS NFR BLD AUTO: 23.64 10*3/MM3 (ref 1.7–7)
NEUTROPHILS NFR BLD AUTO: 26.16 10*3/MM3 (ref 1.7–7)
NEUTROPHILS NFR BLD AUTO: 6.8 10*3/MM3 (ref 1.7–7)
NEUTROPHILS NFR BLD AUTO: 7.31 10*3/MM3 (ref 1.7–7)
NEUTROPHILS NFR BLD AUTO: 82.9 % (ref 42.7–76)
NEUTROPHILS NFR BLD AUTO: 83.3 % (ref 42.7–76)
NEUTROPHILS NFR BLD AUTO: 84.8 % (ref 42.7–76)
NEUTROPHILS NFR BLD AUTO: 85.1 % (ref 42.7–76)
NEUTROPHILS NFR BLD AUTO: 86.3 % (ref 42.7–76)
NEUTROPHILS NFR BLD AUTO: 86.6 % (ref 42.7–76)
NEUTROPHILS NFR BLD AUTO: 88 % (ref 42.7–76)
NEUTROPHILS NFR BLD AUTO: 88.4 % (ref 42.7–76)
NEUTROPHILS NFR BLD AUTO: 89 % (ref 42.7–76)
NEUTROPHILS NFR BLD AUTO: 89.8 % (ref 42.7–76)
NEUTROPHILS NFR BLD AUTO: 9.14 10*3/MM3 (ref 1.7–7)
NEUTROPHILS NFR BLD AUTO: 9.55 10*3/MM3 (ref 1.7–7)
NEUTROPHILS NFR BLD AUTO: 92.3 % (ref 42.7–76)
NEUTROPHILS NFR BLD AUTO: 93.3 % (ref 42.7–76)
NEUTROPHILS NFR BLD AUTO: 93.5 % (ref 42.7–76)
NEUTROPHILS NFR BLD MANUAL: 82 % (ref 42.7–76)
NEUTROPHILS NFR BLD MANUAL: 83.8 % (ref 42.7–76)
NEUTROPHILS NFR BLD MANUAL: 86 % (ref 42.7–76)
NEUTROPHILS NFR BLD MANUAL: 86.9 % (ref 42.7–76)
NEUTROPHILS NFR BLD MANUAL: 88.4 % (ref 42.7–76)
NEUTROPHILS NFR BLD MANUAL: 88.4 % (ref 42.7–76)
NEUTROPHILS NFR BLD MANUAL: 91.9 % (ref 42.7–76)
NEUTS BAND NFR BLD MANUAL: 11 % (ref 0–5)
NEUTS BAND NFR BLD MANUAL: 3 % (ref 0–5)
NEUTS BAND NFR BLD MANUAL: 4 % (ref 0–5)
NEUTS BAND NFR BLD MANUAL: 5 % (ref 0–5)
NEUTS BAND NFR BLD MANUAL: 5.1 % (ref 0–5)
NEUTS BAND NFR BLD MANUAL: 8.4 % (ref 0–5)
NEUTS VAC BLD QL SMEAR: ABNORMAL
NITRITE UR QL STRIP: NEGATIVE
NOTE: ABNORMAL
NOTE: ABNORMAL
NOTIFIED BY: ABNORMAL
NOTIFIED WHO: ABNORMAL
NRBC BLD AUTO-RTO: 0 /100 WBC (ref 0–0.2)
NRBC BLD AUTO-RTO: 0.1 /100 WBC (ref 0–0.2)
NRBC BLD AUTO-RTO: 0.2 /100 WBC (ref 0–0.2)
NRBC BLD AUTO-RTO: 0.4 /100 WBC (ref 0–0.2)
NRBC BLD AUTO-RTO: 0.5 /100 WBC (ref 0–0.2)
NRBC SPEC MANUAL: 1 /100 WBC (ref 0–0.2)
NRBC SPEC MANUAL: 11 /100 WBC (ref 0–0.2)
NRBC SPEC MANUAL: 2.1 /100 WBC (ref 0–0.2)
NRBC SPEC MANUAL: 8 /100 WBC (ref 0–0.2)
NT-PROBNP SERPL-MCNC: 392.4 PG/ML (ref 0–900)
OXYHGB MFR BLDV: 76.1 % (ref 94–99)
OXYHGB MFR BLDV: 92.5 % (ref 94–99)
PAW @ PEAK INSP FLOW SETTING VENT: 15 CMH2O
PCO2 BLDA: 38.1 MM HG (ref 35–45)
PCO2 BLDA: 41.5 MM HG (ref 35–45)
PCO2 BLDA: 42.3 MM HG (ref 35–45)
PCO2 BLDA: 43.7 MM HG (ref 35–45)
PCO2 BLDA: 45.2 MM HG (ref 35–45)
PCO2 BLDA: 48.1 MM HG (ref 35–45)
PCO2 BLDA: 51.9 MM HG (ref 35–45)
PCO2 BLDA: 56.9 MM HG (ref 35–45)
PCO2 BLDA: 60.4 MM HG (ref 35–45)
PCO2 BLDA: 60.4 MM HG (ref 35–45)
PCO2 BLDA: 60.8 MM HG (ref 35–45)
PCO2 BLDA: 62.3 MM HG (ref 35–45)
PCO2 BLDA: 64.4 MM HG (ref 35–45)
PCO2 BLDA: 66.3 MM HG (ref 35–45)
PCO2 BLDA: 67.4 MM HG (ref 35–45)
PCO2 BLDA: 68 MM HG (ref 35–45)
PCO2 TEMP ADJ BLD: 38.1 MM HG (ref 35–45)
PCO2 TEMP ADJ BLD: 41.5 MM HG (ref 35–45)
PCO2 TEMP ADJ BLD: 42.3 MM HG (ref 35–45)
PCO2 TEMP ADJ BLD: 43.7 MM HG (ref 35–45)
PCO2 TEMP ADJ BLD: 45.2 MM HG (ref 35–45)
PCO2 TEMP ADJ BLD: 48.1 MM HG (ref 35–45)
PCO2 TEMP ADJ BLD: 51.9 MM HG (ref 35–45)
PCO2 TEMP ADJ BLD: 56.9 MM HG (ref 35–45)
PCO2 TEMP ADJ BLD: 60.4 MM HG (ref 35–45)
PCO2 TEMP ADJ BLD: 60.4 MM HG (ref 35–45)
PCO2 TEMP ADJ BLD: 60.8 MM HG (ref 35–45)
PCO2 TEMP ADJ BLD: 62.3 MM HG (ref 35–45)
PCO2 TEMP ADJ BLD: 64.4 MM HG (ref 35–45)
PCO2 TEMP ADJ BLD: 66.3 MM HG (ref 35–45)
PCO2 TEMP ADJ BLD: 67.4 MM HG (ref 35–45)
PCO2 TEMP ADJ BLD: 68 MM HG (ref 35–45)
PEEP RESPIRATORY: 10 CM[H2O]
PEEP RESPIRATORY: 12 CM[H2O]
PEEP RESPIRATORY: 14 CM[H2O]
PEEP RESPIRATORY: 15 CM[H2O]
PEEP RESPIRATORY: 15 CM[H2O]
PH BLDA: 7.08 PH UNITS (ref 7.35–7.45)
PH BLDA: 7.12 PH UNITS (ref 7.35–7.45)
PH BLDA: 7.22 PH UNITS (ref 7.35–7.45)
PH BLDA: 7.24 PH UNITS (ref 7.35–7.45)
PH BLDA: 7.25 PH UNITS (ref 7.35–7.45)
PH BLDA: 7.26 PH UNITS (ref 7.35–7.45)
PH BLDA: 7.32 PH UNITS (ref 7.35–7.45)
PH BLDA: 7.32 PH UNITS (ref 7.35–7.45)
PH BLDA: 7.33 PH UNITS (ref 7.35–7.45)
PH BLDA: 7.33 PH UNITS (ref 7.35–7.45)
PH BLDA: 7.38 PH UNITS (ref 7.35–7.45)
PH BLDA: 7.38 PH UNITS (ref 7.35–7.45)
PH BLDA: 7.4 PH UNITS (ref 7.35–7.45)
PH BLDA: 7.41 PH UNITS (ref 7.35–7.45)
PH BLDA: 7.43 PH UNITS (ref 7.35–7.45)
PH BLDA: 7.43 PH UNITS (ref 7.35–7.45)
PH UR STRIP.AUTO: <=5 [PH] (ref 5–8)
PH, TEMP CORRECTED: 7.08 PH UNITS (ref 7.35–7.45)
PH, TEMP CORRECTED: 7.12 PH UNITS (ref 7.35–7.45)
PH, TEMP CORRECTED: 7.22 PH UNITS (ref 7.35–7.45)
PH, TEMP CORRECTED: 7.24 PH UNITS (ref 7.35–7.45)
PH, TEMP CORRECTED: 7.25 PH UNITS (ref 7.35–7.45)
PH, TEMP CORRECTED: 7.26 PH UNITS (ref 7.35–7.45)
PH, TEMP CORRECTED: 7.32 PH UNITS (ref 7.35–7.45)
PH, TEMP CORRECTED: 7.32 PH UNITS (ref 7.35–7.45)
PH, TEMP CORRECTED: 7.33 PH UNITS (ref 7.35–7.45)
PH, TEMP CORRECTED: 7.33 PH UNITS (ref 7.35–7.45)
PH, TEMP CORRECTED: 7.38 PH UNITS (ref 7.35–7.45)
PH, TEMP CORRECTED: 7.38 PH UNITS (ref 7.35–7.45)
PH, TEMP CORRECTED: 7.4 PH UNITS (ref 7.35–7.45)
PH, TEMP CORRECTED: 7.41 PH UNITS (ref 7.35–7.45)
PH, TEMP CORRECTED: 7.43 PH UNITS (ref 7.35–7.45)
PH, TEMP CORRECTED: 7.43 PH UNITS (ref 7.35–7.45)
PLAT MORPH BLD: NORMAL
PLATELET # BLD AUTO: 128 10*3/MM3 (ref 140–450)
PLATELET # BLD AUTO: 130 10*3/MM3 (ref 140–450)
PLATELET # BLD AUTO: 132 10*3/MM3 (ref 140–450)
PLATELET # BLD AUTO: 139 10*3/MM3 (ref 140–450)
PLATELET # BLD AUTO: 141 10*3/MM3 (ref 140–450)
PLATELET # BLD AUTO: 143 10*3/MM3 (ref 140–450)
PLATELET # BLD AUTO: 158 10*3/MM3 (ref 140–450)
PLATELET # BLD AUTO: 168 10*3/MM3 (ref 140–450)
PLATELET # BLD AUTO: 178 10*3/MM3 (ref 140–450)
PLATELET # BLD AUTO: 181 10*3/MM3 (ref 140–450)
PLATELET # BLD AUTO: 183 10*3/MM3 (ref 140–450)
PLATELET # BLD AUTO: 183 10*3/MM3 (ref 140–450)
PLATELET # BLD AUTO: 191 10*3/MM3 (ref 140–450)
PLATELET # BLD AUTO: 195 10*3/MM3 (ref 140–450)
PLATELET # BLD AUTO: 207 10*3/MM3 (ref 140–450)
PLATELET # BLD AUTO: 223 10*3/MM3 (ref 140–450)
PLATELET # BLD AUTO: 242 10*3/MM3 (ref 140–450)
PLATELET # BLD AUTO: 252 10*3/MM3 (ref 140–450)
PLATELET # BLD AUTO: 275 10*3/MM3 (ref 140–450)
PLATELET # BLD AUTO: 84 10*3/MM3 (ref 140–450)
PMV BLD AUTO: 10.1 FL (ref 6–12)
PMV BLD AUTO: 10.5 FL (ref 6–12)
PMV BLD AUTO: 10.5 FL (ref 6–12)
PMV BLD AUTO: 10.6 FL (ref 6–12)
PMV BLD AUTO: 11 FL (ref 6–12)
PMV BLD AUTO: 11 FL (ref 6–12)
PMV BLD AUTO: 11.1 FL (ref 6–12)
PMV BLD AUTO: 11.1 FL (ref 6–12)
PMV BLD AUTO: 11.3 FL (ref 6–12)
PMV BLD AUTO: 11.4 FL (ref 6–12)
PMV BLD AUTO: 11.4 FL (ref 6–12)
PMV BLD AUTO: 11.5 FL (ref 6–12)
PMV BLD AUTO: 12.1 FL (ref 6–12)
PMV BLD AUTO: 13.1 FL (ref 6–12)
PMV BLD AUTO: 9.8 FL (ref 6–12)
PMV BLD AUTO: 9.9 FL (ref 6–12)
PO2 BLDA: 165 MM HG (ref 83–108)
PO2 BLDA: 44.7 MM HG (ref 83–108)
PO2 BLDA: 48.9 MM HG (ref 83–108)
PO2 BLDA: 57.5 MM HG (ref 83–108)
PO2 BLDA: 62.7 MM HG (ref 83–108)
PO2 BLDA: 63.6 MM HG (ref 83–108)
PO2 BLDA: 67.4 MM HG (ref 83–108)
PO2 BLDA: 68.4 MM HG (ref 83–108)
PO2 BLDA: 68.7 MM HG (ref 83–108)
PO2 BLDA: 70.5 MM HG (ref 83–108)
PO2 BLDA: 70.6 MM HG (ref 83–108)
PO2 BLDA: 70.8 MM HG (ref 83–108)
PO2 BLDA: 71.3 MM HG (ref 83–108)
PO2 BLDA: 72.2 MM HG (ref 83–108)
PO2 BLDA: 76.4 MM HG (ref 83–108)
PO2 BLDA: 91.5 MM HG (ref 83–108)
PO2 TEMP ADJ BLD: 165 MM HG (ref 83–108)
PO2 TEMP ADJ BLD: 44.7 MM HG (ref 83–108)
PO2 TEMP ADJ BLD: 48.9 MM HG (ref 83–108)
PO2 TEMP ADJ BLD: 57.5 MM HG (ref 83–108)
PO2 TEMP ADJ BLD: 62.7 MM HG (ref 83–108)
PO2 TEMP ADJ BLD: 63.6 MM HG (ref 83–108)
PO2 TEMP ADJ BLD: 67.4 MM HG (ref 83–108)
PO2 TEMP ADJ BLD: 68.4 MM HG (ref 83–108)
PO2 TEMP ADJ BLD: 68.7 MM HG (ref 83–108)
PO2 TEMP ADJ BLD: 70.5 MM HG (ref 83–108)
PO2 TEMP ADJ BLD: 70.6 MM HG (ref 83–108)
PO2 TEMP ADJ BLD: 70.8 MM HG (ref 83–108)
PO2 TEMP ADJ BLD: 71.3 MM HG (ref 83–108)
PO2 TEMP ADJ BLD: 72.2 MM HG (ref 83–108)
PO2 TEMP ADJ BLD: 76.4 MM HG (ref 83–108)
PO2 TEMP ADJ BLD: 91.5 MM HG (ref 83–108)
POIKILOCYTOSIS BLD QL SMEAR: ABNORMAL
POLYCHROMASIA BLD QL SMEAR: ABNORMAL
POTASSIUM BLDA-SCNC: 5.2 MMOL/L (ref 3.5–5.2)
POTASSIUM BLDA-SCNC: 6.9 MMOL/L (ref 3.5–5.2)
POTASSIUM SERPL-SCNC: 4.1 MMOL/L (ref 3.5–5.2)
POTASSIUM SERPL-SCNC: 4.9 MMOL/L (ref 3.5–5.2)
POTASSIUM SERPL-SCNC: 5 MMOL/L (ref 3.5–5.2)
POTASSIUM SERPL-SCNC: 5 MMOL/L (ref 3.5–5.2)
POTASSIUM SERPL-SCNC: 5.1 MMOL/L (ref 3.5–5.2)
POTASSIUM SERPL-SCNC: 5.1 MMOL/L (ref 3.5–5.2)
POTASSIUM SERPL-SCNC: 5.2 MMOL/L (ref 3.5–5.2)
POTASSIUM SERPL-SCNC: 5.2 MMOL/L (ref 3.5–5.2)
POTASSIUM SERPL-SCNC: 5.4 MMOL/L (ref 3.5–5.2)
POTASSIUM SERPL-SCNC: 5.5 MMOL/L (ref 3.5–5.2)
POTASSIUM SERPL-SCNC: 5.6 MMOL/L (ref 3.5–5.2)
POTASSIUM SERPL-SCNC: 5.6 MMOL/L (ref 3.5–5.2)
POTASSIUM SERPL-SCNC: 5.7 MMOL/L (ref 3.5–5.2)
POTASSIUM SERPL-SCNC: 5.7 MMOL/L (ref 3.5–5.2)
POTASSIUM SERPL-SCNC: 5.8 MMOL/L (ref 3.5–5.2)
POTASSIUM SERPL-SCNC: 5.8 MMOL/L (ref 3.5–5.2)
POTASSIUM SERPL-SCNC: 6 MMOL/L (ref 3.5–5.2)
POTASSIUM SERPL-SCNC: 6.3 MMOL/L (ref 3.5–5.2)
POTASSIUM SERPL-SCNC: 6.4 MMOL/L (ref 3.5–5.2)
POTASSIUM SERPL-SCNC: 6.7 MMOL/L (ref 3.5–5.2)
POTASSIUM SERPL-SCNC: 6.8 MMOL/L (ref 3.5–5.2)
POTASSIUM SERPL-SCNC: 6.9 MMOL/L (ref 3.5–5.2)
POTASSIUM SERPL-SCNC: 7.2 MMOL/L (ref 3.5–5.2)
PROCALCITONIN SERPL-MCNC: 0.09 NG/ML (ref 0–0.25)
PROCALCITONIN SERPL-MCNC: 0.11 NG/ML (ref 0–0.25)
PROCALCITONIN SERPL-MCNC: 0.13 NG/ML (ref 0–0.25)
PROCALCITONIN SERPL-MCNC: 0.15 NG/ML (ref 0–0.25)
PROCALCITONIN SERPL-MCNC: 0.22 NG/ML (ref 0–0.25)
PROCALCITONIN SERPL-MCNC: 0.27 NG/ML (ref 0–0.25)
PROCALCITONIN SERPL-MCNC: 0.27 NG/ML (ref 0–0.25)
PROCALCITONIN SERPL-MCNC: 0.4 NG/ML (ref 0–0.25)
PROCALCITONIN SERPL-MCNC: 1.01 NG/ML (ref 0–0.25)
PROCALCITONIN SERPL-MCNC: 1.05 NG/ML (ref 0–0.25)
PROCALCITONIN SERPL-MCNC: 1.65 NG/ML (ref 0–0.25)
PROT SERPL-MCNC: 4.9 G/DL (ref 6–8.5)
PROT SERPL-MCNC: 5.1 G/DL (ref 6–8.5)
PROT SERPL-MCNC: 5.2 G/DL (ref 6–8.5)
PROT SERPL-MCNC: 5.3 G/DL (ref 6–8.5)
PROT SERPL-MCNC: 5.4 G/DL (ref 6–8.5)
PROT SERPL-MCNC: 5.5 G/DL (ref 6–8.5)
PROT SERPL-MCNC: 5.8 G/DL (ref 6–8.5)
PROT SERPL-MCNC: 5.9 G/DL (ref 6–8.5)
PROT SERPL-MCNC: 5.9 G/DL (ref 6–8.5)
PROT SERPL-MCNC: 6.1 G/DL (ref 6–8.5)
PROT SERPL-MCNC: 6.1 G/DL (ref 6–8.5)
PROT SERPL-MCNC: 6.3 G/DL (ref 6–8.5)
PROT SERPL-MCNC: 6.6 G/DL (ref 6–8.5)
PROT SERPL-MCNC: 6.6 G/DL (ref 6–8.5)
PROT SERPL-MCNC: 6.9 G/DL (ref 6–8.5)
PROT SERPL-MCNC: 7 G/DL (ref 6–8.5)
PROT SERPL-MCNC: 7.1 G/DL (ref 6–8.5)
PROT UR QL STRIP: NEGATIVE
PROTHROMBIN TIME: 14.8 SECONDS (ref 11.9–14.6)
PROTHROMBIN TIME: 16 SECONDS (ref 11.9–14.6)
PROTHROMBIN TIME: 16.9 SECONDS (ref 11.9–14.6)
QT INTERVAL: 344 MS
QT INTERVAL: 378 MS
QT INTERVAL: 446 MS
QTC INTERVAL: 439 MS
QTC INTERVAL: 449 MS
QTC INTERVAL: 533 MS
RBC # BLD AUTO: 2.95 10*6/MM3 (ref 4.14–5.8)
RBC # BLD AUTO: 3.04 10*6/MM3 (ref 4.14–5.8)
RBC # BLD AUTO: 3.16 10*6/MM3 (ref 4.14–5.8)
RBC # BLD AUTO: 3.31 10*6/MM3 (ref 4.14–5.8)
RBC # BLD AUTO: 3.34 10*6/MM3 (ref 4.14–5.8)
RBC # BLD AUTO: 3.35 10*6/MM3 (ref 4.14–5.8)
RBC # BLD AUTO: 3.73 10*6/MM3 (ref 4.14–5.8)
RBC # BLD AUTO: 3.86 10*6/MM3 (ref 4.14–5.8)
RBC # BLD AUTO: 4.13 10*6/MM3 (ref 4.14–5.8)
RBC # BLD AUTO: 4.25 10*6/MM3 (ref 4.14–5.8)
RBC # BLD AUTO: 4.48 10*6/MM3 (ref 4.14–5.8)
RBC # BLD AUTO: 4.5 10*6/MM3 (ref 4.14–5.8)
RBC # BLD AUTO: 4.6 10*6/MM3 (ref 4.14–5.8)
RBC # BLD AUTO: 4.76 10*6/MM3 (ref 4.14–5.8)
RBC # BLD AUTO: 4.85 10*6/MM3 (ref 4.14–5.8)
RBC # BLD AUTO: 4.94 10*6/MM3 (ref 4.14–5.8)
RBC # BLD AUTO: 4.99 10*6/MM3 (ref 4.14–5.8)
RBC # BLD AUTO: 5.07 10*6/MM3 (ref 4.14–5.8)
RBC # BLD AUTO: 5.21 10*6/MM3 (ref 4.14–5.8)
RBC # BLD AUTO: 5.22 10*6/MM3 (ref 4.14–5.8)
RBC MORPH BLD: NORMAL
RHINOVIRUS RNA SPEC NAA+PROBE: NOT DETECTED
RSV RNA NPH QL NAA+NON-PROBE: NOT DETECTED
S PNEUM AG SPEC QL LA: NEGATIVE
SAO2 % BLDCOA: 78.8 % (ref 94–99)
SAO2 % BLDCOA: 79.8 % (ref 94–99)
SAO2 % BLDCOA: 81.9 % (ref 94–99)
SAO2 % BLDCOA: 84.4 % (ref 94–99)
SAO2 % BLDCOA: 90.1 % (ref 94–99)
SAO2 % BLDCOA: 91 % (ref 94–99)
SAO2 % BLDCOA: 91.3 % (ref 94–99)
SAO2 % BLDCOA: 91.5 % (ref 94–99)
SAO2 % BLDCOA: 92.4 % (ref 94–99)
SAO2 % BLDCOA: 92.5 % (ref 94–99)
SAO2 % BLDCOA: 93.9 % (ref 94–99)
SAO2 % BLDCOA: 94.3 % (ref 94–99)
SAO2 % BLDCOA: 94.6 % (ref 94–99)
SAO2 % BLDCOA: 95 % (ref 94–99)
SAO2 % BLDCOA: 96.7 % (ref 94–99)
SAO2 % BLDCOA: 99.6 % (ref 94–99)
SARS-COV-2 RNA PNL SPEC NAA+PROBE: DETECTED
SET MECH RESP RATE: 16
SET MECH RESP RATE: 16
SET MECH RESP RATE: 25
SET MECH RESP RATE: 25
SET MECH RESP RATE: 30
SET MECH RESP RATE: 32
SMALL PLATELETS BLD QL SMEAR: ABNORMAL
SMALL PLATELETS BLD QL SMEAR: ABNORMAL
SODIUM BLDA-SCNC: 129 MMOL/L (ref 136–145)
SODIUM BLDA-SCNC: 131 MMOL/L (ref 136–145)
SODIUM SERPL-SCNC: 125 MMOL/L (ref 136–145)
SODIUM SERPL-SCNC: 127 MMOL/L (ref 136–145)
SODIUM SERPL-SCNC: 130 MMOL/L (ref 136–145)
SODIUM SERPL-SCNC: 131 MMOL/L (ref 136–145)
SODIUM SERPL-SCNC: 132 MMOL/L (ref 136–145)
SODIUM SERPL-SCNC: 133 MMOL/L (ref 136–145)
SODIUM SERPL-SCNC: 133 MMOL/L (ref 136–145)
SODIUM SERPL-SCNC: 134 MMOL/L (ref 136–145)
SODIUM SERPL-SCNC: 135 MMOL/L (ref 136–145)
SODIUM SERPL-SCNC: 136 MMOL/L (ref 136–145)
SODIUM SERPL-SCNC: 137 MMOL/L (ref 136–145)
SODIUM SERPL-SCNC: 137 MMOL/L (ref 136–145)
SODIUM SERPL-SCNC: 138 MMOL/L (ref 136–145)
SODIUM SERPL-SCNC: 138 MMOL/L (ref 136–145)
SODIUM SERPL-SCNC: 139 MMOL/L (ref 136–145)
SODIUM SERPL-SCNC: 141 MMOL/L (ref 136–145)
SODIUM SERPL-SCNC: 141 MMOL/L (ref 136–145)
SODIUM SERPL-SCNC: 143 MMOL/L (ref 136–145)
SODIUM SERPL-SCNC: 144 MMOL/L (ref 136–145)
SODIUM SERPL-SCNC: 145 MMOL/L (ref 136–145)
SODIUM SERPL-SCNC: 145 MMOL/L (ref 136–145)
SODIUM SERPL-SCNC: 146 MMOL/L (ref 136–145)
SP GR UR STRIP: 1.02 (ref 1–1.03)
SPHEROCYTES BLD QL SMEAR: ABNORMAL
SPHEROCYTES BLD QL SMEAR: ABNORMAL
STRESS TARGET HR: 128 BPM
TOXIC GRANULATION: ABNORMAL
TRIGL SERPL-MCNC: 324 MG/DL (ref 0–150)
TROPONIN T SERPL-MCNC: <0.01 NG/ML (ref 0–0.03)
TSH SERPL DL<=0.05 MIU/L-ACNC: 3.89 UIU/ML (ref 0.27–4.2)
UROBILINOGEN UR QL STRIP: ABNORMAL
VARIANT LYMPHS NFR BLD MANUAL: 0 % (ref 19.6–45.3)
VARIANT LYMPHS NFR BLD MANUAL: 1 % (ref 19.6–45.3)
VARIANT LYMPHS NFR BLD MANUAL: 1 % (ref 19.6–45.3)
VARIANT LYMPHS NFR BLD MANUAL: 1.1 % (ref 19.6–45.3)
VARIANT LYMPHS NFR BLD MANUAL: 3 % (ref 19.6–45.3)
VARIANT LYMPHS NFR BLD MANUAL: 7.1 % (ref 19.6–45.3)
VARIANT LYMPHS NFR BLD MANUAL: 7.4 % (ref 19.6–45.3)
VENTILATOR MODE: ABNORMAL
VENTILATOR MODE: AC
VLDLC SERPL-MCNC: 55 MG/DL (ref 5–40)
VT ON VENT VENT: 450 ML
VT ON VENT VENT: 480 ML
VT ON VENT VENT: 500 ML
VT ON VENT VENT: 550 ML
WBC # BLD AUTO: 7.89 10*3/MM3 (ref 3.4–10.8)
WBC # BLD AUTO: 8.31 10*3/MM3 (ref 3.4–10.8)
WBC MORPH BLD: NORMAL
WBC NRBC COR # BLD: 10.78 10*3/MM3 (ref 3.4–10.8)
WBC NRBC COR # BLD: 10.8 10*3/MM3 (ref 3.4–10.8)
WBC NRBC COR # BLD: 12.78 10*3/MM3 (ref 3.4–10.8)
WBC NRBC COR # BLD: 13.53 10*3/MM3 (ref 3.4–10.8)
WBC NRBC COR # BLD: 13.77 10*3/MM3 (ref 3.4–10.8)
WBC NRBC COR # BLD: 16.07 10*3/MM3 (ref 3.4–10.8)
WBC NRBC COR # BLD: 16.65 10*3/MM3 (ref 3.4–10.8)
WBC NRBC COR # BLD: 16.8 10*3/MM3 (ref 3.4–10.8)
WBC NRBC COR # BLD: 17.44 10*3/MM3 (ref 3.4–10.8)
WBC NRBC COR # BLD: 17.66 10*3/MM3 (ref 3.4–10.8)
WBC NRBC COR # BLD: 18.45 10*3/MM3 (ref 3.4–10.8)
WBC NRBC COR # BLD: 18.61 10*3/MM3 (ref 3.4–10.8)
WBC NRBC COR # BLD: 21.3 10*3/MM3 (ref 3.4–10.8)
WBC NRBC COR # BLD: 25.28 10*3/MM3 (ref 3.4–10.8)
WBC NRBC COR # BLD: 28 10*3/MM3 (ref 3.4–10.8)
WBC NRBC COR # BLD: 38.35 10*3/MM3 (ref 3.4–10.8)
WBC NRBC COR # BLD: 39.16 10*3/MM3 (ref 3.4–10.8)
WBC NRBC COR # BLD: 46.77 10*3/MM3 (ref 3.4–10.8)
WHOLE BLOOD HOLD SPECIMEN: NORMAL
WHOLE BLOOD HOLD SPECIMEN: NORMAL

## 2021-01-01 PROCEDURE — 82728 ASSAY OF FERRITIN: CPT | Performed by: INTERNAL MEDICINE

## 2021-01-01 PROCEDURE — 94799 UNLISTED PULMONARY SVC/PX: CPT

## 2021-01-01 PROCEDURE — 85007 BL SMEAR W/DIFF WBC COUNT: CPT | Performed by: FAMILY MEDICINE

## 2021-01-01 PROCEDURE — 63710000001 INSULIN REGULAR HUMAN PER 5 UNITS: Performed by: FAMILY MEDICINE

## 2021-01-01 PROCEDURE — 99231 SBSQ HOSP IP/OBS SF/LOW 25: CPT | Performed by: INTERNAL MEDICINE

## 2021-01-01 PROCEDURE — 82805 BLOOD GASES W/O2 SATURATION: CPT

## 2021-01-01 PROCEDURE — 25010000002 FENTANYL CITRATE (PF) 2500 MCG/50ML SOLUTION: Performed by: FAMILY MEDICINE

## 2021-01-01 PROCEDURE — 84145 PROCALCITONIN (PCT): CPT | Performed by: FAMILY MEDICINE

## 2021-01-01 PROCEDURE — 93010 ELECTROCARDIOGRAM REPORT: CPT | Performed by: INTERNAL MEDICINE

## 2021-01-01 PROCEDURE — 99233 SBSQ HOSP IP/OBS HIGH 50: CPT | Performed by: INTERNAL MEDICINE

## 2021-01-01 PROCEDURE — 82962 GLUCOSE BLOOD TEST: CPT

## 2021-01-01 PROCEDURE — 25010000002 FENTANYL 10 MCG/1 ML NS: Performed by: INTERNAL MEDICINE

## 2021-01-01 PROCEDURE — 80053 COMPREHEN METABOLIC PANEL: CPT | Performed by: FAMILY MEDICINE

## 2021-01-01 PROCEDURE — 25010000002 CEFTRIAXONE PER 250 MG: Performed by: INTERNAL MEDICINE

## 2021-01-01 PROCEDURE — 85025 COMPLETE CBC W/AUTO DIFF WBC: CPT | Performed by: INTERNAL MEDICINE

## 2021-01-01 PROCEDURE — 25010000002 ENOXAPARIN PER 10 MG: Performed by: FAMILY MEDICINE

## 2021-01-01 PROCEDURE — 86140 C-REACTIVE PROTEIN: CPT | Performed by: FAMILY MEDICINE

## 2021-01-01 PROCEDURE — 63710000001 DEXAMETHASONE PER 0.25 MG: Performed by: INTERNAL MEDICINE

## 2021-01-01 PROCEDURE — 93005 ELECTROCARDIOGRAM TRACING: CPT | Performed by: FAMILY MEDICINE

## 2021-01-01 PROCEDURE — 85025 COMPLETE CBC W/AUTO DIFF WBC: CPT | Performed by: STUDENT IN AN ORGANIZED HEALTH CARE EDUCATION/TRAINING PROGRAM

## 2021-01-01 PROCEDURE — 25010000002 AMIODARONE PER 30 MG

## 2021-01-01 PROCEDURE — 82375 ASSAY CARBOXYHB QUANT: CPT

## 2021-01-01 PROCEDURE — 86140 C-REACTIVE PROTEIN: CPT | Performed by: INTERNAL MEDICINE

## 2021-01-01 PROCEDURE — 25010000002 PHENYLEPHRINE 10 MG/ML SOLUTION 5 ML VIAL: Performed by: INTERNAL MEDICINE

## 2021-01-01 PROCEDURE — 63710000001 INSULIN LISPRO (HUMAN) PER 5 UNITS: Performed by: INTERNAL MEDICINE

## 2021-01-01 PROCEDURE — 94003 VENT MGMT INPAT SUBQ DAY: CPT

## 2021-01-01 PROCEDURE — 63710000001 INSULIN LISPRO (HUMAN) PER 5 UNITS: Performed by: FAMILY MEDICINE

## 2021-01-01 PROCEDURE — 82803 BLOOD GASES ANY COMBINATION: CPT

## 2021-01-01 PROCEDURE — 25010000002 DEXAMETHASONE PER 1 MG: Performed by: INTERNAL MEDICINE

## 2021-01-01 PROCEDURE — 99233 SBSQ HOSP IP/OBS HIGH 50: CPT | Performed by: CLINICAL NURSE SPECIALIST

## 2021-01-01 PROCEDURE — 83735 ASSAY OF MAGNESIUM: CPT | Performed by: INTERNAL MEDICINE

## 2021-01-01 PROCEDURE — 83615 LACTATE (LD) (LDH) ENZYME: CPT | Performed by: INTERNAL MEDICINE

## 2021-01-01 PROCEDURE — 71275 CT ANGIOGRAPHY CHEST: CPT

## 2021-01-01 PROCEDURE — 99233 SBSQ HOSP IP/OBS HIGH 50: CPT | Performed by: NURSE PRACTITIONER

## 2021-01-01 PROCEDURE — 71045 X-RAY EXAM CHEST 1 VIEW: CPT

## 2021-01-01 PROCEDURE — 87077 CULTURE AEROBIC IDENTIFY: CPT | Performed by: INTERNAL MEDICINE

## 2021-01-01 PROCEDURE — 85025 COMPLETE CBC W/AUTO DIFF WBC: CPT | Performed by: FAMILY MEDICINE

## 2021-01-01 PROCEDURE — 25010000002 CALCIUM GLUCONATE PER 10 ML: Performed by: FAMILY MEDICINE

## 2021-01-01 PROCEDURE — 83036 HEMOGLOBIN GLYCOSYLATED A1C: CPT | Performed by: FAMILY MEDICINE

## 2021-01-01 PROCEDURE — 36600 WITHDRAWAL OF ARTERIAL BLOOD: CPT

## 2021-01-01 PROCEDURE — 81003 URINALYSIS AUTO W/O SCOPE: CPT | Performed by: INTERNAL MEDICINE

## 2021-01-01 PROCEDURE — 25010000002 FENTANYL CITRATE (PF) 2500 MCG/50ML SOLUTION: Performed by: INTERNAL MEDICINE

## 2021-01-01 PROCEDURE — 84443 ASSAY THYROID STIM HORMONE: CPT | Performed by: FAMILY MEDICINE

## 2021-01-01 PROCEDURE — 25010000002 PROPOFOL 10 MG/ML EMULSION: Performed by: FAMILY MEDICINE

## 2021-01-01 PROCEDURE — 83615 LACTATE (LD) (LDH) ENZYME: CPT | Performed by: FAMILY MEDICINE

## 2021-01-01 PROCEDURE — 85379 FIBRIN DEGRADATION QUANT: CPT | Performed by: INTERNAL MEDICINE

## 2021-01-01 PROCEDURE — 25010000002 PIPERACILLIN SOD-TAZOBACTAM PER 1 G: Performed by: INTERNAL MEDICINE

## 2021-01-01 PROCEDURE — 99285 EMERGENCY DEPT VISIT HI MDM: CPT

## 2021-01-01 PROCEDURE — 80053 COMPREHEN METABOLIC PANEL: CPT | Performed by: INTERNAL MEDICINE

## 2021-01-01 PROCEDURE — 25010000002 ONDANSETRON PER 1 MG: Performed by: INTERNAL MEDICINE

## 2021-01-01 PROCEDURE — 83036 HEMOGLOBIN GLYCOSYLATED A1C: CPT | Performed by: INTERNAL MEDICINE

## 2021-01-01 PROCEDURE — 84484 ASSAY OF TROPONIN QUANT: CPT | Performed by: STUDENT IN AN ORGANIZED HEALTH CARE EDUCATION/TRAINING PROGRAM

## 2021-01-01 PROCEDURE — 99231 SBSQ HOSP IP/OBS SF/LOW 25: CPT | Performed by: SURGERY

## 2021-01-01 PROCEDURE — 87899 AGENT NOS ASSAY W/OPTIC: CPT | Performed by: INTERNAL MEDICINE

## 2021-01-01 PROCEDURE — 25010000002 DEXAMETHASONE PER 1 MG: Performed by: FAMILY MEDICINE

## 2021-01-01 PROCEDURE — 84145 PROCALCITONIN (PCT): CPT | Performed by: INTERNAL MEDICINE

## 2021-01-01 PROCEDURE — 80053 COMPREHEN METABOLIC PANEL: CPT | Performed by: STUDENT IN AN ORGANIZED HEALTH CARE EDUCATION/TRAINING PROGRAM

## 2021-01-01 PROCEDURE — 82728 ASSAY OF FERRITIN: CPT | Performed by: FAMILY MEDICINE

## 2021-01-01 PROCEDURE — 93306 TTE W/DOPPLER COMPLETE: CPT

## 2021-01-01 PROCEDURE — 94002 VENT MGMT INPAT INIT DAY: CPT

## 2021-01-01 PROCEDURE — 99223 1ST HOSP IP/OBS HIGH 75: CPT | Performed by: INTERNAL MEDICINE

## 2021-01-01 PROCEDURE — 25010000002 MEROPENEM PER 100 MG: Performed by: INTERNAL MEDICINE

## 2021-01-01 PROCEDURE — 87899 AGENT NOS ASSAY W/OPTIC: CPT | Performed by: FAMILY MEDICINE

## 2021-01-01 PROCEDURE — 85610 PROTHROMBIN TIME: CPT | Performed by: INTERNAL MEDICINE

## 2021-01-01 PROCEDURE — M0249 HC INTRAVENOUS INFUSION TOCILIZUMAB 1ST DOSE: HCPCS | Performed by: NURSE PRACTITIONER

## 2021-01-01 PROCEDURE — 80061 LIPID PANEL: CPT | Performed by: FAMILY MEDICINE

## 2021-01-01 PROCEDURE — 25010000002 PROPOFOL 1000 MG/100ML EMULSION: Performed by: FAMILY MEDICINE

## 2021-01-01 PROCEDURE — 25010000002 DEXAMETHASONE SODIUM PHOSPHATE 10 MG/ML SOLUTION: Performed by: INTERNAL MEDICINE

## 2021-01-01 PROCEDURE — 99497 ADVNCD CARE PLAN 30 MIN: CPT | Performed by: CLINICAL NURSE SPECIALIST

## 2021-01-01 PROCEDURE — 93970 EXTREMITY STUDY: CPT

## 2021-01-01 PROCEDURE — 99221 1ST HOSP IP/OBS SF/LOW 40: CPT | Performed by: NURSE PRACTITIONER

## 2021-01-01 PROCEDURE — 87635 SARS-COV-2 COVID-19 AMP PRB: CPT | Performed by: STUDENT IN AN ORGANIZED HEALTH CARE EDUCATION/TRAINING PROGRAM

## 2021-01-01 PROCEDURE — 93306 TTE W/DOPPLER COMPLETE: CPT | Performed by: INTERNAL MEDICINE

## 2021-01-01 PROCEDURE — 99223 1ST HOSP IP/OBS HIGH 75: CPT | Performed by: SURGERY

## 2021-01-01 PROCEDURE — 83735 ASSAY OF MAGNESIUM: CPT | Performed by: NURSE PRACTITIONER

## 2021-01-01 PROCEDURE — 63710000001 INSULIN REGULAR HUMAN PER 5 UNITS: Performed by: INTERNAL MEDICINE

## 2021-01-01 PROCEDURE — 25010000002 FLUCONAZOLE PER 200 MG: Performed by: INTERNAL MEDICINE

## 2021-01-01 PROCEDURE — 25010000002 TOCILIZUMAB 400 MG/20ML SOLUTION 20 ML VIAL: Performed by: NURSE PRACTITIONER

## 2021-01-01 PROCEDURE — 99222 1ST HOSP IP/OBS MODERATE 55: CPT | Performed by: INTERNAL MEDICINE

## 2021-01-01 PROCEDURE — 74018 RADEX ABDOMEN 1 VIEW: CPT

## 2021-01-01 PROCEDURE — 87186 SC STD MICRODIL/AGAR DIL: CPT | Performed by: INTERNAL MEDICINE

## 2021-01-01 PROCEDURE — 83520 IMMUNOASSAY QUANT NOS NONAB: CPT | Performed by: INTERNAL MEDICINE

## 2021-01-01 PROCEDURE — XW033E5 INTRODUCTION OF REMDESIVIR ANTI-INFECTIVE INTO PERIPHERAL VEIN, PERCUTANEOUS APPROACH, NEW TECHNOLOGY GROUP 5: ICD-10-PCS | Performed by: INTERNAL MEDICINE

## 2021-01-01 PROCEDURE — 94660 CPAP INITIATION&MGMT: CPT

## 2021-01-01 PROCEDURE — 25010000002 PROPOFOL 1000 MG/100ML EMULSION

## 2021-01-01 PROCEDURE — 87040 BLOOD CULTURE FOR BACTERIA: CPT | Performed by: FAMILY MEDICINE

## 2021-01-01 PROCEDURE — 85379 FIBRIN DEGRADATION QUANT: CPT | Performed by: FAMILY MEDICINE

## 2021-01-01 PROCEDURE — 63710000001 INSULIN REGULAR HUMAN PER 5 UNITS: Performed by: NURSE PRACTITIONER

## 2021-01-01 PROCEDURE — 02HV33Z INSERTION OF INFUSION DEVICE INTO SUPERIOR VENA CAVA, PERCUTANEOUS APPROACH: ICD-10-PCS | Performed by: FAMILY MEDICINE

## 2021-01-01 PROCEDURE — 87641 MR-STAPH DNA AMP PROBE: CPT | Performed by: INTERNAL MEDICINE

## 2021-01-01 PROCEDURE — 25010000002 CALCIUM GLUCONATE PER 10 ML: Performed by: INTERNAL MEDICINE

## 2021-01-01 PROCEDURE — 93005 ELECTROCARDIOGRAM TRACING: CPT | Performed by: INTERNAL MEDICINE

## 2021-01-01 PROCEDURE — 25010000002 MEROPENEM PER 100 MG: Performed by: FAMILY MEDICINE

## 2021-01-01 PROCEDURE — 0 IOPAMIDOL PER 1 ML: Performed by: FAMILY MEDICINE

## 2021-01-01 PROCEDURE — 5A1955Z RESPIRATORY VENTILATION, GREATER THAN 96 CONSECUTIVE HOURS: ICD-10-PCS | Performed by: INTERNAL MEDICINE

## 2021-01-01 PROCEDURE — 25010000002 THIAMINE PER 100 MG: Performed by: INTERNAL MEDICINE

## 2021-01-01 PROCEDURE — 0BH17EZ INSERTION OF ENDOTRACHEAL AIRWAY INTO TRACHEA, VIA NATURAL OR ARTIFICIAL OPENING: ICD-10-PCS | Performed by: FAMILY MEDICINE

## 2021-01-01 PROCEDURE — 83605 ASSAY OF LACTIC ACID: CPT | Performed by: FAMILY MEDICINE

## 2021-01-01 PROCEDURE — XW033H5 INTRODUCTION OF TOCILIZUMAB INTO PERIPHERAL VEIN, PERCUTANEOUS APPROACH, NEW TECHNOLOGY GROUP 5: ICD-10-PCS | Performed by: NURSE PRACTITIONER

## 2021-01-01 PROCEDURE — 85007 BL SMEAR W/DIFF WBC COUNT: CPT | Performed by: INTERNAL MEDICINE

## 2021-01-01 PROCEDURE — 93005 ELECTROCARDIOGRAM TRACING: CPT | Performed by: STUDENT IN AN ORGANIZED HEALTH CARE EDUCATION/TRAINING PROGRAM

## 2021-01-01 PROCEDURE — 87205 SMEAR GRAM STAIN: CPT | Performed by: INTERNAL MEDICINE

## 2021-01-01 PROCEDURE — 87070 CULTURE OTHR SPECIMN AEROBIC: CPT | Performed by: INTERNAL MEDICINE

## 2021-01-01 PROCEDURE — 25010000002 EPINEPHRINE 1 MG/10ML SOLUTION PREFILLED SYRINGE

## 2021-01-01 PROCEDURE — 83880 ASSAY OF NATRIURETIC PEPTIDE: CPT | Performed by: STUDENT IN AN ORGANIZED HEALTH CARE EDUCATION/TRAINING PROGRAM

## 2021-01-01 PROCEDURE — 25010000002 FENTANYL 10 MCG/1 ML NS: Performed by: FAMILY MEDICINE

## 2021-01-01 PROCEDURE — 94640 AIRWAY INHALATION TREATMENT: CPT

## 2021-01-01 PROCEDURE — 36415 COLL VENOUS BLD VENIPUNCTURE: CPT

## 2021-01-01 PROCEDURE — 25010000002 FENTANYL CITRATE (PF) 50 MCG/ML SOLUTION: Performed by: INTERNAL MEDICINE

## 2021-01-01 PROCEDURE — 25010000002 VANCOMYCIN 1 G RECONSTITUTED SOLUTION 1 EACH VIAL: Performed by: INTERNAL MEDICINE

## 2021-01-01 PROCEDURE — 83050 HGB METHEMOGLOBIN QUAN: CPT

## 2021-01-01 PROCEDURE — 87633 RESP VIRUS 12-25 TARGETS: CPT | Performed by: INTERNAL MEDICINE

## 2021-01-01 PROCEDURE — 84132 ASSAY OF SERUM POTASSIUM: CPT | Performed by: INTERNAL MEDICINE

## 2021-01-01 PROCEDURE — 99232 SBSQ HOSP IP/OBS MODERATE 35: CPT | Performed by: INTERNAL MEDICINE

## 2021-01-01 PROCEDURE — 93970 EXTREMITY STUDY: CPT | Performed by: SURGERY

## 2021-01-01 PROCEDURE — 31500 INSERT EMERGENCY AIRWAY: CPT | Performed by: FAMILY MEDICINE

## 2021-01-01 PROCEDURE — 25010000002 LEVOFLOXACIN PER 250 MG: Performed by: INTERNAL MEDICINE

## 2021-01-01 PROCEDURE — 25010000002 ENOXAPARIN PER 10 MG: Performed by: INTERNAL MEDICINE

## 2021-01-01 PROCEDURE — C1751 CATH, INF, PER/CENT/MIDLINE: HCPCS

## 2021-01-01 RX ORDER — ATORVASTATIN CALCIUM 10 MG/1
10 TABLET, FILM COATED ORAL NIGHTLY
Status: DISCONTINUED | OUTPATIENT
Start: 2021-01-01 | End: 2021-01-01

## 2021-01-01 RX ORDER — SODIUM POLYSTYRENE SULFONATE 15 G/60ML
30 SUSPENSION ORAL; RECTAL ONCE
Status: COMPLETED | OUTPATIENT
Start: 2021-01-01 | End: 2021-01-01

## 2021-01-01 RX ORDER — DEXTROSE MONOHYDRATE 25 G/50ML
50 INJECTION, SOLUTION INTRAVENOUS
Status: DISCONTINUED | OUTPATIENT
Start: 2021-01-01 | End: 2021-01-01 | Stop reason: HOSPADM

## 2021-01-01 RX ORDER — FAMOTIDINE 20 MG/1
20 TABLET, FILM COATED ORAL 2 TIMES DAILY
Status: DISCONTINUED | OUTPATIENT
Start: 2021-01-01 | End: 2021-01-01

## 2021-01-01 RX ORDER — NOREPINEPHRINE BIT/0.9 % NACL 8 MG/250ML
.02-.3 INFUSION BOTTLE (ML) INTRAVENOUS
Status: DISCONTINUED | OUTPATIENT
Start: 2021-01-01 | End: 2021-01-01 | Stop reason: HOSPADM

## 2021-01-01 RX ORDER — FLUCONAZOLE 2 MG/ML
200 INJECTION, SOLUTION INTRAVENOUS ONCE
Status: COMPLETED | OUTPATIENT
Start: 2021-01-01 | End: 2021-01-01

## 2021-01-01 RX ORDER — ZINC SULFATE 50(220)MG
220 CAPSULE ORAL DAILY
Status: DISCONTINUED | OUTPATIENT
Start: 2021-01-01 | End: 2021-01-01 | Stop reason: HOSPADM

## 2021-01-01 RX ORDER — DEXTROSE MONOHYDRATE 25 G/50ML
50 INJECTION, SOLUTION INTRAVENOUS ONCE
Status: COMPLETED | OUTPATIENT
Start: 2021-01-01 | End: 2021-01-01

## 2021-01-01 RX ORDER — SODIUM CHLORIDE 9 MG/ML
60 INJECTION, SOLUTION INTRAVENOUS CONTINUOUS
Status: DISCONTINUED | OUTPATIENT
Start: 2021-01-01 | End: 2021-01-01

## 2021-01-01 RX ORDER — ACETAMINOPHEN 650 MG/1
650 SUPPOSITORY RECTAL EVERY 4 HOURS PRN
Status: DISCONTINUED | OUTPATIENT
Start: 2021-01-01 | End: 2021-01-01 | Stop reason: SDUPTHER

## 2021-01-01 RX ORDER — DEXAMETHASONE SODIUM PHOSPHATE 10 MG/ML
6 INJECTION, SOLUTION INTRAMUSCULAR; INTRAVENOUS ONCE
Status: COMPLETED | OUTPATIENT
Start: 2021-01-01 | End: 2021-01-01

## 2021-01-01 RX ORDER — PROPOFOL 10 MG/ML
INJECTION, EMULSION INTRAVENOUS
Status: COMPLETED
Start: 2021-01-01 | End: 2021-01-01

## 2021-01-01 RX ORDER — SODIUM CHLORIDE 0.9 % (FLUSH) 0.9 %
10 SYRINGE (ML) INJECTION AS NEEDED
Status: DISCONTINUED | OUTPATIENT
Start: 2021-01-01 | End: 2021-01-01

## 2021-01-01 RX ORDER — LIDOCAINE HYDROCHLORIDE 10 MG/ML
1 INJECTION, SOLUTION EPIDURAL; INFILTRATION; INTRACAUDAL; PERINEURAL ONCE
Status: COMPLETED | OUTPATIENT
Start: 2021-01-01 | End: 2021-01-01

## 2021-01-01 RX ORDER — FAMOTIDINE 20 MG/1
20 TABLET, FILM COATED ORAL DAILY
Status: DISCONTINUED | OUTPATIENT
Start: 2021-01-01 | End: 2021-01-01 | Stop reason: HOSPADM

## 2021-01-01 RX ORDER — MELATONIN
1000 DAILY
Status: DISCONTINUED | OUTPATIENT
Start: 2021-01-01 | End: 2021-01-01 | Stop reason: HOSPADM

## 2021-01-01 RX ORDER — FENOFIBRATE 48 MG/1
48 TABLET, COATED ORAL DAILY
Status: DISCONTINUED | OUTPATIENT
Start: 2021-01-01 | End: 2021-01-01 | Stop reason: HOSPADM

## 2021-01-01 RX ORDER — LEVOFLOXACIN 5 MG/ML
750 INJECTION, SOLUTION INTRAVENOUS
Status: DISCONTINUED | OUTPATIENT
Start: 2021-01-01 | End: 2021-01-01

## 2021-01-01 RX ORDER — POLYETHYLENE GLYCOL 3350 17 G/17G
17 POWDER, FOR SOLUTION ORAL DAILY
Status: DISCONTINUED | OUTPATIENT
Start: 2021-01-01 | End: 2021-01-01 | Stop reason: HOSPADM

## 2021-01-01 RX ORDER — MULTIPLE VITAMINS W/ MINERALS TAB 9MG-400MCG
1 TAB ORAL DAILY
Status: DISCONTINUED | OUTPATIENT
Start: 2021-01-01 | End: 2021-01-01 | Stop reason: HOSPADM

## 2021-01-01 RX ORDER — LOSARTAN POTASSIUM 50 MG/1
100 TABLET ORAL
Status: DISCONTINUED | OUTPATIENT
Start: 2021-01-01 | End: 2021-01-01

## 2021-01-01 RX ORDER — DEXAMETHASONE SODIUM PHOSPHATE 4 MG/ML
6 INJECTION, SOLUTION INTRA-ARTICULAR; INTRALESIONAL; INTRAMUSCULAR; INTRAVENOUS; SOFT TISSUE 2 TIMES DAILY
Status: DISCONTINUED | OUTPATIENT
Start: 2021-01-01 | End: 2021-01-01

## 2021-01-01 RX ORDER — ROCURONIUM BROMIDE 10 MG/ML
250 INJECTION, SOLUTION INTRAVENOUS ONCE AS NEEDED
Status: DISCONTINUED | OUTPATIENT
Start: 2021-01-01 | End: 2021-01-01 | Stop reason: HOSPADM

## 2021-01-01 RX ORDER — BENZONATATE 100 MG/1
200 CAPSULE ORAL EVERY 4 HOURS PRN
Status: DISCONTINUED | OUTPATIENT
Start: 2021-01-01 | End: 2021-01-01 | Stop reason: HOSPADM

## 2021-01-01 RX ORDER — ACETAMINOPHEN 325 MG/1
650 TABLET ORAL EVERY 4 HOURS PRN
Status: DISCONTINUED | OUTPATIENT
Start: 2021-01-01 | End: 2021-01-01 | Stop reason: HOSPADM

## 2021-01-01 RX ORDER — NICOTINE POLACRILEX 4 MG
15 LOZENGE BUCCAL
Status: DISCONTINUED | OUTPATIENT
Start: 2021-01-01 | End: 2021-01-01 | Stop reason: HOSPADM

## 2021-01-01 RX ORDER — ONDANSETRON 4 MG/1
4 TABLET, FILM COATED ORAL EVERY 6 HOURS PRN
Status: DISCONTINUED | OUTPATIENT
Start: 2021-01-01 | End: 2021-01-01 | Stop reason: HOSPADM

## 2021-01-01 RX ORDER — DEXTROMETHORPHAN POLISTIREX 30 MG/5ML
60 SUSPENSION ORAL EVERY 12 HOURS PRN
Status: DISCONTINUED | OUTPATIENT
Start: 2021-01-01 | End: 2021-01-01 | Stop reason: HOSPADM

## 2021-01-01 RX ORDER — SODIUM CHLORIDE 450 MG/100ML
75 INJECTION, SOLUTION INTRAVENOUS CONTINUOUS
Status: DISCONTINUED | OUTPATIENT
Start: 2021-01-01 | End: 2021-01-01

## 2021-01-01 RX ORDER — FENTANYL CITRATE 50 UG/ML
100 INJECTION, SOLUTION INTRAMUSCULAR; INTRAVENOUS ONCE
Status: COMPLETED | OUTPATIENT
Start: 2021-01-01 | End: 2021-01-01

## 2021-01-01 RX ORDER — DEXTROSE MONOHYDRATE 25 G/50ML
25 INJECTION, SOLUTION INTRAVENOUS
Status: DISCONTINUED | OUTPATIENT
Start: 2021-01-01 | End: 2021-01-01 | Stop reason: HOSPADM

## 2021-01-01 RX ORDER — BUDESONIDE AND FORMOTEROL FUMARATE DIHYDRATE 160; 4.5 UG/1; UG/1
2 AEROSOL RESPIRATORY (INHALATION)
Status: DISCONTINUED | OUTPATIENT
Start: 2021-01-01 | End: 2021-01-01 | Stop reason: HOSPADM

## 2021-01-01 RX ORDER — ACETAMINOPHEN 650 MG/1
650 SUPPOSITORY RECTAL EVERY 4 HOURS PRN
Status: DISCONTINUED | OUTPATIENT
Start: 2021-01-01 | End: 2021-01-01 | Stop reason: HOSPADM

## 2021-01-01 RX ORDER — IPRATROPIUM BROMIDE AND ALBUTEROL SULFATE 2.5; .5 MG/3ML; MG/3ML
3 SOLUTION RESPIRATORY (INHALATION)
Status: DISCONTINUED | OUTPATIENT
Start: 2021-01-01 | End: 2021-01-01 | Stop reason: HOSPADM

## 2021-01-01 RX ORDER — DEXAMETHASONE SODIUM PHOSPHATE 4 MG/ML
6 INJECTION, SOLUTION INTRA-ARTICULAR; INTRALESIONAL; INTRAMUSCULAR; INTRAVENOUS; SOFT TISSUE DAILY
Status: DISCONTINUED | OUTPATIENT
Start: 2021-01-01 | End: 2021-01-01

## 2021-01-01 RX ORDER — POLYETHYLENE GLYCOL 3350 17 G/17G
17 POWDER, FOR SOLUTION ORAL DAILY
Status: DISCONTINUED | OUTPATIENT
Start: 2021-01-01 | End: 2021-01-01

## 2021-01-01 RX ORDER — DEXAMETHASONE SODIUM PHOSPHATE 10 MG/ML
6 INJECTION INTRAMUSCULAR; INTRAVENOUS ONCE
Status: COMPLETED | OUTPATIENT
Start: 2021-01-01 | End: 2021-01-01

## 2021-01-01 RX ORDER — FENOFIBRATE 145 MG/1
145 TABLET, COATED ORAL DAILY
Status: DISCONTINUED | OUTPATIENT
Start: 2021-01-01 | End: 2021-01-01

## 2021-01-01 RX ORDER — OLMESARTAN MEDOXOMIL 20 MG/1
40 TABLET ORAL DAILY
COMMUNITY

## 2021-01-01 RX ORDER — ASCORBIC ACID 500 MG
500 TABLET ORAL DAILY
Status: DISCONTINUED | OUTPATIENT
Start: 2021-01-01 | End: 2021-01-01 | Stop reason: HOSPADM

## 2021-01-01 RX ORDER — ROCURONIUM BROMIDE 10 MG/ML
500 INJECTION, SOLUTION INTRAVENOUS ONCE
Status: COMPLETED | OUTPATIENT
Start: 2021-01-01 | End: 2021-01-01

## 2021-01-01 RX ORDER — SODIUM CHLORIDE 0.9 % (FLUSH) 0.9 %
10 SYRINGE (ML) INJECTION EVERY 12 HOURS SCHEDULED
Status: DISCONTINUED | OUTPATIENT
Start: 2021-01-01 | End: 2021-01-01

## 2021-01-01 RX ORDER — CALCIUM CHLORIDE 100 MG/ML
1 INJECTION INTRAVENOUS; INTRAVENTRICULAR ONCE
Status: COMPLETED | OUTPATIENT
Start: 2021-01-01 | End: 2021-01-01

## 2021-01-01 RX ORDER — ECHINACEA PURPUREA EXTRACT 125 MG
2 TABLET ORAL AS NEEDED
Status: DISCONTINUED | OUTPATIENT
Start: 2021-01-01 | End: 2021-01-01 | Stop reason: HOSPADM

## 2021-01-01 RX ORDER — DOXYCYCLINE 100 MG/1
100 TABLET ORAL EVERY 12 HOURS SCHEDULED
Status: DISCONTINUED | OUTPATIENT
Start: 2021-01-01 | End: 2021-01-01

## 2021-01-01 RX ORDER — LANOLIN ALCOHOL/MO/W.PET/CERES
3 CREAM (GRAM) TOPICAL NIGHTLY
Status: DISCONTINUED | OUTPATIENT
Start: 2021-01-01 | End: 2021-01-01 | Stop reason: HOSPADM

## 2021-01-01 RX ORDER — SODIUM CHLORIDE 9 MG/ML
75 INJECTION, SOLUTION INTRAVENOUS CONTINUOUS
Status: DISCONTINUED | OUTPATIENT
Start: 2021-01-01 | End: 2021-01-01

## 2021-01-01 RX ORDER — ALBUTEROL SULFATE 90 UG/1
2 AEROSOL, METERED RESPIRATORY (INHALATION)
Status: DISCONTINUED | OUTPATIENT
Start: 2021-01-01 | End: 2021-01-01

## 2021-01-01 RX ORDER — WHEY PROTEIN ISOLATE 6 G-25/7 G
3 POWDER (GRAM) ORAL 3 TIMES DAILY
Status: DISCONTINUED | OUTPATIENT
Start: 2021-01-01 | End: 2021-01-01 | Stop reason: HOSPADM

## 2021-01-01 RX ORDER — DEXAMETHASONE SODIUM PHOSPHATE 4 MG/ML
6 INJECTION, SOLUTION INTRA-ARTICULAR; INTRALESIONAL; INTRAMUSCULAR; INTRAVENOUS; SOFT TISSUE DAILY
Status: DISCONTINUED | OUTPATIENT
Start: 2021-01-01 | End: 2021-01-01 | Stop reason: HOSPADM

## 2021-01-01 RX ORDER — ONDANSETRON 2 MG/ML
4 INJECTION INTRAMUSCULAR; INTRAVENOUS EVERY 6 HOURS PRN
Status: DISCONTINUED | OUTPATIENT
Start: 2021-01-01 | End: 2021-01-01 | Stop reason: HOSPADM

## 2021-01-01 RX ORDER — ACETAMINOPHEN 325 MG/1
650 TABLET ORAL EVERY 4 HOURS PRN
Status: DISCONTINUED | OUTPATIENT
Start: 2021-01-01 | End: 2021-01-01 | Stop reason: SDUPTHER

## 2021-01-01 RX ORDER — ROCURONIUM BROMIDE 10 MG/ML
50 INJECTION, SOLUTION INTRAVENOUS ONCE
Status: DISCONTINUED | OUTPATIENT
Start: 2021-01-01 | End: 2021-01-01

## 2021-01-01 RX ADMIN — ALBUTEROL SULFATE 2 PUFF: 90 AEROSOL, METERED RESPIRATORY (INHALATION) at 14:50

## 2021-01-01 RX ADMIN — INSULIN HUMAN 10 UNITS: 100 INJECTION, SOLUTION PARENTERAL at 01:49

## 2021-01-01 RX ADMIN — ALBUTEROL SULFATE 2 PUFF: 90 AEROSOL, METERED RESPIRATORY (INHALATION) at 18:53

## 2021-01-01 RX ADMIN — GUAIFENESIN 200 MG: 100 SOLUTION ORAL at 09:06

## 2021-01-01 RX ADMIN — DEXAMETHASONE SODIUM PHOSPHATE 6 MG: 4 INJECTION, SOLUTION INTRA-ARTICULAR; INTRALESIONAL; INTRAMUSCULAR; INTRAVENOUS; SOFT TISSUE at 08:12

## 2021-01-01 RX ADMIN — ATORVASTATIN CALCIUM 10 MG: 10 TABLET, FILM COATED ORAL at 20:10

## 2021-01-01 RX ADMIN — BUDESONIDE AND FORMOTEROL FUMARATE DIHYDRATE 2 PUFF: 160; 4.5 AEROSOL RESPIRATORY (INHALATION) at 07:20

## 2021-01-01 RX ADMIN — SODIUM ZIRCONIUM CYCLOSILICATE 10 G: 10 POWDER, FOR SUSPENSION ORAL at 08:52

## 2021-01-01 RX ADMIN — CEFTRIAXONE SODIUM 1 G: 1 INJECTION, POWDER, FOR SOLUTION INTRAMUSCULAR; INTRAVENOUS at 01:26

## 2021-01-01 RX ADMIN — TAZOBACTAM SODIUM AND PIPERACILLIN SODIUM 4.5 G: 500; 4 INJECTION, SOLUTION INTRAVENOUS at 23:22

## 2021-01-01 RX ADMIN — TAZOBACTAM SODIUM AND PIPERACILLIN SODIUM 4.5 G: 500; 4 INJECTION, SOLUTION INTRAVENOUS at 00:23

## 2021-01-01 RX ADMIN — Medication 1000 UNITS: at 09:47

## 2021-01-01 RX ADMIN — ALBUTEROL SULFATE 2 PUFF: 90 AEROSOL, METERED RESPIRATORY (INHALATION) at 14:42

## 2021-01-01 RX ADMIN — PROPOFOL 70 MCG/KG/MIN: 10 INJECTION, EMULSION INTRAVENOUS at 02:02

## 2021-01-01 RX ADMIN — INSULIN LISPRO 2 UNITS: 100 INJECTION, SOLUTION INTRAVENOUS; SUBCUTANEOUS at 18:01

## 2021-01-01 RX ADMIN — ALBUTEROL SULFATE 2 PUFF: 90 AEROSOL, METERED RESPIRATORY (INHALATION) at 15:44

## 2021-01-01 RX ADMIN — LOSARTAN POTASSIUM 100 MG: 50 TABLET, FILM COATED ORAL at 08:25

## 2021-01-01 RX ADMIN — INSULIN LISPRO 2 UNITS: 100 INJECTION, SOLUTION INTRAVENOUS; SUBCUTANEOUS at 16:41

## 2021-01-01 RX ADMIN — GUAIFENESIN 200 MG: 100 SOLUTION ORAL at 16:03

## 2021-01-01 RX ADMIN — GUAIFENESIN 200 MG: 100 SOLUTION ORAL at 20:10

## 2021-01-01 RX ADMIN — SODIUM ZIRCONIUM CYCLOSILICATE 10 G: 10 POWDER, FOR SUSPENSION ORAL at 16:09

## 2021-01-01 RX ADMIN — Medication 3 MG: at 21:03

## 2021-01-01 RX ADMIN — DEXTROSE MONOHYDRATE 50 ML: 500 INJECTION PARENTERAL at 10:32

## 2021-01-01 RX ADMIN — FAMOTIDINE 20 MG: 20 TABLET, FILM COATED ORAL at 12:07

## 2021-01-01 RX ADMIN — ALBUTEROL SULFATE 2 PUFF: 90 AEROSOL, METERED RESPIRATORY (INHALATION) at 07:06

## 2021-01-01 RX ADMIN — GUAIFENESIN 200 MG: 100 SOLUTION ORAL at 08:51

## 2021-01-01 RX ADMIN — PROPOFOL 60 MCG/KG/MIN: 10 INJECTION, EMULSION INTRAVENOUS at 16:40

## 2021-01-01 RX ADMIN — MEROPENEM 1 G: 1 INJECTION, POWDER, FOR SOLUTION INTRAVENOUS at 10:32

## 2021-01-01 RX ADMIN — Medication 0.06 MCG/KG/MIN: at 08:37

## 2021-01-01 RX ADMIN — TAZOBACTAM SODIUM AND PIPERACILLIN SODIUM 4.5 G: 500; 4 INJECTION, SOLUTION INTRAVENOUS at 05:09

## 2021-01-01 RX ADMIN — DEXAMETHASONE 6 MG: 4 TABLET ORAL at 20:03

## 2021-01-01 RX ADMIN — GUAIFENESIN 200 MG: 100 SOLUTION ORAL at 16:45

## 2021-01-01 RX ADMIN — BUDESONIDE AND FORMOTEROL FUMARATE DIHYDRATE 2 PUFF: 160; 4.5 AEROSOL RESPIRATORY (INHALATION) at 19:21

## 2021-01-01 RX ADMIN — SODIUM CHLORIDE 60 ML/HR: 9 INJECTION, SOLUTION INTRAVENOUS at 18:27

## 2021-01-01 RX ADMIN — PROPOFOL 65 MCG/KG/MIN: 10 INJECTION, EMULSION INTRAVENOUS at 04:47

## 2021-01-01 RX ADMIN — MINERAL OIL AND WHITE PETROLATUM: 150; 830 OINTMENT OPHTHALMIC at 23:56

## 2021-01-01 RX ADMIN — BUDESONIDE AND FORMOTEROL FUMARATE DIHYDRATE 2 PUFF: 160; 4.5 AEROSOL RESPIRATORY (INHALATION) at 08:30

## 2021-01-01 RX ADMIN — FAMOTIDINE 20 MG: 20 TABLET, FILM COATED ORAL at 08:11

## 2021-01-01 RX ADMIN — ATORVASTATIN CALCIUM 10 MG: 10 TABLET, FILM COATED ORAL at 20:07

## 2021-01-01 RX ADMIN — DOXYCYCLINE 100 MG: 100 INJECTION, POWDER, LYOPHILIZED, FOR SOLUTION INTRAVENOUS at 02:45

## 2021-01-01 RX ADMIN — SODIUM BICARBONATE: 84 INJECTION INTRAVENOUS at 14:34

## 2021-01-01 RX ADMIN — ROCURONIUM BROMIDE 8 MCG/KG/MIN: 50 INJECTION INTRAVENOUS at 23:43

## 2021-01-01 RX ADMIN — INSULIN LISPRO 4 UNITS: 100 INJECTION, SOLUTION INTRAVENOUS; SUBCUTANEOUS at 11:50

## 2021-01-01 RX ADMIN — ATORVASTATIN CALCIUM 10 MG: 10 TABLET, FILM COATED ORAL at 21:02

## 2021-01-01 RX ADMIN — ALBUTEROL SULFATE 2 PUFF: 90 AEROSOL, METERED RESPIRATORY (INHALATION) at 06:40

## 2021-01-01 RX ADMIN — GUAIFENESIN 200 MG: 100 SOLUTION ORAL at 08:26

## 2021-01-01 RX ADMIN — FENTANYL CITRATE 50 MCG/HR: 50 INJECTION, SOLUTION INTRAMUSCULAR; INTRAVENOUS at 18:32

## 2021-01-01 RX ADMIN — Medication 1000 UNITS: at 08:06

## 2021-01-01 RX ADMIN — POLYETHYLENE GLYCOL 3350 17 G: 17 POWDER, FOR SOLUTION ORAL at 09:47

## 2021-01-01 RX ADMIN — ENOXAPARIN SODIUM 90 MG: 100 INJECTION SUBCUTANEOUS at 08:02

## 2021-01-01 RX ADMIN — Medication 3 MG: at 20:32

## 2021-01-01 RX ADMIN — Medication 3 PACKET: at 16:52

## 2021-01-01 RX ADMIN — INSULIN LISPRO 4 UNITS: 100 INJECTION, SOLUTION INTRAVENOUS; SUBCUTANEOUS at 06:55

## 2021-01-01 RX ADMIN — ALBUTEROL SULFATE 2 PUFF: 90 AEROSOL, METERED RESPIRATORY (INHALATION) at 10:16

## 2021-01-01 RX ADMIN — Medication 3 PACKET: at 15:09

## 2021-01-01 RX ADMIN — INSULIN LISPRO 2 UNITS: 100 INJECTION, SOLUTION INTRAVENOUS; SUBCUTANEOUS at 23:48

## 2021-01-01 RX ADMIN — PROPOFOL 60 MCG/KG/MIN: 10 INJECTION, EMULSION INTRAVENOUS at 15:04

## 2021-01-01 RX ADMIN — MINERAL OIL AND WHITE PETROLATUM: 150; 830 OINTMENT OPHTHALMIC at 23:29

## 2021-01-01 RX ADMIN — ALBUTEROL SULFATE 2 PUFF: 90 AEROSOL, METERED RESPIRATORY (INHALATION) at 20:19

## 2021-01-01 RX ADMIN — FENTANYL CITRATE 250 MCG/HR: 50 INJECTION, SOLUTION INTRAMUSCULAR; INTRAVENOUS at 01:38

## 2021-01-01 RX ADMIN — PROPOFOL 75 MCG/KG/MIN: 10 INJECTION, EMULSION INTRAVENOUS at 05:20

## 2021-01-01 RX ADMIN — INSULIN LISPRO 4 UNITS: 100 INJECTION, SOLUTION INTRAVENOUS; SUBCUTANEOUS at 12:09

## 2021-01-01 RX ADMIN — ALBUTEROL SULFATE 2 PUFF: 90 AEROSOL, METERED RESPIRATORY (INHALATION) at 23:04

## 2021-01-01 RX ADMIN — OXYCODONE HYDROCHLORIDE AND ACETAMINOPHEN 500 MG: 500 TABLET ORAL at 08:14

## 2021-01-01 RX ADMIN — ALBUTEROL SULFATE 2 PUFF: 90 AEROSOL, METERED RESPIRATORY (INHALATION) at 19:26

## 2021-01-01 RX ADMIN — THIAMINE HCL TAB 100 MG 100 MG: 100 TAB at 08:47

## 2021-01-01 RX ADMIN — ACETAMINOPHEN 650 MG: 325 TABLET, FILM COATED ORAL at 17:18

## 2021-01-01 RX ADMIN — VANCOMYCIN HYDROCHLORIDE 1000 MG: 1 INJECTION, POWDER, LYOPHILIZED, FOR SOLUTION INTRAVENOUS at 11:25

## 2021-01-01 RX ADMIN — PROPOFOL 60 MCG/KG/MIN: 10 INJECTION, EMULSION INTRAVENOUS at 21:14

## 2021-01-01 RX ADMIN — ALBUTEROL SULFATE 2 PUFF: 90 AEROSOL, METERED RESPIRATORY (INHALATION) at 23:17

## 2021-01-01 RX ADMIN — PROPOFOL 60 MCG/KG/MIN: 10 INJECTION, EMULSION INTRAVENOUS at 10:50

## 2021-01-01 RX ADMIN — DOXYCYCLINE 100 MG: 100 INJECTION, POWDER, LYOPHILIZED, FOR SOLUTION INTRAVENOUS at 01:25

## 2021-01-01 RX ADMIN — INSULIN LISPRO 2 UNITS: 100 INJECTION, SOLUTION INTRAVENOUS; SUBCUTANEOUS at 17:06

## 2021-01-01 RX ADMIN — MINERAL OIL AND WHITE PETROLATUM: 150; 830 OINTMENT OPHTHALMIC at 03:01

## 2021-01-01 RX ADMIN — ENOXAPARIN SODIUM 90 MG: 100 INJECTION SUBCUTANEOUS at 08:51

## 2021-01-01 RX ADMIN — DOXYCYCLINE 100 MG: 100 INJECTION, POWDER, LYOPHILIZED, FOR SOLUTION INTRAVENOUS at 15:03

## 2021-01-01 RX ADMIN — ALBUTEROL SULFATE 2 PUFF: 90 AEROSOL, METERED RESPIRATORY (INHALATION) at 22:16

## 2021-01-01 RX ADMIN — THIAMINE HYDROCHLORIDE 100 MG: 100 INJECTION, SOLUTION INTRAMUSCULAR; INTRAVENOUS at 09:06

## 2021-01-01 RX ADMIN — MEROPENEM 1 G: 1 INJECTION, POWDER, FOR SOLUTION INTRAVENOUS at 00:02

## 2021-01-01 RX ADMIN — ALBUTEROL SULFATE 2 PUFF: 90 AEROSOL, METERED RESPIRATORY (INHALATION) at 10:32

## 2021-01-01 RX ADMIN — OXYCODONE HYDROCHLORIDE AND ACETAMINOPHEN 500 MG: 500 TABLET ORAL at 08:02

## 2021-01-01 RX ADMIN — DEXAMETHASONE SODIUM PHOSPHATE 6 MG: 10 INJECTION INTRAMUSCULAR; INTRAVENOUS at 20:38

## 2021-01-01 RX ADMIN — PROPOFOL 45 MCG/KG/MIN: 10 INJECTION, EMULSION INTRAVENOUS at 12:19

## 2021-01-01 RX ADMIN — SODIUM CHLORIDE, PRESERVATIVE FREE 10 ML: 5 INJECTION INTRAVENOUS at 09:22

## 2021-01-01 RX ADMIN — SODIUM ZIRCONIUM CYCLOSILICATE 10 G: 10 POWDER, FOR SUSPENSION ORAL at 08:03

## 2021-01-01 RX ADMIN — ALBUTEROL SULFATE 2 PUFF: 90 AEROSOL, METERED RESPIRATORY (INHALATION) at 06:56

## 2021-01-01 RX ADMIN — MINERAL OIL AND WHITE PETROLATUM: 150; 830 OINTMENT OPHTHALMIC at 05:00

## 2021-01-01 RX ADMIN — ALBUTEROL SULFATE 2 PUFF: 90 AEROSOL, METERED RESPIRATORY (INHALATION) at 14:45

## 2021-01-01 RX ADMIN — BENZONATATE 200 MG: 100 CAPSULE ORAL at 06:28

## 2021-01-01 RX ADMIN — CALCIUM GLUCONATE 1 G: 98 INJECTION, SOLUTION INTRAVENOUS at 01:49

## 2021-01-01 RX ADMIN — Medication 3 PACKET: at 16:14

## 2021-01-01 RX ADMIN — ALBUTEROL SULFATE 2 PUFF: 90 AEROSOL, METERED RESPIRATORY (INHALATION) at 04:35

## 2021-01-01 RX ADMIN — MINERAL OIL AND WHITE PETROLATUM: 150; 830 OINTMENT OPHTHALMIC at 03:20

## 2021-01-01 RX ADMIN — MINERAL OIL AND WHITE PETROLATUM: 150; 830 OINTMENT OPHTHALMIC at 10:19

## 2021-01-01 RX ADMIN — THIAMINE HCL TAB 100 MG 100 MG: 100 TAB at 08:07

## 2021-01-01 RX ADMIN — FENTANYL CITRATE 50 MCG/HR: 50 INJECTION, SOLUTION INTRAMUSCULAR; INTRAVENOUS at 14:00

## 2021-01-01 RX ADMIN — BUDESONIDE AND FORMOTEROL FUMARATE DIHYDRATE 2 PUFF: 160; 4.5 AEROSOL RESPIRATORY (INHALATION) at 07:28

## 2021-01-01 RX ADMIN — OXYCODONE HYDROCHLORIDE AND ACETAMINOPHEN 500 MG: 500 TABLET ORAL at 08:03

## 2021-01-01 RX ADMIN — DEXTROSE MONOHYDRATE 50 ML: 500 INJECTION PARENTERAL at 12:35

## 2021-01-01 RX ADMIN — PROPOFOL 60 MCG/KG/MIN: 10 INJECTION, EMULSION INTRAVENOUS at 21:10

## 2021-01-01 RX ADMIN — Medication 3 PACKET: at 08:00

## 2021-01-01 RX ADMIN — FAMOTIDINE 20 MG: 20 TABLET, FILM COATED ORAL at 08:03

## 2021-01-01 RX ADMIN — INSULIN LISPRO 2 UNITS: 100 INJECTION, SOLUTION INTRAVENOUS; SUBCUTANEOUS at 23:37

## 2021-01-01 RX ADMIN — ENOXAPARIN SODIUM 90 MG: 100 INJECTION SUBCUTANEOUS at 21:01

## 2021-01-01 RX ADMIN — ENOXAPARIN SODIUM 90 MG: 100 INJECTION SUBCUTANEOUS at 20:00

## 2021-01-01 RX ADMIN — GUAIFENESIN 200 MG: 100 SOLUTION ORAL at 20:08

## 2021-01-01 RX ADMIN — BUDESONIDE AND FORMOTEROL FUMARATE DIHYDRATE 2 PUFF: 160; 4.5 AEROSOL RESPIRATORY (INHALATION) at 20:15

## 2021-01-01 RX ADMIN — INSULIN LISPRO 2 UNITS: 100 INJECTION, SOLUTION INTRAVENOUS; SUBCUTANEOUS at 18:27

## 2021-01-01 RX ADMIN — GUAIFENESIN 200 MG: 100 SOLUTION ORAL at 08:04

## 2021-01-01 RX ADMIN — ALBUTEROL SULFATE 2 PUFF: 90 AEROSOL, METERED RESPIRATORY (INHALATION) at 15:19

## 2021-01-01 RX ADMIN — Medication 1000 UNITS: at 08:15

## 2021-01-01 RX ADMIN — FENTANYL CITRATE 100 MCG/HR: 50 INJECTION, SOLUTION INTRAMUSCULAR; INTRAVENOUS at 18:00

## 2021-01-01 RX ADMIN — TOCILIZUMAB 760 MG: 20 INJECTION, SOLUTION, CONCENTRATE INTRAVENOUS at 12:07

## 2021-01-01 RX ADMIN — PROPOFOL 70 MCG/KG/MIN: 10 INJECTION, EMULSION INTRAVENOUS at 00:30

## 2021-01-01 RX ADMIN — FAMOTIDINE 20 MG: 20 TABLET, FILM COATED ORAL at 08:08

## 2021-01-01 RX ADMIN — ENOXAPARIN SODIUM 90 MG: 100 INJECTION SUBCUTANEOUS at 08:13

## 2021-01-01 RX ADMIN — ATORVASTATIN CALCIUM 10 MG: 10 TABLET, FILM COATED ORAL at 20:02

## 2021-01-01 RX ADMIN — INSULIN LISPRO 2 UNITS: 100 INJECTION, SOLUTION INTRAVENOUS; SUBCUTANEOUS at 12:39

## 2021-01-01 RX ADMIN — OXYCODONE HYDROCHLORIDE AND ACETAMINOPHEN 500 MG: 500 TABLET ORAL at 08:18

## 2021-01-01 RX ADMIN — PROPOFOL 70 MCG/KG/MIN: 10 INJECTION, EMULSION INTRAVENOUS at 20:03

## 2021-01-01 RX ADMIN — BENZONATATE 200 MG: 100 CAPSULE ORAL at 13:53

## 2021-01-01 RX ADMIN — BUDESONIDE AND FORMOTEROL FUMARATE DIHYDRATE 2 PUFF: 160; 4.5 AEROSOL RESPIRATORY (INHALATION) at 06:54

## 2021-01-01 RX ADMIN — ENOXAPARIN SODIUM 90 MG: 100 INJECTION SUBCUTANEOUS at 20:50

## 2021-01-01 RX ADMIN — APIXABAN 5 MG: 5 TABLET, FILM COATED ORAL at 21:52

## 2021-01-01 RX ADMIN — GUAIFENESIN 200 MG: 100 SOLUTION ORAL at 08:15

## 2021-01-01 RX ADMIN — ZINC SULFATE 220 MG (50 MG) CAPSULE 220 MG: CAPSULE at 08:40

## 2021-01-01 RX ADMIN — Medication 1 TABLET: at 08:06

## 2021-01-01 RX ADMIN — ALBUTEROL SULFATE 2 PUFF: 90 AEROSOL, METERED RESPIRATORY (INHALATION) at 14:58

## 2021-01-01 RX ADMIN — INSULIN LISPRO 4 UNITS: 100 INJECTION, SOLUTION INTRAVENOUS; SUBCUTANEOUS at 06:11

## 2021-01-01 RX ADMIN — BUDESONIDE AND FORMOTEROL FUMARATE DIHYDRATE 2 PUFF: 160; 4.5 AEROSOL RESPIRATORY (INHALATION) at 18:40

## 2021-01-01 RX ADMIN — MINERAL OIL AND WHITE PETROLATUM: 150; 830 OINTMENT OPHTHALMIC at 02:37

## 2021-01-01 RX ADMIN — Medication 3 MG: at 20:10

## 2021-01-01 RX ADMIN — FENOFIBRATE 48 MG: 48 TABLET ORAL at 08:01

## 2021-01-01 RX ADMIN — DEXTROMETHORPHAN 60 MG: 30 SUSPENSION, EXTENDED RELEASE ORAL at 08:28

## 2021-01-01 RX ADMIN — INSULIN LISPRO 2 UNITS: 100 INJECTION, SOLUTION INTRAVENOUS; SUBCUTANEOUS at 23:39

## 2021-01-01 RX ADMIN — ATORVASTATIN CALCIUM 10 MG: 10 TABLET, FILM COATED ORAL at 20:43

## 2021-01-01 RX ADMIN — ALBUTEROL SULFATE 2 PUFF: 90 AEROSOL, METERED RESPIRATORY (INHALATION) at 13:53

## 2021-01-01 RX ADMIN — ALBUTEROL SULFATE 2 PUFF: 90 AEROSOL, METERED RESPIRATORY (INHALATION) at 15:37

## 2021-01-01 RX ADMIN — ALBUTEROL SULFATE 2 PUFF: 90 AEROSOL, METERED RESPIRATORY (INHALATION) at 07:18

## 2021-01-01 RX ADMIN — SODIUM CHLORIDE 75 ML/HR: 4.5 INJECTION, SOLUTION INTRAVENOUS at 20:07

## 2021-01-01 RX ADMIN — MINERAL OIL AND WHITE PETROLATUM: 150; 830 OINTMENT OPHTHALMIC at 16:03

## 2021-01-01 RX ADMIN — PROPOFOL 60 MCG/KG/MIN: 10 INJECTION, EMULSION INTRAVENOUS at 20:07

## 2021-01-01 RX ADMIN — Medication 1 TABLET: at 08:07

## 2021-01-01 RX ADMIN — FENTANYL CITRATE 200 MCG/HR: 50 INJECTION, SOLUTION INTRAMUSCULAR; INTRAVENOUS at 09:48

## 2021-01-01 RX ADMIN — GUAIFENESIN 200 MG: 100 SOLUTION ORAL at 20:56

## 2021-01-01 RX ADMIN — ALBUTEROL SULFATE 2 PUFF: 90 AEROSOL, METERED RESPIRATORY (INHALATION) at 10:34

## 2021-01-01 RX ADMIN — CEFTRIAXONE SODIUM 1 G: 1 INJECTION, POWDER, FOR SOLUTION INTRAMUSCULAR; INTRAVENOUS at 01:02

## 2021-01-01 RX ADMIN — ATORVASTATIN CALCIUM 10 MG: 10 TABLET, FILM COATED ORAL at 20:08

## 2021-01-01 RX ADMIN — APIXABAN 5 MG: 5 TABLET, FILM COATED ORAL at 08:41

## 2021-01-01 RX ADMIN — DEXAMETHASONE SODIUM PHOSPHATE 6 MG: 4 INJECTION, SOLUTION INTRA-ARTICULAR; INTRALESIONAL; INTRAMUSCULAR; INTRAVENOUS; SOFT TISSUE at 08:48

## 2021-01-01 RX ADMIN — CEFTRIAXONE SODIUM 1 G: 1 INJECTION, POWDER, FOR SOLUTION INTRAMUSCULAR; INTRAVENOUS at 02:45

## 2021-01-01 RX ADMIN — FENOFIBRATE 145 MG: 145 TABLET ORAL at 08:41

## 2021-01-01 RX ADMIN — DEXAMETHASONE 6 MG: 4 TABLET ORAL at 20:43

## 2021-01-01 RX ADMIN — ZINC SULFATE 220 MG (50 MG) CAPSULE 220 MG: CAPSULE at 08:06

## 2021-01-01 RX ADMIN — PROPOFOL 60 MCG/KG/MIN: 10 INJECTION, EMULSION INTRAVENOUS at 21:47

## 2021-01-01 RX ADMIN — ALBUTEROL SULFATE 2 PUFF: 90 AEROSOL, METERED RESPIRATORY (INHALATION) at 03:28

## 2021-01-01 RX ADMIN — GUAIFENESIN 200 MG: 100 SOLUTION ORAL at 20:29

## 2021-01-01 RX ADMIN — OXYCODONE HYDROCHLORIDE AND ACETAMINOPHEN 500 MG: 500 TABLET ORAL at 08:52

## 2021-01-01 RX ADMIN — PROPOFOL 70 MCG/KG/MIN: 10 INJECTION, EMULSION INTRAVENOUS at 14:26

## 2021-01-01 RX ADMIN — PROPOFOL 65 MCG/KG/MIN: 10 INJECTION, EMULSION INTRAVENOUS at 03:31

## 2021-01-01 RX ADMIN — INSULIN LISPRO 2 UNITS: 100 INJECTION, SOLUTION INTRAVENOUS; SUBCUTANEOUS at 06:33

## 2021-01-01 RX ADMIN — BUDESONIDE AND FORMOTEROL FUMARATE DIHYDRATE 2 PUFF: 160; 4.5 AEROSOL RESPIRATORY (INHALATION) at 06:40

## 2021-01-01 RX ADMIN — PROPOFOL 60 MCG/KG/MIN: 10 INJECTION, EMULSION INTRAVENOUS at 06:27

## 2021-01-01 RX ADMIN — INSULIN LISPRO 2 UNITS: 100 INJECTION, SOLUTION INTRAVENOUS; SUBCUTANEOUS at 17:38

## 2021-01-01 RX ADMIN — PROPOFOL 30 MCG/KG/MIN: 10 INJECTION, EMULSION INTRAVENOUS at 01:10

## 2021-01-01 RX ADMIN — PROPOFOL 60 MCG/KG/MIN: 10 INJECTION, EMULSION INTRAVENOUS at 17:58

## 2021-01-01 RX ADMIN — ALBUTEROL SULFATE 2 PUFF: 90 AEROSOL, METERED RESPIRATORY (INHALATION) at 11:45

## 2021-01-01 RX ADMIN — SODIUM CHLORIDE, PRESERVATIVE FREE 10 ML: 5 INJECTION INTRAVENOUS at 20:57

## 2021-01-01 RX ADMIN — BUDESONIDE AND FORMOTEROL FUMARATE DIHYDRATE 2 PUFF: 160; 4.5 AEROSOL RESPIRATORY (INHALATION) at 07:23

## 2021-01-01 RX ADMIN — ALBUTEROL SULFATE 2 PUFF: 90 AEROSOL, METERED RESPIRATORY (INHALATION) at 23:37

## 2021-01-01 RX ADMIN — ALBUTEROL SULFATE 2 PUFF: 90 AEROSOL, METERED RESPIRATORY (INHALATION) at 19:22

## 2021-01-01 RX ADMIN — TAZOBACTAM SODIUM AND PIPERACILLIN SODIUM 3.38 G: 375; 3 INJECTION, SOLUTION INTRAVENOUS at 11:37

## 2021-01-01 RX ADMIN — ATORVASTATIN CALCIUM 10 MG: 10 TABLET, FILM COATED ORAL at 20:32

## 2021-01-01 RX ADMIN — ENOXAPARIN SODIUM 90 MG: 100 INJECTION SUBCUTANEOUS at 09:11

## 2021-01-01 RX ADMIN — OXYCODONE HYDROCHLORIDE AND ACETAMINOPHEN 500 MG: 500 TABLET ORAL at 08:12

## 2021-01-01 RX ADMIN — INSULIN LISPRO 2 UNITS: 100 INJECTION, SOLUTION INTRAVENOUS; SUBCUTANEOUS at 05:18

## 2021-01-01 RX ADMIN — MINERAL OIL AND WHITE PETROLATUM: 150; 830 OINTMENT OPHTHALMIC at 15:09

## 2021-01-01 RX ADMIN — MINERAL OIL AND WHITE PETROLATUM: 150; 830 OINTMENT OPHTHALMIC at 03:36

## 2021-01-01 RX ADMIN — ROCURONIUM BROMIDE 8 MCG/KG/MIN: 50 INJECTION INTRAVENOUS at 18:11

## 2021-01-01 RX ADMIN — GUAIFENESIN 200 MG: 100 SOLUTION ORAL at 16:37

## 2021-01-01 RX ADMIN — ALBUTEROL SULFATE 2 PUFF: 90 AEROSOL, METERED RESPIRATORY (INHALATION) at 10:54

## 2021-01-01 RX ADMIN — BENZONATATE 200 MG: 100 CAPSULE ORAL at 08:07

## 2021-01-01 RX ADMIN — ATORVASTATIN CALCIUM 10 MG: 10 TABLET, FILM COATED ORAL at 20:04

## 2021-01-01 RX ADMIN — MINERAL OIL AND WHITE PETROLATUM: 150; 830 OINTMENT OPHTHALMIC at 12:27

## 2021-01-01 RX ADMIN — GUAIFENESIN 200 MG: 100 SOLUTION ORAL at 20:02

## 2021-01-01 RX ADMIN — GUAIFENESIN 200 MG: 100 SOLUTION ORAL at 20:03

## 2021-01-01 RX ADMIN — PROPOFOL 70 MCG/KG/MIN: 10 INJECTION, EMULSION INTRAVENOUS at 07:39

## 2021-01-01 RX ADMIN — ALBUTEROL SULFATE 2 PUFF: 90 AEROSOL, METERED RESPIRATORY (INHALATION) at 18:39

## 2021-01-01 RX ADMIN — TAZOBACTAM SODIUM AND PIPERACILLIN SODIUM 3.38 G: 375; 3 INJECTION, SOLUTION INTRAVENOUS at 12:25

## 2021-01-01 RX ADMIN — FAMOTIDINE 20 MG: 20 TABLET, FILM COATED ORAL at 09:47

## 2021-01-01 RX ADMIN — GUAIFENESIN 200 MG: 100 SOLUTION ORAL at 08:02

## 2021-01-01 RX ADMIN — MINERAL OIL AND WHITE PETROLATUM: 150; 830 OINTMENT OPHTHALMIC at 22:31

## 2021-01-01 RX ADMIN — ALBUTEROL SULFATE 2 PUFF: 90 AEROSOL, METERED RESPIRATORY (INHALATION) at 02:53

## 2021-01-01 RX ADMIN — CALCIUM GLUCONATE 1 G: 98 INJECTION, SOLUTION INTRAVENOUS at 22:59

## 2021-01-01 RX ADMIN — ALBUTEROL SULFATE 2 PUFF: 90 AEROSOL, METERED RESPIRATORY (INHALATION) at 10:38

## 2021-01-01 RX ADMIN — LOSARTAN POTASSIUM 100 MG: 50 TABLET, FILM COATED ORAL at 08:14

## 2021-01-01 RX ADMIN — Medication 0.1 MCG/KG/MIN: at 18:03

## 2021-01-01 RX ADMIN — PROPOFOL 65 MCG/KG/MIN: 10 INJECTION, EMULSION INTRAVENOUS at 10:26

## 2021-01-01 RX ADMIN — Medication 0.3 MCG/KG/MIN: at 10:48

## 2021-01-01 RX ADMIN — PROPOFOL 50 MCG/KG/MIN: 10 INJECTION, EMULSION INTRAVENOUS at 21:57

## 2021-01-01 RX ADMIN — MINERAL OIL AND WHITE PETROLATUM: 150; 830 OINTMENT OPHTHALMIC at 12:23

## 2021-01-01 RX ADMIN — TAZOBACTAM SODIUM AND PIPERACILLIN SODIUM 4.5 G: 500; 4 INJECTION, SOLUTION INTRAVENOUS at 23:29

## 2021-01-01 RX ADMIN — INSULIN LISPRO 2 UNITS: 100 INJECTION, SOLUTION INTRAVENOUS; SUBCUTANEOUS at 11:42

## 2021-01-01 RX ADMIN — ALBUTEROL SULFATE 2 PUFF: 90 AEROSOL, METERED RESPIRATORY (INHALATION) at 22:49

## 2021-01-01 RX ADMIN — OXYCODONE HYDROCHLORIDE AND ACETAMINOPHEN 500 MG: 500 TABLET ORAL at 08:27

## 2021-01-01 RX ADMIN — SODIUM ZIRCONIUM CYCLOSILICATE 10 G: 10 POWDER, FOR SUSPENSION ORAL at 12:36

## 2021-01-01 RX ADMIN — GUAIFENESIN 200 MG: 100 SOLUTION ORAL at 08:01

## 2021-01-01 RX ADMIN — SODIUM CHLORIDE 100 ML/HR: 4.5 INJECTION, SOLUTION INTRAVENOUS at 13:47

## 2021-01-01 RX ADMIN — SODIUM CHLORIDE 100 ML/HR: 4.5 INJECTION, SOLUTION INTRAVENOUS at 07:51

## 2021-01-01 RX ADMIN — Medication 3 MG: at 21:52

## 2021-01-01 RX ADMIN — ALBUTEROL SULFATE 2 PUFF: 90 AEROSOL, METERED RESPIRATORY (INHALATION) at 14:22

## 2021-01-01 RX ADMIN — OXYCODONE HYDROCHLORIDE AND ACETAMINOPHEN 500 MG: 500 TABLET ORAL at 09:10

## 2021-01-01 RX ADMIN — ALBUTEROL SULFATE 2 PUFF: 90 AEROSOL, METERED RESPIRATORY (INHALATION) at 10:44

## 2021-01-01 RX ADMIN — POLYETHYLENE GLYCOL 3350 17 G: 17 POWDER, FOR SOLUTION ORAL at 08:01

## 2021-01-01 RX ADMIN — THIAMINE HCL TAB 100 MG 100 MG: 100 TAB at 08:03

## 2021-01-01 RX ADMIN — GUAIFENESIN 200 MG: 100 SOLUTION ORAL at 17:05

## 2021-01-01 RX ADMIN — DEXAMETHASONE SODIUM PHOSPHATE 6 MG: 4 INJECTION, SOLUTION INTRA-ARTICULAR; INTRALESIONAL; INTRAMUSCULAR; INTRAVENOUS; SOFT TISSUE at 08:40

## 2021-01-01 RX ADMIN — LOSARTAN POTASSIUM 100 MG: 50 TABLET, FILM COATED ORAL at 09:05

## 2021-01-01 RX ADMIN — MINERAL OIL AND WHITE PETROLATUM: 150; 830 OINTMENT OPHTHALMIC at 06:07

## 2021-01-01 RX ADMIN — THIAMINE HCL TAB 100 MG 100 MG: 100 TAB at 08:52

## 2021-01-01 RX ADMIN — SODIUM BICARBONATE: 84 INJECTION INTRAVENOUS at 08:32

## 2021-01-01 RX ADMIN — APIXABAN 5 MG: 5 TABLET, FILM COATED ORAL at 20:32

## 2021-01-01 RX ADMIN — Medication 3 PACKET: at 08:03

## 2021-01-01 RX ADMIN — LOSARTAN POTASSIUM 100 MG: 50 TABLET, FILM COATED ORAL at 08:06

## 2021-01-01 RX ADMIN — ALBUTEROL SULFATE 2 PUFF: 90 AEROSOL, METERED RESPIRATORY (INHALATION) at 18:27

## 2021-01-01 RX ADMIN — BUDESONIDE AND FORMOTEROL FUMARATE DIHYDRATE 2 PUFF: 160; 4.5 AEROSOL RESPIRATORY (INHALATION) at 18:14

## 2021-01-01 RX ADMIN — THIAMINE HCL TAB 100 MG 100 MG: 100 TAB at 08:40

## 2021-01-01 RX ADMIN — ALBUTEROL SULFATE 2 PUFF: 90 AEROSOL, METERED RESPIRATORY (INHALATION) at 14:34

## 2021-01-01 RX ADMIN — DEXAMETHASONE SODIUM PHOSPHATE 6 MG: 4 INJECTION, SOLUTION INTRA-ARTICULAR; INTRALESIONAL; INTRAMUSCULAR; INTRAVENOUS; SOFT TISSUE at 09:05

## 2021-01-01 RX ADMIN — GUAIFENESIN 200 MG: 100 SOLUTION ORAL at 17:07

## 2021-01-01 RX ADMIN — MINERAL OIL AND WHITE PETROLATUM: 150; 830 OINTMENT OPHTHALMIC at 03:48

## 2021-01-01 RX ADMIN — TAZOBACTAM SODIUM AND PIPERACILLIN SODIUM 4.5 G: 500; 4 INJECTION, SOLUTION INTRAVENOUS at 23:56

## 2021-01-01 RX ADMIN — SODIUM ZIRCONIUM CYCLOSILICATE 10 G: 10 POWDER, FOR SUSPENSION ORAL at 08:00

## 2021-01-01 RX ADMIN — SODIUM CHLORIDE, PRESERVATIVE FREE 10 ML: 5 INJECTION INTRAVENOUS at 21:52

## 2021-01-01 RX ADMIN — ALBUTEROL SULFATE 2 PUFF: 90 AEROSOL, METERED RESPIRATORY (INHALATION) at 22:43

## 2021-01-01 RX ADMIN — DEXTROMETHORPHAN 60 MG: 30 SUSPENSION, EXTENDED RELEASE ORAL at 08:06

## 2021-01-01 RX ADMIN — MINERAL OIL AND WHITE PETROLATUM: 150; 830 OINTMENT OPHTHALMIC at 12:01

## 2021-01-01 RX ADMIN — ALBUTEROL SULFATE 2 PUFF: 90 AEROSOL, METERED RESPIRATORY (INHALATION) at 10:31

## 2021-01-01 RX ADMIN — OXYCODONE HYDROCHLORIDE AND ACETAMINOPHEN 500 MG: 500 TABLET ORAL at 08:07

## 2021-01-01 RX ADMIN — INSULIN LISPRO 2 UNITS: 100 INJECTION, SOLUTION INTRAVENOUS; SUBCUTANEOUS at 17:59

## 2021-01-01 RX ADMIN — GUAIFENESIN 200 MG: 100 SOLUTION ORAL at 08:20

## 2021-01-01 RX ADMIN — Medication 3 PACKET: at 20:11

## 2021-01-01 RX ADMIN — PROPOFOL 60 MCG/KG/MIN: 10 INJECTION, EMULSION INTRAVENOUS at 11:58

## 2021-01-01 RX ADMIN — Medication 3 MG: at 20:09

## 2021-01-01 RX ADMIN — ALBUTEROL SULFATE 2 PUFF: 90 AEROSOL, METERED RESPIRATORY (INHALATION) at 00:00

## 2021-01-01 RX ADMIN — MINERAL OIL AND WHITE PETROLATUM: 150; 830 OINTMENT OPHTHALMIC at 22:16

## 2021-01-01 RX ADMIN — SODIUM ZIRCONIUM CYCLOSILICATE 10 G: 10 POWDER, FOR SUSPENSION ORAL at 20:42

## 2021-01-01 RX ADMIN — SODIUM CHLORIDE, PRESERVATIVE FREE 10 ML: 5 INJECTION INTRAVENOUS at 08:04

## 2021-01-01 RX ADMIN — BUDESONIDE AND FORMOTEROL FUMARATE DIHYDRATE 2 PUFF: 160; 4.5 AEROSOL RESPIRATORY (INHALATION) at 07:18

## 2021-01-01 RX ADMIN — MINERAL OIL AND WHITE PETROLATUM: 150; 830 OINTMENT OPHTHALMIC at 02:03

## 2021-01-01 RX ADMIN — SODIUM CHLORIDE 75 ML/HR: 4.5 INJECTION, SOLUTION INTRAVENOUS at 16:03

## 2021-01-01 RX ADMIN — GUAIFENESIN 200 MG: 100 SOLUTION ORAL at 16:21

## 2021-01-01 RX ADMIN — Medication 1000 UNITS: at 08:12

## 2021-01-01 RX ADMIN — ALBUTEROL SULFATE 2 PUFF: 90 AEROSOL, METERED RESPIRATORY (INHALATION) at 02:34

## 2021-01-01 RX ADMIN — THIAMINE HCL TAB 100 MG 100 MG: 100 TAB at 08:18

## 2021-01-01 RX ADMIN — Medication 0.3 MCG/KG/MIN: at 12:58

## 2021-01-01 RX ADMIN — BUDESONIDE AND FORMOTEROL FUMARATE DIHYDRATE 2 PUFF: 160; 4.5 AEROSOL RESPIRATORY (INHALATION) at 10:59

## 2021-01-01 RX ADMIN — BUDESONIDE AND FORMOTEROL FUMARATE DIHYDRATE 2 PUFF: 160; 4.5 AEROSOL RESPIRATORY (INHALATION) at 20:20

## 2021-01-01 RX ADMIN — DEXAMETHASONE SODIUM PHOSPHATE 6 MG: 4 INJECTION, SOLUTION INTRA-ARTICULAR; INTRALESIONAL; INTRAMUSCULAR; INTRAVENOUS; SOFT TISSUE at 20:08

## 2021-01-01 RX ADMIN — MINERAL OIL AND WHITE PETROLATUM: 150; 830 OINTMENT OPHTHALMIC at 11:43

## 2021-01-01 RX ADMIN — MINERAL OIL AND WHITE PETROLATUM: 150; 830 OINTMENT OPHTHALMIC at 02:28

## 2021-01-01 RX ADMIN — BUDESONIDE AND FORMOTEROL FUMARATE DIHYDRATE 2 PUFF: 160; 4.5 AEROSOL RESPIRATORY (INHALATION) at 18:53

## 2021-01-01 RX ADMIN — TAZOBACTAM SODIUM AND PIPERACILLIN SODIUM 4.5 G: 500; 4 INJECTION, SOLUTION INTRAVENOUS at 06:10

## 2021-01-01 RX ADMIN — ALBUTEROL SULFATE 2 PUFF: 90 AEROSOL, METERED RESPIRATORY (INHALATION) at 08:40

## 2021-01-01 RX ADMIN — BUDESONIDE AND FORMOTEROL FUMARATE DIHYDRATE 2 PUFF: 160; 4.5 AEROSOL RESPIRATORY (INHALATION) at 06:52

## 2021-01-01 RX ADMIN — SODIUM CHLORIDE, PRESERVATIVE FREE 10 ML: 5 INJECTION INTRAVENOUS at 20:03

## 2021-01-01 RX ADMIN — Medication 0.3 MCG/KG/MIN: at 17:31

## 2021-01-01 RX ADMIN — PROPOFOL 70 MCG/KG/MIN: 10 INJECTION, EMULSION INTRAVENOUS at 14:40

## 2021-01-01 RX ADMIN — Medication 3 MG: at 20:08

## 2021-01-01 RX ADMIN — ENOXAPARIN SODIUM 90 MG: 100 INJECTION SUBCUTANEOUS at 08:47

## 2021-01-01 RX ADMIN — PROPOFOL 75 MCG/KG/MIN: 10 INJECTION, EMULSION INTRAVENOUS at 09:34

## 2021-01-01 RX ADMIN — PROPOFOL 70 MCG/KG/MIN: 10 INJECTION, EMULSION INTRAVENOUS at 16:42

## 2021-01-01 RX ADMIN — TAZOBACTAM SODIUM AND PIPERACILLIN SODIUM 3.38 G: 375; 3 INJECTION, SOLUTION INTRAVENOUS at 05:44

## 2021-01-01 RX ADMIN — FENTANYL CITRATE 200 MCG/HR: 50 INJECTION, SOLUTION INTRAMUSCULAR; INTRAVENOUS at 16:46

## 2021-01-01 RX ADMIN — ALBUTEROL SULFATE 2 PUFF: 90 AEROSOL, METERED RESPIRATORY (INHALATION) at 10:41

## 2021-01-01 RX ADMIN — SODIUM CHLORIDE, PRESERVATIVE FREE 10 ML: 5 INJECTION INTRAVENOUS at 08:06

## 2021-01-01 RX ADMIN — INSULIN LISPRO 2 UNITS: 100 INJECTION, SOLUTION INTRAVENOUS; SUBCUTANEOUS at 23:14

## 2021-01-01 RX ADMIN — Medication 3 MG: at 20:11

## 2021-01-01 RX ADMIN — ALBUTEROL SULFATE 2 PUFF: 90 AEROSOL, METERED RESPIRATORY (INHALATION) at 06:54

## 2021-01-01 RX ADMIN — Medication 1000 UNITS: at 08:03

## 2021-01-01 RX ADMIN — FAMOTIDINE 20 MG: 20 TABLET, FILM COATED ORAL at 20:32

## 2021-01-01 RX ADMIN — IPRATROPIUM BROMIDE AND ALBUTEROL SULFATE 3 ML: 2.5; .5 SOLUTION RESPIRATORY (INHALATION) at 14:22

## 2021-01-01 RX ADMIN — DEXAMETHASONE 6 MG: 4 TABLET ORAL at 08:02

## 2021-01-01 RX ADMIN — SODIUM ZIRCONIUM CYCLOSILICATE 10 G: 10 POWDER, FOR SUSPENSION ORAL at 20:32

## 2021-01-01 RX ADMIN — PROPOFOL 70 MCG/KG/MIN: 10 INJECTION, EMULSION INTRAVENOUS at 00:59

## 2021-01-01 RX ADMIN — GUAIFENESIN 200 MG: 100 SOLUTION ORAL at 20:31

## 2021-01-01 RX ADMIN — THIAMINE HCL TAB 100 MG 100 MG: 100 TAB at 08:27

## 2021-01-01 RX ADMIN — FAMOTIDINE 20 MG: 20 TABLET, FILM COATED ORAL at 08:27

## 2021-01-01 RX ADMIN — ZINC SULFATE 220 MG (50 MG) CAPSULE 220 MG: CAPSULE at 08:07

## 2021-01-01 RX ADMIN — FAMOTIDINE 20 MG: 20 TABLET, FILM COATED ORAL at 08:06

## 2021-01-01 RX ADMIN — MEROPENEM 1 G: 1 INJECTION, POWDER, FOR SOLUTION INTRAVENOUS at 06:06

## 2021-01-01 RX ADMIN — PROPOFOL 35 MCG/KG/MIN: 10 INJECTION, EMULSION INTRAVENOUS at 20:33

## 2021-01-01 RX ADMIN — Medication 3 MG: at 22:33

## 2021-01-01 RX ADMIN — PROPOFOL 70 MCG/KG/MIN: 10 INJECTION, EMULSION INTRAVENOUS at 17:54

## 2021-01-01 RX ADMIN — Medication 0.04 MCG/KG/MIN: at 04:49

## 2021-01-01 RX ADMIN — INSULIN LISPRO 2 UNITS: 100 INJECTION, SOLUTION INTRAVENOUS; SUBCUTANEOUS at 12:23

## 2021-01-01 RX ADMIN — ENOXAPARIN SODIUM 80 MG: 100 INJECTION SUBCUTANEOUS at 20:10

## 2021-01-01 RX ADMIN — INSULIN LISPRO 4 UNITS: 100 INJECTION, SOLUTION INTRAVENOUS; SUBCUTANEOUS at 00:15

## 2021-01-01 RX ADMIN — INSULIN LISPRO 2 UNITS: 100 INJECTION, SOLUTION INTRAVENOUS; SUBCUTANEOUS at 23:55

## 2021-01-01 RX ADMIN — ALBUTEROL SULFATE 2 PUFF: 90 AEROSOL, METERED RESPIRATORY (INHALATION) at 02:08

## 2021-01-01 RX ADMIN — FENTANYL CITRATE 200 MCG/HR: 50 INJECTION, SOLUTION INTRAMUSCULAR; INTRAVENOUS at 20:55

## 2021-01-01 RX ADMIN — BENZONATATE 200 MG: 100 CAPSULE ORAL at 06:29

## 2021-01-01 RX ADMIN — GUAIFENESIN 200 MG: 100 SOLUTION ORAL at 08:40

## 2021-01-01 RX ADMIN — FENOFIBRATE 48 MG: 48 TABLET ORAL at 08:03

## 2021-01-01 RX ADMIN — DEXAMETHASONE SODIUM PHOSPHATE 6 MG: 4 INJECTION, SOLUTION INTRA-ARTICULAR; INTRALESIONAL; INTRAMUSCULAR; INTRAVENOUS; SOFT TISSUE at 08:05

## 2021-01-01 RX ADMIN — ALBUTEROL SULFATE 2 PUFF: 90 AEROSOL, METERED RESPIRATORY (INHALATION) at 06:42

## 2021-01-01 RX ADMIN — MINERAL OIL AND WHITE PETROLATUM: 150; 830 OINTMENT OPHTHALMIC at 06:10

## 2021-01-01 RX ADMIN — INSULIN LISPRO 2 UNITS: 100 INJECTION, SOLUTION INTRAVENOUS; SUBCUTANEOUS at 12:45

## 2021-01-01 RX ADMIN — DEXTROSE MONOHYDRATE 50 ML: 500 INJECTION PARENTERAL at 08:38

## 2021-01-01 RX ADMIN — SODIUM CHLORIDE, PRESERVATIVE FREE 10 ML: 5 INJECTION INTRAVENOUS at 20:10

## 2021-01-01 RX ADMIN — TAZOBACTAM SODIUM AND PIPERACILLIN SODIUM 3.38 G: 375; 3 INJECTION, SOLUTION INTRAVENOUS at 18:26

## 2021-01-01 RX ADMIN — PROPOFOL 75 MCG/KG/MIN: 10 INJECTION, EMULSION INTRAVENOUS at 00:23

## 2021-01-01 RX ADMIN — FENTANYL CITRATE 200 MCG/HR: 50 INJECTION, SOLUTION INTRAMUSCULAR; INTRAVENOUS at 17:37

## 2021-01-01 RX ADMIN — MINERAL OIL AND WHITE PETROLATUM: 150; 830 OINTMENT OPHTHALMIC at 20:35

## 2021-01-01 RX ADMIN — DEXAMETHASONE SODIUM PHOSPHATE 6 MG: 4 INJECTION, SOLUTION INTRA-ARTICULAR; INTRALESIONAL; INTRAMUSCULAR; INTRAVENOUS; SOFT TISSUE at 20:05

## 2021-01-01 RX ADMIN — POLYETHYLENE GLYCOL 3350 17 G: 17 POWDER, FOR SOLUTION ORAL at 08:07

## 2021-01-01 RX ADMIN — GUAIFENESIN 200 MG: 100 SOLUTION ORAL at 08:06

## 2021-01-01 RX ADMIN — ROCURONIUM BROMIDE 8 MCG/KG/MIN: 50 INJECTION INTRAVENOUS at 15:13

## 2021-01-01 RX ADMIN — POLYETHYLENE GLYCOL 3350 17 G: 17 POWDER, FOR SOLUTION ORAL at 08:05

## 2021-01-01 RX ADMIN — MINERAL OIL AND WHITE PETROLATUM: 150; 830 OINTMENT OPHTHALMIC at 18:23

## 2021-01-01 RX ADMIN — FAMOTIDINE 20 MG: 20 TABLET, FILM COATED ORAL at 09:06

## 2021-01-01 RX ADMIN — PROPOFOL 40 MCG/KG/MIN: 10 INJECTION, EMULSION INTRAVENOUS at 14:01

## 2021-01-01 RX ADMIN — MEROPENEM 1 G: 1 INJECTION, POWDER, FOR SOLUTION INTRAVENOUS at 18:07

## 2021-01-01 RX ADMIN — Medication 0.06 MCG/KG/MIN: at 20:06

## 2021-01-01 RX ADMIN — FAMOTIDINE 20 MG: 20 TABLET, FILM COATED ORAL at 20:57

## 2021-01-01 RX ADMIN — PROPOFOL 60 MCG/KG/MIN: 10 INJECTION, EMULSION INTRAVENOUS at 00:51

## 2021-01-01 RX ADMIN — LIDOCAINE HYDROCHLORIDE ANHYDROUS 1 ML: 10 INJECTION, SOLUTION INFILTRATION at 13:38

## 2021-01-01 RX ADMIN — GUAIFENESIN 200 MG: 100 SOLUTION ORAL at 16:52

## 2021-01-01 RX ADMIN — FAMOTIDINE 20 MG: 20 TABLET, FILM COATED ORAL at 08:52

## 2021-01-01 RX ADMIN — Medication 3 MG: at 20:00

## 2021-01-01 RX ADMIN — BUDESONIDE AND FORMOTEROL FUMARATE DIHYDRATE 2 PUFF: 160; 4.5 AEROSOL RESPIRATORY (INHALATION) at 19:26

## 2021-01-01 RX ADMIN — ROCURONIUM BROMIDE 8 MCG/KG/MIN: 50 INJECTION INTRAVENOUS at 18:34

## 2021-01-01 RX ADMIN — CEFTRIAXONE SODIUM 1 G: 1 INJECTION, POWDER, FOR SOLUTION INTRAMUSCULAR; INTRAVENOUS at 03:31

## 2021-01-01 RX ADMIN — PROPOFOL 70 MCG/KG/MIN: 10 INJECTION, EMULSION INTRAVENOUS at 19:08

## 2021-01-01 RX ADMIN — OXYCODONE HYDROCHLORIDE AND ACETAMINOPHEN 500 MG: 500 TABLET ORAL at 08:01

## 2021-01-01 RX ADMIN — FAMOTIDINE 20 MG: 20 TABLET, FILM COATED ORAL at 20:31

## 2021-01-01 RX ADMIN — DOXYCYCLINE 100 MG: 100 TABLET, FILM COATED ORAL at 06:28

## 2021-01-01 RX ADMIN — PROPOFOL 75 MCG/KG/MIN: 10 INJECTION, EMULSION INTRAVENOUS at 14:01

## 2021-01-01 RX ADMIN — ZINC SULFATE 220 MG (50 MG) CAPSULE 220 MG: CAPSULE at 09:48

## 2021-01-01 RX ADMIN — MINERAL OIL AND WHITE PETROLATUM: 150; 830 OINTMENT OPHTHALMIC at 18:01

## 2021-01-01 RX ADMIN — ALBUTEROL SULFATE 2 PUFF: 90 AEROSOL, METERED RESPIRATORY (INHALATION) at 03:03

## 2021-01-01 RX ADMIN — ENOXAPARIN SODIUM 90 MG: 100 INJECTION SUBCUTANEOUS at 20:30

## 2021-01-01 RX ADMIN — MEROPENEM 1 G: 1 INJECTION, POWDER, FOR SOLUTION INTRAVENOUS at 17:36

## 2021-01-01 RX ADMIN — PROPOFOL 75 MCG/KG/MIN: 10 INJECTION, EMULSION INTRAVENOUS at 07:20

## 2021-01-01 RX ADMIN — Medication 1 TABLET: at 08:05

## 2021-01-01 RX ADMIN — ROCURONIUM BROMIDE 8 MCG/KG/MIN: 50 INJECTION INTRAVENOUS at 14:45

## 2021-01-01 RX ADMIN — OXYCODONE HYDROCHLORIDE AND ACETAMINOPHEN 500 MG: 500 TABLET ORAL at 09:06

## 2021-01-01 RX ADMIN — SODIUM BICARBONATE: 84 INJECTION INTRAVENOUS at 15:49

## 2021-01-01 RX ADMIN — LOSARTAN POTASSIUM 100 MG: 50 TABLET, FILM COATED ORAL at 08:03

## 2021-01-01 RX ADMIN — TAZOBACTAM SODIUM AND PIPERACILLIN SODIUM 3.38 G: 375; 3 INJECTION, SOLUTION INTRAVENOUS at 05:31

## 2021-01-01 RX ADMIN — ALBUTEROL SULFATE 2 PUFF: 90 AEROSOL, METERED RESPIRATORY (INHALATION) at 15:30

## 2021-01-01 RX ADMIN — ALBUTEROL SULFATE 2 PUFF: 90 AEROSOL, METERED RESPIRATORY (INHALATION) at 10:36

## 2021-01-01 RX ADMIN — POLYETHYLENE GLYCOL 3350 17 G: 17 POWDER, FOR SOLUTION ORAL at 08:51

## 2021-01-01 RX ADMIN — FENOFIBRATE 48 MG: 48 TABLET ORAL at 08:12

## 2021-01-01 RX ADMIN — SODIUM CHLORIDE, PRESERVATIVE FREE 10 ML: 5 INJECTION INTRAVENOUS at 08:41

## 2021-01-01 RX ADMIN — ENOXAPARIN SODIUM 80 MG: 100 INJECTION SUBCUTANEOUS at 08:21

## 2021-01-01 RX ADMIN — ALBUTEROL SULFATE 2 PUFF: 90 AEROSOL, METERED RESPIRATORY (INHALATION) at 13:54

## 2021-01-01 RX ADMIN — ENOXAPARIN SODIUM 90 MG: 100 INJECTION SUBCUTANEOUS at 08:25

## 2021-01-01 RX ADMIN — ALBUTEROL SULFATE 2 PUFF: 90 AEROSOL, METERED RESPIRATORY (INHALATION) at 18:34

## 2021-01-01 RX ADMIN — FENOFIBRATE 145 MG: 145 TABLET ORAL at 08:47

## 2021-01-01 RX ADMIN — Medication 3 MG: at 20:07

## 2021-01-01 RX ADMIN — DOXYCYCLINE 100 MG: 100 TABLET, FILM COATED ORAL at 05:08

## 2021-01-01 RX ADMIN — THIAMINE HCL TAB 100 MG 100 MG: 100 TAB at 08:13

## 2021-01-01 RX ADMIN — DEXAMETHASONE 6 MG: 4 TABLET ORAL at 08:26

## 2021-01-01 RX ADMIN — PROPOFOL 50 MCG/KG/MIN: 10 INJECTION, EMULSION INTRAVENOUS at 17:02

## 2021-01-01 RX ADMIN — Medication 1000 UNITS: at 08:07

## 2021-01-01 RX ADMIN — ZINC SULFATE 220 MG (50 MG) CAPSULE 220 MG: CAPSULE at 09:11

## 2021-01-01 RX ADMIN — GUAIFENESIN 200 MG: 100 SOLUTION ORAL at 10:05

## 2021-01-01 RX ADMIN — GUAIFENESIN 200 MG: 100 SOLUTION ORAL at 17:14

## 2021-01-01 RX ADMIN — MINERAL OIL AND WHITE PETROLATUM: 150; 830 OINTMENT OPHTHALMIC at 16:11

## 2021-01-01 RX ADMIN — SODIUM ZIRCONIUM CYCLOSILICATE 10 G: 10 POWDER, FOR SUSPENSION ORAL at 10:49

## 2021-01-01 RX ADMIN — PROPOFOL 75 MCG/KG/MIN: 10 INJECTION, EMULSION INTRAVENOUS at 12:06

## 2021-01-01 RX ADMIN — ENOXAPARIN SODIUM 90 MG: 100 INJECTION SUBCUTANEOUS at 20:24

## 2021-01-01 RX ADMIN — BUDESONIDE AND FORMOTEROL FUMARATE DIHYDRATE 2 PUFF: 160; 4.5 AEROSOL RESPIRATORY (INHALATION) at 06:56

## 2021-01-01 RX ADMIN — MINERAL OIL AND WHITE PETROLATUM: 150; 830 OINTMENT OPHTHALMIC at 23:06

## 2021-01-01 RX ADMIN — Medication 0.26 MCG/KG/MIN: at 18:19

## 2021-01-01 RX ADMIN — ENOXAPARIN SODIUM 90 MG: 100 INJECTION SUBCUTANEOUS at 08:01

## 2021-01-01 RX ADMIN — GUAIFENESIN 200 MG: 100 SOLUTION ORAL at 15:44

## 2021-01-01 RX ADMIN — Medication 1 TABLET: at 10:06

## 2021-01-01 RX ADMIN — SODIUM ZIRCONIUM CYCLOSILICATE 10 G: 10 POWDER, FOR SUSPENSION ORAL at 20:05

## 2021-01-01 RX ADMIN — MINERAL OIL AND WHITE PETROLATUM: 150; 830 OINTMENT OPHTHALMIC at 03:18

## 2021-01-01 RX ADMIN — IPRATROPIUM BROMIDE AND ALBUTEROL SULFATE 3 ML: 2.5; .5 SOLUTION RESPIRATORY (INHALATION) at 10:30

## 2021-01-01 RX ADMIN — INSULIN LISPRO 2 UNITS: 100 INJECTION, SOLUTION INTRAVENOUS; SUBCUTANEOUS at 23:44

## 2021-01-01 RX ADMIN — DEXAMETHASONE 6 MG: 4 TABLET ORAL at 08:52

## 2021-01-01 RX ADMIN — MINERAL OIL AND WHITE PETROLATUM: 150; 830 OINTMENT OPHTHALMIC at 06:00

## 2021-01-01 RX ADMIN — PROPOFOL 55 MCG/KG/MIN: 10 INJECTION, EMULSION INTRAVENOUS at 23:07

## 2021-01-01 RX ADMIN — DEXAMETHASONE SODIUM PHOSPHATE 6 MG: 4 INJECTION, SOLUTION INTRA-ARTICULAR; INTRALESIONAL; INTRAMUSCULAR; INTRAVENOUS; SOFT TISSUE at 09:21

## 2021-01-01 RX ADMIN — MINERAL OIL AND WHITE PETROLATUM: 150; 830 OINTMENT OPHTHALMIC at 18:08

## 2021-01-01 RX ADMIN — PROPOFOL 70 MCG/KG/MIN: 10 INJECTION, EMULSION INTRAVENOUS at 22:30

## 2021-01-01 RX ADMIN — Medication 0.1 MCG/KG/MIN: at 22:15

## 2021-01-01 RX ADMIN — Medication 0.3 MCG/KG/MIN: at 23:48

## 2021-01-01 RX ADMIN — ALBUTEROL SULFATE 2 PUFF: 90 AEROSOL, METERED RESPIRATORY (INHALATION) at 22:36

## 2021-01-01 RX ADMIN — FAMOTIDINE 20 MG: 20 TABLET, FILM COATED ORAL at 20:09

## 2021-01-01 RX ADMIN — OXYCODONE HYDROCHLORIDE AND ACETAMINOPHEN 500 MG: 500 TABLET ORAL at 10:06

## 2021-01-01 RX ADMIN — MINERAL OIL AND WHITE PETROLATUM: 150; 830 OINTMENT OPHTHALMIC at 19:51

## 2021-01-01 RX ADMIN — SODIUM CHLORIDE, PRESERVATIVE FREE 10 ML: 5 INJECTION INTRAVENOUS at 20:28

## 2021-01-01 RX ADMIN — ALBUTEROL SULFATE 2 PUFF: 90 AEROSOL, METERED RESPIRATORY (INHALATION) at 02:52

## 2021-01-01 RX ADMIN — THIAMINE HCL TAB 100 MG 100 MG: 100 TAB at 08:12

## 2021-01-01 RX ADMIN — ROCURONIUM BROMIDE 8 MCG/KG/MIN: 50 INJECTION INTRAVENOUS at 10:16

## 2021-01-01 RX ADMIN — INSULIN LISPRO 2 UNITS: 100 INJECTION, SOLUTION INTRAVENOUS; SUBCUTANEOUS at 05:55

## 2021-01-01 RX ADMIN — INSULIN LISPRO 2 UNITS: 100 INJECTION, SOLUTION INTRAVENOUS; SUBCUTANEOUS at 23:41

## 2021-01-01 RX ADMIN — BUDESONIDE AND FORMOTEROL FUMARATE DIHYDRATE 2 PUFF: 160; 4.5 AEROSOL RESPIRATORY (INHALATION) at 08:12

## 2021-01-01 RX ADMIN — MINERAL OIL AND WHITE PETROLATUM: 150; 830 OINTMENT OPHTHALMIC at 12:25

## 2021-01-01 RX ADMIN — FAMOTIDINE 20 MG: 20 TABLET, FILM COATED ORAL at 20:28

## 2021-01-01 RX ADMIN — PROPOFOL 35 MCG/KG/MIN: 10 INJECTION, EMULSION INTRAVENOUS at 16:14

## 2021-01-01 RX ADMIN — SODIUM ZIRCONIUM CYCLOSILICATE 10 G: 10 POWDER, FOR SUSPENSION ORAL at 10:19

## 2021-01-01 RX ADMIN — ALBUTEROL SULFATE 2 PUFF: 90 AEROSOL, METERED RESPIRATORY (INHALATION) at 19:38

## 2021-01-01 RX ADMIN — ALBUTEROL SULFATE 2 PUFF: 90 AEROSOL, METERED RESPIRATORY (INHALATION) at 03:58

## 2021-01-01 RX ADMIN — POLYETHYLENE GLYCOL 3350 17 G: 17 POWDER, FOR SOLUTION ORAL at 11:54

## 2021-01-01 RX ADMIN — ALBUTEROL SULFATE 2 PUFF: 90 AEROSOL, METERED RESPIRATORY (INHALATION) at 23:03

## 2021-01-01 RX ADMIN — BUDESONIDE AND FORMOTEROL FUMARATE DIHYDRATE 2 PUFF: 160; 4.5 AEROSOL RESPIRATORY (INHALATION) at 18:27

## 2021-01-01 RX ADMIN — LEVOFLOXACIN 750 MG: 5 INJECTION, SOLUTION INTRAVENOUS at 12:28

## 2021-01-01 RX ADMIN — SODIUM ZIRCONIUM CYCLOSILICATE 10 G: 10 POWDER, FOR SUSPENSION ORAL at 09:34

## 2021-01-01 RX ADMIN — GUAIFENESIN 200 MG: 100 SOLUTION ORAL at 08:47

## 2021-01-01 RX ADMIN — ATORVASTATIN CALCIUM 10 MG: 10 TABLET, FILM COATED ORAL at 20:09

## 2021-01-01 RX ADMIN — GUAIFENESIN 200 MG: 100 SOLUTION ORAL at 12:07

## 2021-01-01 RX ADMIN — MINERAL OIL AND WHITE PETROLATUM: 150; 830 OINTMENT OPHTHALMIC at 10:32

## 2021-01-01 RX ADMIN — PROPOFOL 60 MCG/KG/MIN: 10 INJECTION, EMULSION INTRAVENOUS at 08:22

## 2021-01-01 RX ADMIN — ALBUTEROL SULFATE 2 PUFF: 90 AEROSOL, METERED RESPIRATORY (INHALATION) at 23:30

## 2021-01-01 RX ADMIN — PROPOFOL 65 MCG/KG/MIN: 10 INJECTION, EMULSION INTRAVENOUS at 06:07

## 2021-01-01 RX ADMIN — INSULIN LISPRO 2 UNITS: 100 INJECTION, SOLUTION INTRAVENOUS; SUBCUTANEOUS at 12:35

## 2021-01-01 RX ADMIN — ENOXAPARIN SODIUM 90 MG: 100 INJECTION SUBCUTANEOUS at 20:08

## 2021-01-01 RX ADMIN — ALBUTEROL SULFATE 2 PUFF: 90 AEROSOL, METERED RESPIRATORY (INHALATION) at 20:08

## 2021-01-01 RX ADMIN — MINERAL OIL AND WHITE PETROLATUM: 150; 830 OINTMENT OPHTHALMIC at 14:26

## 2021-01-01 RX ADMIN — GUAIFENESIN 200 MG: 100 SOLUTION ORAL at 16:12

## 2021-01-01 RX ADMIN — PROPOFOL 70 MCG/KG/MIN: 10 INJECTION, EMULSION INTRAVENOUS at 15:08

## 2021-01-01 RX ADMIN — ENOXAPARIN SODIUM 90 MG: 100 INJECTION SUBCUTANEOUS at 20:04

## 2021-01-01 RX ADMIN — ALBUTEROL SULFATE 2 PUFF: 90 AEROSOL, METERED RESPIRATORY (INHALATION) at 02:46

## 2021-01-01 RX ADMIN — Medication 0.3 MCG/KG/MIN: at 23:04

## 2021-01-01 RX ADMIN — Medication 0.3 MCG/KG/MIN: at 13:41

## 2021-01-01 RX ADMIN — ALBUTEROL SULFATE 2 PUFF: 90 AEROSOL, METERED RESPIRATORY (INHALATION) at 07:23

## 2021-01-01 RX ADMIN — FENOFIBRATE 48 MG: 48 TABLET ORAL at 08:18

## 2021-01-01 RX ADMIN — GUAIFENESIN 200 MG: 100 SOLUTION ORAL at 16:13

## 2021-01-01 RX ADMIN — BUDESONIDE AND FORMOTEROL FUMARATE DIHYDRATE 2 PUFF: 160; 4.5 AEROSOL RESPIRATORY (INHALATION) at 19:18

## 2021-01-01 RX ADMIN — MINERAL OIL AND WHITE PETROLATUM: 150; 830 OINTMENT OPHTHALMIC at 23:36

## 2021-01-01 RX ADMIN — GUAIFENESIN 200 MG: 100 SOLUTION ORAL at 08:11

## 2021-01-01 RX ADMIN — DEXAMETHASONE SODIUM PHOSPHATE 6 MG: 4 INJECTION, SOLUTION INTRA-ARTICULAR; INTRALESIONAL; INTRAMUSCULAR; INTRAVENOUS; SOFT TISSUE at 20:01

## 2021-01-01 RX ADMIN — Medication 1 TABLET: at 09:21

## 2021-01-01 RX ADMIN — Medication 1000 UNITS: at 09:06

## 2021-01-01 RX ADMIN — OXYCODONE HYDROCHLORIDE AND ACETAMINOPHEN 500 MG: 500 TABLET ORAL at 08:06

## 2021-01-01 RX ADMIN — INSULIN HUMAN 10 UNITS: 100 INJECTION, SOLUTION PARENTERAL at 12:36

## 2021-01-01 RX ADMIN — PROPOFOL 70 MCG/KG/MIN: 10 INJECTION, EMULSION INTRAVENOUS at 04:56

## 2021-01-01 RX ADMIN — GUAIFENESIN 200 MG: 100 SOLUTION ORAL at 08:05

## 2021-01-01 RX ADMIN — Medication 3 PACKET: at 21:04

## 2021-01-01 RX ADMIN — THIAMINE HCL TAB 100 MG 100 MG: 100 TAB at 08:06

## 2021-01-01 RX ADMIN — ALBUTEROL SULFATE 2 PUFF: 90 AEROSOL, METERED RESPIRATORY (INHALATION) at 22:27

## 2021-01-01 RX ADMIN — SODIUM CHLORIDE, PRESERVATIVE FREE 10 ML: 5 INJECTION INTRAVENOUS at 01:26

## 2021-01-01 RX ADMIN — MEROPENEM 1 G: 1 INJECTION, POWDER, FOR SOLUTION INTRAVENOUS at 06:26

## 2021-01-01 RX ADMIN — OXYCODONE HYDROCHLORIDE AND ACETAMINOPHEN 500 MG: 500 TABLET ORAL at 09:47

## 2021-01-01 RX ADMIN — DEXAMETHASONE 6 MG: 4 TABLET ORAL at 08:25

## 2021-01-01 RX ADMIN — TAZOBACTAM SODIUM AND PIPERACILLIN SODIUM 4.5 G: 500; 4 INJECTION, SOLUTION INTRAVENOUS at 17:06

## 2021-01-01 RX ADMIN — FENOFIBRATE 145 MG: 145 TABLET ORAL at 08:05

## 2021-01-01 RX ADMIN — GUAIFENESIN 200 MG: 100 SOLUTION ORAL at 20:32

## 2021-01-01 RX ADMIN — CEFTRIAXONE SODIUM 1 G: 1 INJECTION, POWDER, FOR SOLUTION INTRAMUSCULAR; INTRAVENOUS at 01:14

## 2021-01-01 RX ADMIN — ALBUTEROL SULFATE 2 PUFF: 90 AEROSOL, METERED RESPIRATORY (INHALATION) at 23:13

## 2021-01-01 RX ADMIN — GUAIFENESIN 200 MG: 100 SOLUTION ORAL at 23:29

## 2021-01-01 RX ADMIN — Medication 1 TABLET: at 09:06

## 2021-01-01 RX ADMIN — ALBUTEROL SULFATE 2 PUFF: 90 AEROSOL, METERED RESPIRATORY (INHALATION) at 22:47

## 2021-01-01 RX ADMIN — SODIUM CHLORIDE, PRESERVATIVE FREE 10 ML: 5 INJECTION INTRAVENOUS at 20:25

## 2021-01-01 RX ADMIN — THIAMINE HYDROCHLORIDE 100 MG: 100 INJECTION, SOLUTION INTRAMUSCULAR; INTRAVENOUS at 09:21

## 2021-01-01 RX ADMIN — ZINC SULFATE 220 MG (50 MG) CAPSULE 220 MG: CAPSULE at 08:25

## 2021-01-01 RX ADMIN — ALBUTEROL SULFATE 2 PUFF: 90 AEROSOL, METERED RESPIRATORY (INHALATION) at 23:11

## 2021-01-01 RX ADMIN — Medication 1000 UNITS: at 08:25

## 2021-01-01 RX ADMIN — INSULIN LISPRO 2 UNITS: 100 INJECTION, SOLUTION INTRAVENOUS; SUBCUTANEOUS at 06:07

## 2021-01-01 RX ADMIN — ALBUTEROL SULFATE 2 PUFF: 90 AEROSOL, METERED RESPIRATORY (INHALATION) at 02:57

## 2021-01-01 RX ADMIN — APIXABAN 5 MG: 5 TABLET, FILM COATED ORAL at 09:06

## 2021-01-01 RX ADMIN — SODIUM CHLORIDE, PRESERVATIVE FREE 10 ML: 5 INJECTION INTRAVENOUS at 10:06

## 2021-01-01 RX ADMIN — DEXAMETHASONE 6 MG: 4 TABLET ORAL at 20:10

## 2021-01-01 RX ADMIN — ENOXAPARIN SODIUM 80 MG: 100 INJECTION SUBCUTANEOUS at 20:03

## 2021-01-01 RX ADMIN — PROPOFOL 50 MCG/KG/MIN: 10 INJECTION, EMULSION INTRAVENOUS at 18:29

## 2021-01-01 RX ADMIN — BUDESONIDE AND FORMOTEROL FUMARATE DIHYDRATE 2 PUFF: 160; 4.5 AEROSOL RESPIRATORY (INHALATION) at 06:58

## 2021-01-01 RX ADMIN — GUAIFENESIN 200 MG: 100 SOLUTION ORAL at 20:42

## 2021-01-01 RX ADMIN — MINERAL OIL AND WHITE PETROLATUM: 150; 830 OINTMENT OPHTHALMIC at 18:02

## 2021-01-01 RX ADMIN — PROPOFOL 45 MCG/KG/MIN: 10 INJECTION, EMULSION INTRAVENOUS at 04:48

## 2021-01-01 RX ADMIN — Medication 1000 UNITS: at 08:52

## 2021-01-01 RX ADMIN — FENTANYL CITRATE 200 MCG/HR: 50 INJECTION, SOLUTION INTRAMUSCULAR; INTRAVENOUS at 03:52

## 2021-01-01 RX ADMIN — FENOFIBRATE 48 MG: 48 TABLET ORAL at 09:11

## 2021-01-01 RX ADMIN — Medication 1 TABLET: at 08:02

## 2021-01-01 RX ADMIN — MINERAL OIL AND WHITE PETROLATUM: 150; 830 OINTMENT OPHTHALMIC at 14:15

## 2021-01-01 RX ADMIN — BUDESONIDE AND FORMOTEROL FUMARATE DIHYDRATE 2 PUFF: 160; 4.5 AEROSOL RESPIRATORY (INHALATION) at 19:23

## 2021-01-01 RX ADMIN — Medication 3 MG: at 20:04

## 2021-01-01 RX ADMIN — DEXAMETHASONE SODIUM PHOSPHATE 6 MG: 4 INJECTION, SOLUTION INTRA-ARTICULAR; INTRALESIONAL; INTRAMUSCULAR; INTRAVENOUS; SOFT TISSUE at 08:07

## 2021-01-01 RX ADMIN — BUDESONIDE AND FORMOTEROL FUMARATE DIHYDRATE 2 PUFF: 160; 4.5 AEROSOL RESPIRATORY (INHALATION) at 18:48

## 2021-01-01 RX ADMIN — Medication 3 MG: at 20:30

## 2021-01-01 RX ADMIN — GUAIFENESIN 200 MG: 100 SOLUTION ORAL at 15:59

## 2021-01-01 RX ADMIN — Medication 1 TABLET: at 10:48

## 2021-01-01 RX ADMIN — PROPOFOL 65 MCG/KG/MIN: 10 INJECTION, EMULSION INTRAVENOUS at 09:10

## 2021-01-01 RX ADMIN — APIXABAN 5 MG: 5 TABLET, FILM COATED ORAL at 08:26

## 2021-01-01 RX ADMIN — FENOFIBRATE 145 MG: 145 TABLET ORAL at 08:02

## 2021-01-01 RX ADMIN — PROPOFOL 65 MCG/KG/MIN: 10 INJECTION, EMULSION INTRAVENOUS at 23:54

## 2021-01-01 RX ADMIN — GUAIFENESIN 200 MG: 100 SOLUTION ORAL at 16:09

## 2021-01-01 RX ADMIN — ALBUTEROL SULFATE 2 PUFF: 90 AEROSOL, METERED RESPIRATORY (INHALATION) at 03:02

## 2021-01-01 RX ADMIN — MINERAL OIL AND WHITE PETROLATUM: 150; 830 OINTMENT OPHTHALMIC at 16:14

## 2021-01-01 RX ADMIN — PROPOFOL 5 MCG/KG/MIN: 10 INJECTION, EMULSION INTRAVENOUS at 12:00

## 2021-01-01 RX ADMIN — DEXAMETHASONE SODIUM PHOSPHATE 6 MG: 4 INJECTION, SOLUTION INTRA-ARTICULAR; INTRALESIONAL; INTRAMUSCULAR; INTRAVENOUS; SOFT TISSUE at 08:01

## 2021-01-01 RX ADMIN — MEROPENEM 1 G: 1 INJECTION, POWDER, FOR SOLUTION INTRAVENOUS at 16:21

## 2021-01-01 RX ADMIN — PHENYLEPHRINE HYDROCHLORIDE 1.4 MCG/KG/MIN: 10 INJECTION INTRAVENOUS at 11:35

## 2021-01-01 RX ADMIN — ALBUTEROL SULFATE 2 PUFF: 90 AEROSOL, METERED RESPIRATORY (INHALATION) at 18:14

## 2021-01-01 RX ADMIN — FENTANYL CITRATE 200 MCG/HR: 50 INJECTION, SOLUTION INTRAMUSCULAR; INTRAVENOUS at 05:36

## 2021-01-01 RX ADMIN — PROPOFOL 75 MCG/KG/MIN: 10 INJECTION, EMULSION INTRAVENOUS at 02:47

## 2021-01-01 RX ADMIN — FENTANYL CITRATE 200 MCG/HR: 50 INJECTION, SOLUTION INTRAMUSCULAR; INTRAVENOUS at 06:56

## 2021-01-01 RX ADMIN — INSULIN LISPRO 2 UNITS: 100 INJECTION, SOLUTION INTRAVENOUS; SUBCUTANEOUS at 12:12

## 2021-01-01 RX ADMIN — ALBUTEROL SULFATE 2 PUFF: 90 AEROSOL, METERED RESPIRATORY (INHALATION) at 03:13

## 2021-01-01 RX ADMIN — FENTANYL CITRATE 100 MCG/HR: 50 INJECTION, SOLUTION INTRAMUSCULAR; INTRAVENOUS at 18:36

## 2021-01-01 RX ADMIN — DOXYCYCLINE 100 MG: 100 TABLET, FILM COATED ORAL at 17:53

## 2021-01-01 RX ADMIN — FAMOTIDINE 20 MG: 20 TABLET, FILM COATED ORAL at 08:25

## 2021-01-01 RX ADMIN — BUDESONIDE AND FORMOTEROL FUMARATE DIHYDRATE 2 PUFF: 160; 4.5 AEROSOL RESPIRATORY (INHALATION) at 18:13

## 2021-01-01 RX ADMIN — MINERAL OIL AND WHITE PETROLATUM: 150; 830 OINTMENT OPHTHALMIC at 23:38

## 2021-01-01 RX ADMIN — INSULIN LISPRO 4 UNITS: 100 INJECTION, SOLUTION INTRAVENOUS; SUBCUTANEOUS at 00:07

## 2021-01-01 RX ADMIN — ALBUTEROL SULFATE 2 PUFF: 90 AEROSOL, METERED RESPIRATORY (INHALATION) at 18:31

## 2021-01-01 RX ADMIN — POLYETHYLENE GLYCOL 3350 17 G: 17 POWDER, FOR SOLUTION ORAL at 08:06

## 2021-01-01 RX ADMIN — MINERAL OIL AND WHITE PETROLATUM: 150; 830 OINTMENT OPHTHALMIC at 23:24

## 2021-01-01 RX ADMIN — ACETAMINOPHEN 650 MG: 325 TABLET, FILM COATED ORAL at 08:06

## 2021-01-01 RX ADMIN — ATORVASTATIN CALCIUM 10 MG: 10 TABLET, FILM COATED ORAL at 20:01

## 2021-01-01 RX ADMIN — BUDESONIDE AND FORMOTEROL FUMARATE DIHYDRATE 2 PUFF: 160; 4.5 AEROSOL RESPIRATORY (INHALATION) at 07:21

## 2021-01-01 RX ADMIN — Medication 1000 UNITS: at 08:01

## 2021-01-01 RX ADMIN — Medication 1 TABLET: at 08:03

## 2021-01-01 RX ADMIN — FAMOTIDINE 20 MG: 20 TABLET, FILM COATED ORAL at 20:43

## 2021-01-01 RX ADMIN — Medication 3 MG: at 20:56

## 2021-01-01 RX ADMIN — SODIUM ZIRCONIUM CYCLOSILICATE 10 G: 10 POWDER, FOR SUSPENSION ORAL at 16:21

## 2021-01-01 RX ADMIN — GUAIFENESIN 200 MG: 100 SOLUTION ORAL at 21:52

## 2021-01-01 RX ADMIN — ALBUTEROL SULFATE 2 PUFF: 90 AEROSOL, METERED RESPIRATORY (INHALATION) at 14:38

## 2021-01-01 RX ADMIN — INSULIN LISPRO 2 UNITS: 100 INJECTION, SOLUTION INTRAVENOUS; SUBCUTANEOUS at 05:25

## 2021-01-01 RX ADMIN — INSULIN LISPRO 4 UNITS: 100 INJECTION, SOLUTION INTRAVENOUS; SUBCUTANEOUS at 00:23

## 2021-01-01 RX ADMIN — ZINC SULFATE 220 MG (50 MG) CAPSULE 220 MG: CAPSULE at 09:05

## 2021-01-01 RX ADMIN — DEXAMETHASONE SODIUM PHOSPHATE 6 MG: 4 INJECTION, SOLUTION INTRA-ARTICULAR; INTRALESIONAL; INTRAMUSCULAR; INTRAVENOUS; SOFT TISSUE at 20:00

## 2021-01-01 RX ADMIN — ALBUTEROL SULFATE 2 PUFF: 90 AEROSOL, METERED RESPIRATORY (INHALATION) at 23:09

## 2021-01-01 RX ADMIN — INSULIN LISPRO 2 UNITS: 100 INJECTION, SOLUTION INTRAVENOUS; SUBCUTANEOUS at 12:43

## 2021-01-01 RX ADMIN — BUDESONIDE AND FORMOTEROL FUMARATE DIHYDRATE 2 PUFF: 160; 4.5 AEROSOL RESPIRATORY (INHALATION) at 20:13

## 2021-01-01 RX ADMIN — PROPOFOL 60 MCG/KG/MIN: 10 INJECTION, EMULSION INTRAVENOUS at 03:56

## 2021-01-01 RX ADMIN — PROPOFOL 75 MCG/KG/MIN: 10 INJECTION, EMULSION INTRAVENOUS at 16:03

## 2021-01-01 RX ADMIN — Medication 0.16 MCG/KG/MIN: at 03:32

## 2021-01-01 RX ADMIN — ALBUTEROL SULFATE 2 PUFF: 90 AEROSOL, METERED RESPIRATORY (INHALATION) at 19:17

## 2021-01-01 RX ADMIN — ENOXAPARIN SODIUM 90 MG: 100 INJECTION SUBCUTANEOUS at 20:28

## 2021-01-01 RX ADMIN — ALBUTEROL SULFATE 2 PUFF: 90 AEROSOL, METERED RESPIRATORY (INHALATION) at 20:13

## 2021-01-01 RX ADMIN — ZINC SULFATE 220 MG (50 MG) CAPSULE 220 MG: CAPSULE at 08:01

## 2021-01-01 RX ADMIN — ENOXAPARIN SODIUM 90 MG: 100 INJECTION SUBCUTANEOUS at 22:44

## 2021-01-01 RX ADMIN — Medication 1000 UNITS: at 08:17

## 2021-01-01 RX ADMIN — VASOPRESSIN 0.03 UNITS/MIN: 20 INJECTION INTRAVENOUS at 12:24

## 2021-01-01 RX ADMIN — PROPOFOL 30 MCG/KG/MIN: 10 INJECTION, EMULSION INTRAVENOUS at 05:00

## 2021-01-01 RX ADMIN — FENOFIBRATE 145 MG: 145 TABLET ORAL at 08:07

## 2021-01-01 RX ADMIN — DEXAMETHASONE 6 MG: 4 TABLET ORAL at 08:06

## 2021-01-01 RX ADMIN — SODIUM CHLORIDE 100 ML/HR: 4.5 INJECTION, SOLUTION INTRAVENOUS at 22:19

## 2021-01-01 RX ADMIN — SODIUM CHLORIDE, PRESERVATIVE FREE 10 ML: 5 INJECTION INTRAVENOUS at 20:31

## 2021-01-01 RX ADMIN — ALBUTEROL SULFATE 2 PUFF: 90 AEROSOL, METERED RESPIRATORY (INHALATION) at 19:20

## 2021-01-01 RX ADMIN — ACETAMINOPHEN 650 MG: 325 TABLET, FILM COATED ORAL at 00:23

## 2021-01-01 RX ADMIN — DEXTROSE MONOHYDRATE 50 ML: 500 INJECTION PARENTERAL at 01:50

## 2021-01-01 RX ADMIN — ZINC SULFATE 220 MG (50 MG) CAPSULE 220 MG: CAPSULE at 09:21

## 2021-01-01 RX ADMIN — INSULIN LISPRO 4 UNITS: 100 INJECTION, SOLUTION INTRAVENOUS; SUBCUTANEOUS at 23:57

## 2021-01-01 RX ADMIN — ENOXAPARIN SODIUM 80 MG: 100 INJECTION SUBCUTANEOUS at 08:14

## 2021-01-01 RX ADMIN — ALBUTEROL SULFATE 2 PUFF: 90 AEROSOL, METERED RESPIRATORY (INHALATION) at 06:52

## 2021-01-01 RX ADMIN — FENOFIBRATE 48 MG: 48 TABLET ORAL at 09:48

## 2021-01-01 RX ADMIN — TAZOBACTAM SODIUM AND PIPERACILLIN SODIUM 3.38 G: 375; 3 INJECTION, SOLUTION INTRAVENOUS at 18:02

## 2021-01-01 RX ADMIN — Medication 0.3 MCG/KG/MIN: at 08:32

## 2021-01-01 RX ADMIN — PROPOFOL 75 MCG/KG/MIN: 10 INJECTION, EMULSION INTRAVENOUS at 09:28

## 2021-01-01 RX ADMIN — Medication 3 MG: at 20:03

## 2021-01-01 RX ADMIN — APIXABAN 5 MG: 5 TABLET, FILM COATED ORAL at 09:21

## 2021-01-01 RX ADMIN — PROPOFOL 60 MCG/KG/MIN: 10 INJECTION, EMULSION INTRAVENOUS at 08:12

## 2021-01-01 RX ADMIN — BUDESONIDE AND FORMOTEROL FUMARATE DIHYDRATE 2 PUFF: 160; 4.5 AEROSOL RESPIRATORY (INHALATION) at 07:15

## 2021-01-01 RX ADMIN — SODIUM BICARBONATE 50 MEQ: 84 INJECTION INTRAVENOUS at 23:07

## 2021-01-01 RX ADMIN — ATORVASTATIN CALCIUM 10 MG: 10 TABLET, FILM COATED ORAL at 20:24

## 2021-01-01 RX ADMIN — FAMOTIDINE 20 MG: 20 TABLET, FILM COATED ORAL at 08:02

## 2021-01-01 RX ADMIN — GUAIFENESIN 200 MG: 100 SOLUTION ORAL at 09:47

## 2021-01-01 RX ADMIN — GUAIFENESIN 200 MG: 100 SOLUTION ORAL at 15:31

## 2021-01-01 RX ADMIN — BUDESONIDE AND FORMOTEROL FUMARATE DIHYDRATE 2 PUFF: 160; 4.5 AEROSOL RESPIRATORY (INHALATION) at 18:35

## 2021-01-01 RX ADMIN — DEXAMETHASONE SODIUM PHOSPHATE 6 MG: 4 INJECTION, SOLUTION INTRA-ARTICULAR; INTRALESIONAL; INTRAMUSCULAR; INTRAVENOUS; SOFT TISSUE at 21:01

## 2021-01-01 RX ADMIN — ZINC SULFATE 220 MG (50 MG) CAPSULE 220 MG: CAPSULE at 08:04

## 2021-01-01 RX ADMIN — ALBUTEROL SULFATE 2 PUFF: 90 AEROSOL, METERED RESPIRATORY (INHALATION) at 15:06

## 2021-01-01 RX ADMIN — SODIUM BICARBONATE 75 MEQ: 84 INJECTION INTRAVENOUS at 15:57

## 2021-01-01 RX ADMIN — INSULIN LISPRO 2 UNITS: 100 INJECTION, SOLUTION INTRAVENOUS; SUBCUTANEOUS at 12:44

## 2021-01-01 RX ADMIN — FENOFIBRATE 145 MG: 145 TABLET ORAL at 08:26

## 2021-01-01 RX ADMIN — FAMOTIDINE 20 MG: 20 TABLET, FILM COATED ORAL at 20:04

## 2021-01-01 RX ADMIN — ZINC SULFATE 220 MG (50 MG) CAPSULE 220 MG: CAPSULE at 08:02

## 2021-01-01 RX ADMIN — ATORVASTATIN CALCIUM 10 MG: 10 TABLET, FILM COATED ORAL at 20:30

## 2021-01-01 RX ADMIN — MINERAL OIL AND WHITE PETROLATUM: 150; 830 OINTMENT OPHTHALMIC at 07:59

## 2021-01-01 RX ADMIN — FAMOTIDINE 20 MG: 20 TABLET, FILM COATED ORAL at 08:41

## 2021-01-01 RX ADMIN — DOXYCYCLINE 100 MG: 100 TABLET, FILM COATED ORAL at 17:14

## 2021-01-01 RX ADMIN — SODIUM CHLORIDE 75 ML/HR: 4.5 INJECTION, SOLUTION INTRAVENOUS at 03:20

## 2021-01-01 RX ADMIN — CALCIUM CHLORIDE 1 G: 100 INJECTION INTRAVENOUS; INTRAVENTRICULAR at 10:32

## 2021-01-01 RX ADMIN — VASOPRESSIN 0.03 UNITS/MIN: 20 INJECTION INTRAVENOUS at 14:10

## 2021-01-01 RX ADMIN — TAZOBACTAM SODIUM AND PIPERACILLIN SODIUM 3.38 G: 375; 3 INJECTION, SOLUTION INTRAVENOUS at 11:46

## 2021-01-01 RX ADMIN — Medication 0.14 MCG/KG/MIN: at 05:20

## 2021-01-01 RX ADMIN — TAZOBACTAM SODIUM AND PIPERACILLIN SODIUM 3.38 G: 375; 3 INJECTION, SOLUTION INTRAVENOUS at 23:36

## 2021-01-01 RX ADMIN — FENTANYL CITRATE 150 MCG/HR: 50 INJECTION, SOLUTION INTRAMUSCULAR; INTRAVENOUS at 02:00

## 2021-01-01 RX ADMIN — SODIUM CHLORIDE 75 ML/HR: 9 INJECTION, SOLUTION INTRAVENOUS at 10:06

## 2021-01-01 RX ADMIN — BUDESONIDE AND FORMOTEROL FUMARATE DIHYDRATE 2 PUFF: 160; 4.5 AEROSOL RESPIRATORY (INHALATION) at 19:39

## 2021-01-01 RX ADMIN — FAMOTIDINE 20 MG: 20 TABLET, FILM COATED ORAL at 20:02

## 2021-01-01 RX ADMIN — Medication 0.02 MCG/KG/MIN: at 15:00

## 2021-01-01 RX ADMIN — MINERAL OIL AND WHITE PETROLATUM: 150; 830 OINTMENT OPHTHALMIC at 15:59

## 2021-01-01 RX ADMIN — Medication 1 TABLET: at 09:10

## 2021-01-01 RX ADMIN — ALBUTEROL SULFATE 2 PUFF: 90 AEROSOL, METERED RESPIRATORY (INHALATION) at 19:18

## 2021-01-01 RX ADMIN — GUAIFENESIN 200 MG: 100 SOLUTION ORAL at 16:19

## 2021-01-01 RX ADMIN — ALBUTEROL SULFATE 2 PUFF: 90 AEROSOL, METERED RESPIRATORY (INHALATION) at 08:30

## 2021-01-01 RX ADMIN — APIXABAN 5 MG: 5 TABLET, FILM COATED ORAL at 20:57

## 2021-01-01 RX ADMIN — INSULIN LISPRO 2 UNITS: 100 INJECTION, SOLUTION INTRAVENOUS; SUBCUTANEOUS at 17:44

## 2021-01-01 RX ADMIN — Medication 1000 UNITS: at 08:05

## 2021-01-01 RX ADMIN — PROPOFOL 35 MCG/KG/MIN: 10 INJECTION, EMULSION INTRAVENOUS at 11:49

## 2021-01-01 RX ADMIN — PROPOFOL 75 MCG/KG/MIN: 10 INJECTION, EMULSION INTRAVENOUS at 11:36

## 2021-01-01 RX ADMIN — FLUCONAZOLE IN SODIUM CHLORIDE 200 MG: 2 INJECTION, SOLUTION INTRAVENOUS at 13:43

## 2021-01-01 RX ADMIN — ALBUTEROL SULFATE 2 PUFF: 90 AEROSOL, METERED RESPIRATORY (INHALATION) at 07:15

## 2021-01-01 RX ADMIN — PROPOFOL 70 MCG/KG/MIN: 10 INJECTION, EMULSION INTRAVENOUS at 10:19

## 2021-01-01 RX ADMIN — SODIUM CHLORIDE 60 ML/HR: 9 INJECTION, SOLUTION INTRAVENOUS at 02:28

## 2021-01-01 RX ADMIN — DEXAMETHASONE SODIUM PHOSPHATE 6 MG: 4 INJECTION, SOLUTION INTRA-ARTICULAR; INTRALESIONAL; INTRAMUSCULAR; INTRAVENOUS; SOFT TISSUE at 20:32

## 2021-01-01 RX ADMIN — ENOXAPARIN SODIUM 90 MG: 100 INJECTION SUBCUTANEOUS at 20:09

## 2021-01-01 RX ADMIN — PROPOFOL 55 MCG/KG/MIN: 10 INJECTION, EMULSION INTRAVENOUS at 13:57

## 2021-01-01 RX ADMIN — INSULIN LISPRO 2 UNITS: 100 INJECTION, SOLUTION INTRAVENOUS; SUBCUTANEOUS at 11:36

## 2021-01-01 RX ADMIN — Medication 0.12 MCG/KG/MIN: at 13:08

## 2021-01-01 RX ADMIN — IOPAMIDOL 100 ML: 755 INJECTION, SOLUTION INTRAVENOUS at 20:28

## 2021-01-01 RX ADMIN — THIAMINE HCL TAB 100 MG 100 MG: 100 TAB at 09:10

## 2021-01-01 RX ADMIN — TAZOBACTAM SODIUM AND PIPERACILLIN SODIUM 4.5 G: 500; 4 INJECTION, SOLUTION INTRAVENOUS at 12:52

## 2021-01-01 RX ADMIN — Medication 3 MG: at 20:24

## 2021-01-01 RX ADMIN — ALBUTEROL SULFATE 2 PUFF: 90 AEROSOL, METERED RESPIRATORY (INHALATION) at 18:13

## 2021-01-01 RX ADMIN — LOSARTAN POTASSIUM 100 MG: 50 TABLET, FILM COATED ORAL at 08:41

## 2021-01-01 RX ADMIN — FENOFIBRATE 145 MG: 145 TABLET ORAL at 08:31

## 2021-01-01 RX ADMIN — SODIUM ZIRCONIUM CYCLOSILICATE 10 G: 10 POWDER, FOR SUSPENSION ORAL at 16:13

## 2021-01-01 RX ADMIN — PROPOFOL 40 MCG/KG/MIN: 10 INJECTION, EMULSION INTRAVENOUS at 18:22

## 2021-01-01 RX ADMIN — OXYCODONE HYDROCHLORIDE AND ACETAMINOPHEN 500 MG: 500 TABLET ORAL at 12:07

## 2021-01-01 RX ADMIN — BUDESONIDE AND FORMOTEROL FUMARATE DIHYDRATE 2 PUFF: 160; 4.5 AEROSOL RESPIRATORY (INHALATION) at 07:31

## 2021-01-01 RX ADMIN — Medication 0.12 MCG/KG/MIN: at 15:33

## 2021-01-01 RX ADMIN — TAZOBACTAM SODIUM AND PIPERACILLIN SODIUM 4.5 G: 500; 4 INJECTION, SOLUTION INTRAVENOUS at 12:46

## 2021-01-01 RX ADMIN — Medication 0.04 MCG/KG/MIN: at 05:09

## 2021-01-01 RX ADMIN — ALBUTEROL SULFATE 2 PUFF: 90 AEROSOL, METERED RESPIRATORY (INHALATION) at 10:59

## 2021-01-01 RX ADMIN — Medication 1 TABLET: at 09:47

## 2021-01-01 RX ADMIN — MINERAL OIL AND WHITE PETROLATUM: 150; 830 OINTMENT OPHTHALMIC at 18:28

## 2021-01-01 RX ADMIN — OXYCODONE HYDROCHLORIDE AND ACETAMINOPHEN 500 MG: 500 TABLET ORAL at 08:05

## 2021-01-01 RX ADMIN — ALBUTEROL SULFATE 2 PUFF: 90 AEROSOL, METERED RESPIRATORY (INHALATION) at 20:15

## 2021-01-01 RX ADMIN — PROPOFOL 70 MCG/KG/MIN: 10 INJECTION, EMULSION INTRAVENOUS at 20:41

## 2021-01-01 RX ADMIN — TAZOBACTAM SODIUM AND PIPERACILLIN SODIUM 4.5 G: 500; 4 INJECTION, SOLUTION INTRAVENOUS at 18:00

## 2021-01-01 RX ADMIN — INSULIN LISPRO 2 UNITS: 100 INJECTION, SOLUTION INTRAVENOUS; SUBCUTANEOUS at 05:45

## 2021-01-01 RX ADMIN — FAMOTIDINE 20 MG: 20 TABLET, FILM COATED ORAL at 08:07

## 2021-01-01 RX ADMIN — ALBUTEROL SULFATE 2 PUFF: 90 AEROSOL, METERED RESPIRATORY (INHALATION) at 10:28

## 2021-01-01 RX ADMIN — PROPOFOL 70 MCG/KG/MIN: 10 INJECTION, EMULSION INTRAVENOUS at 22:48

## 2021-01-01 RX ADMIN — FENTANYL CITRATE 150 MCG/HR: 50 INJECTION, SOLUTION INTRAMUSCULAR; INTRAVENOUS at 05:57

## 2021-01-01 RX ADMIN — ZINC SULFATE 220 MG (50 MG) CAPSULE 220 MG: CAPSULE at 08:16

## 2021-01-01 RX ADMIN — SODIUM ZIRCONIUM CYCLOSILICATE 10 G: 10 POWDER, FOR SUSPENSION ORAL at 08:19

## 2021-01-01 RX ADMIN — INSULIN LISPRO 4 UNITS: 100 INJECTION, SOLUTION INTRAVENOUS; SUBCUTANEOUS at 17:35

## 2021-01-01 RX ADMIN — FENOFIBRATE 48 MG: 48 TABLET ORAL at 08:06

## 2021-01-01 RX ADMIN — INSULIN LISPRO 2 UNITS: 100 INJECTION, SOLUTION INTRAVENOUS; SUBCUTANEOUS at 11:00

## 2021-01-01 RX ADMIN — FAMOTIDINE 20 MG: 20 TABLET, FILM COATED ORAL at 08:05

## 2021-01-01 RX ADMIN — MINERAL OIL AND WHITE PETROLATUM: 150; 830 OINTMENT OPHTHALMIC at 20:32

## 2021-01-01 RX ADMIN — ENOXAPARIN SODIUM 90 MG: 100 INJECTION SUBCUTANEOUS at 20:32

## 2021-01-01 RX ADMIN — INSULIN LISPRO 2 UNITS: 100 INJECTION, SOLUTION INTRAVENOUS; SUBCUTANEOUS at 12:59

## 2021-01-01 RX ADMIN — MINERAL OIL AND WHITE PETROLATUM: 150; 830 OINTMENT OPHTHALMIC at 10:51

## 2021-01-01 RX ADMIN — ZINC SULFATE 220 MG (50 MG) CAPSULE 220 MG: CAPSULE at 08:47

## 2021-01-01 RX ADMIN — ROCURONIUM BROMIDE 44.9 MG: 10 SOLUTION INTRAVENOUS at 14:47

## 2021-01-01 RX ADMIN — BUDESONIDE AND FORMOTEROL FUMARATE DIHYDRATE 2 PUFF: 160; 4.5 AEROSOL RESPIRATORY (INHALATION) at 18:31

## 2021-01-01 RX ADMIN — ALBUTEROL SULFATE 2 PUFF: 90 AEROSOL, METERED RESPIRATORY (INHALATION) at 02:45

## 2021-01-01 RX ADMIN — TAZOBACTAM SODIUM AND PIPERACILLIN SODIUM 4.5 G: 500; 4 INJECTION, SOLUTION INTRAVENOUS at 06:07

## 2021-01-01 RX ADMIN — FENTANYL CITRATE 100 MCG/HR: 50 INJECTION, SOLUTION INTRAMUSCULAR; INTRAVENOUS at 18:26

## 2021-01-01 RX ADMIN — THIAMINE HCL TAB 100 MG 100 MG: 100 TAB at 08:02

## 2021-01-01 RX ADMIN — Medication 1 TABLET: at 08:27

## 2021-01-01 RX ADMIN — ZINC SULFATE 220 MG (50 MG) CAPSULE 220 MG: CAPSULE at 08:05

## 2021-01-01 RX ADMIN — ALBUTEROL SULFATE 2 PUFF: 90 AEROSOL, METERED RESPIRATORY (INHALATION) at 23:12

## 2021-01-01 RX ADMIN — ZINC SULFATE 220 MG (50 MG) CAPSULE 220 MG: CAPSULE at 08:13

## 2021-01-01 RX ADMIN — ALBUTEROL SULFATE 2 PUFF: 90 AEROSOL, METERED RESPIRATORY (INHALATION) at 11:21

## 2021-01-01 RX ADMIN — PROPOFOL 65 MCG/KG/MIN: 10 INJECTION, EMULSION INTRAVENOUS at 23:22

## 2021-01-01 RX ADMIN — INSULIN LISPRO 2 UNITS: 100 INJECTION, SOLUTION INTRAVENOUS; SUBCUTANEOUS at 11:54

## 2021-01-01 RX ADMIN — PROPOFOL 50 MCG/KG/MIN: 10 INJECTION, EMULSION INTRAVENOUS at 15:32

## 2021-01-01 RX ADMIN — Medication 1000 UNITS: at 08:27

## 2021-01-01 RX ADMIN — GUAIFENESIN 200 MG: 100 SOLUTION ORAL at 18:39

## 2021-01-01 RX ADMIN — FENTANYL CITRATE 100 MCG: 50 INJECTION, SOLUTION INTRAMUSCULAR; INTRAVENOUS at 13:30

## 2021-01-01 RX ADMIN — BUDESONIDE AND FORMOTEROL FUMARATE DIHYDRATE 2 PUFF: 160; 4.5 AEROSOL RESPIRATORY (INHALATION) at 07:06

## 2021-01-01 RX ADMIN — Medication 0.3 MCG/KG/MIN: at 04:02

## 2021-01-01 RX ADMIN — PHENYLEPHRINE HYDROCHLORIDE 1.1 MCG/KG/MIN: 10 INJECTION INTRAVENOUS at 04:11

## 2021-01-01 RX ADMIN — FENOFIBRATE 145 MG: 145 TABLET ORAL at 08:06

## 2021-01-01 RX ADMIN — MINERAL OIL AND WHITE PETROLATUM: 150; 830 OINTMENT OPHTHALMIC at 15:04

## 2021-01-01 RX ADMIN — THIAMINE HCL TAB 100 MG 100 MG: 100 TAB at 08:01

## 2021-01-01 RX ADMIN — OXYCODONE HYDROCHLORIDE AND ACETAMINOPHEN 500 MG: 500 TABLET ORAL at 08:47

## 2021-01-01 RX ADMIN — ENOXAPARIN SODIUM 90 MG: 100 INJECTION SUBCUTANEOUS at 08:39

## 2021-01-01 RX ADMIN — ENOXAPARIN SODIUM 90 MG: 100 INJECTION SUBCUTANEOUS at 08:07

## 2021-01-01 RX ADMIN — TAZOBACTAM SODIUM AND PIPERACILLIN SODIUM 4.5 G: 500; 4 INJECTION, SOLUTION INTRAVENOUS at 17:04

## 2021-01-01 RX ADMIN — PROPOFOL 65 MCG/KG/MIN: 10 INJECTION, EMULSION INTRAVENOUS at 03:19

## 2021-01-01 RX ADMIN — Medication 1000 UNITS: at 09:10

## 2021-01-01 RX ADMIN — MINERAL OIL AND WHITE PETROLATUM: 150; 830 OINTMENT OPHTHALMIC at 11:04

## 2021-01-01 RX ADMIN — INSULIN HUMAN 10 UNITS: 100 INJECTION, SOLUTION PARENTERAL at 23:19

## 2021-01-01 RX ADMIN — ALBUTEROL SULFATE 2 PUFF: 90 AEROSOL, METERED RESPIRATORY (INHALATION) at 03:36

## 2021-01-01 RX ADMIN — DOXYCYCLINE 100 MG: 100 TABLET, FILM COATED ORAL at 17:46

## 2021-01-01 RX ADMIN — Medication 3 MG: at 20:43

## 2021-01-01 RX ADMIN — Medication 0.3 MCG/KG/MIN: at 17:48

## 2021-01-01 RX ADMIN — SODIUM BICARBONATE 75 MEQ: 84 INJECTION INTRAVENOUS at 18:30

## 2021-01-01 RX ADMIN — ALBUTEROL SULFATE 2 PUFF: 90 AEROSOL, METERED RESPIRATORY (INHALATION) at 22:29

## 2021-01-01 RX ADMIN — INSULIN HUMAN 10 UNITS: 100 INJECTION, SOLUTION PARENTERAL at 08:38

## 2021-01-01 RX ADMIN — FAMOTIDINE 20 MG: 20 TABLET, FILM COATED ORAL at 08:17

## 2021-01-01 RX ADMIN — SODIUM ZIRCONIUM CYCLOSILICATE 10 G: 10 POWDER, FOR SUSPENSION ORAL at 16:03

## 2021-01-01 RX ADMIN — Medication 0.18 MCG/KG/MIN: at 06:08

## 2021-01-01 RX ADMIN — MINERAL OIL AND WHITE PETROLATUM: 150; 830 OINTMENT OPHTHALMIC at 04:02

## 2021-01-01 RX ADMIN — CALCIUM CHLORIDE 1 G: 100 INJECTION INTRAVENOUS; INTRAVENTRICULAR at 12:35

## 2021-01-01 RX ADMIN — ENOXAPARIN SODIUM 90 MG: 100 INJECTION SUBCUTANEOUS at 08:12

## 2021-01-01 RX ADMIN — BUDESONIDE AND FORMOTEROL FUMARATE DIHYDRATE 2 PUFF: 160; 4.5 AEROSOL RESPIRATORY (INHALATION) at 18:28

## 2021-01-01 RX ADMIN — MINERAL OIL AND WHITE PETROLATUM: 150; 830 OINTMENT OPHTHALMIC at 22:29

## 2021-01-01 RX ADMIN — DEXAMETHASONE 6 MG: 4 TABLET ORAL at 09:47

## 2021-01-01 RX ADMIN — VASOPRESSIN 0.03 UNITS/MIN: 20 INJECTION INTRAVENOUS at 16:22

## 2021-01-01 RX ADMIN — OXYCODONE HYDROCHLORIDE AND ACETAMINOPHEN 500 MG: 500 TABLET ORAL at 08:41

## 2021-01-01 RX ADMIN — ENOXAPARIN SODIUM 90 MG: 100 INJECTION SUBCUTANEOUS at 08:04

## 2021-01-01 RX ADMIN — ENOXAPARIN SODIUM 90 MG: 100 INJECTION SUBCUTANEOUS at 20:02

## 2021-01-01 RX ADMIN — INSULIN LISPRO 6 UNITS: 100 INJECTION, SOLUTION INTRAVENOUS; SUBCUTANEOUS at 18:33

## 2021-01-01 RX ADMIN — GUAIFENESIN 200 MG: 100 SOLUTION ORAL at 15:09

## 2021-01-01 RX ADMIN — LOSARTAN POTASSIUM 100 MG: 50 TABLET, FILM COATED ORAL at 08:26

## 2021-01-01 RX ADMIN — ALBUTEROL SULFATE 2 PUFF: 90 AEROSOL, METERED RESPIRATORY (INHALATION) at 03:29

## 2021-01-01 RX ADMIN — ALBUTEROL SULFATE 2 PUFF: 90 AEROSOL, METERED RESPIRATORY (INHALATION) at 03:42

## 2021-01-01 RX ADMIN — ACETAMINOPHEN 650 MG: 325 TABLET, FILM COATED ORAL at 09:26

## 2021-01-01 RX ADMIN — ALBUTEROL SULFATE 2 PUFF: 90 AEROSOL, METERED RESPIRATORY (INHALATION) at 07:28

## 2021-01-01 RX ADMIN — PROPOFOL 60 MCG/KG/MIN: 10 INJECTION, EMULSION INTRAVENOUS at 15:59

## 2021-01-01 RX ADMIN — SODIUM ZIRCONIUM CYCLOSILICATE 10 G: 10 POWDER, FOR SUSPENSION ORAL at 20:12

## 2021-01-01 RX ADMIN — THIAMINE HCL TAB 100 MG 100 MG: 100 TAB at 08:05

## 2021-01-01 RX ADMIN — ALBUTEROL SULFATE 2 PUFF: 90 AEROSOL, METERED RESPIRATORY (INHALATION) at 07:21

## 2021-01-01 RX ADMIN — INSULIN LISPRO 2 UNITS: 100 INJECTION, SOLUTION INTRAVENOUS; SUBCUTANEOUS at 17:42

## 2021-01-01 RX ADMIN — TAZOBACTAM SODIUM AND PIPERACILLIN SODIUM 4.5 G: 500; 4 INJECTION, SOLUTION INTRAVENOUS at 18:29

## 2021-01-01 RX ADMIN — FAMOTIDINE 20 MG: 20 TABLET, FILM COATED ORAL at 09:11

## 2021-01-01 RX ADMIN — PHENYLEPHRINE HYDROCHLORIDE 3 MCG/KG/MIN: 10 INJECTION INTRAVENOUS at 15:20

## 2021-01-01 RX ADMIN — ALBUTEROL SULFATE 2 PUFF: 90 AEROSOL, METERED RESPIRATORY (INHALATION) at 06:58

## 2021-01-01 RX ADMIN — SODIUM CHLORIDE 75 ML/HR: 4.5 INJECTION, SOLUTION INTRAVENOUS at 06:12

## 2021-01-01 RX ADMIN — PROPOFOL 70 MCG/KG/MIN: 10 INJECTION, EMULSION INTRAVENOUS at 03:21

## 2021-01-01 RX ADMIN — POLYETHYLENE GLYCOL 3350 17 G: 17 POWDER, FOR SOLUTION ORAL at 20:37

## 2021-01-01 RX ADMIN — ALBUTEROL SULFATE 2 PUFF: 90 AEROSOL, METERED RESPIRATORY (INHALATION) at 03:06

## 2021-01-01 RX ADMIN — Medication 3 PACKET: at 16:15

## 2021-01-01 RX ADMIN — PROPOFOL 65 MCG/KG/MIN: 10 INJECTION, EMULSION INTRAVENOUS at 22:29

## 2021-01-01 RX ADMIN — INSULIN LISPRO 4 UNITS: 100 INJECTION, SOLUTION INTRAVENOUS; SUBCUTANEOUS at 06:10

## 2021-01-01 RX ADMIN — FAMOTIDINE 20 MG: 20 TABLET, FILM COATED ORAL at 08:47

## 2021-01-01 RX ADMIN — ALBUTEROL SULFATE 2 PUFF: 90 AEROSOL, METERED RESPIRATORY (INHALATION) at 02:56

## 2021-01-01 RX ADMIN — ALBUTEROL SULFATE 2 PUFF: 90 AEROSOL, METERED RESPIRATORY (INHALATION) at 17:08

## 2021-01-01 RX ADMIN — ZINC SULFATE 220 MG (50 MG) CAPSULE 220 MG: CAPSULE at 08:27

## 2021-01-01 RX ADMIN — Medication 1 TABLET: at 08:12

## 2021-01-01 RX ADMIN — INSULIN LISPRO 4 UNITS: 100 INJECTION, SOLUTION INTRAVENOUS; SUBCUTANEOUS at 05:28

## 2021-01-01 RX ADMIN — Medication 3 MG: at 20:31

## 2021-01-01 RX ADMIN — INSULIN LISPRO 2 UNITS: 100 INJECTION, SOLUTION INTRAVENOUS; SUBCUTANEOUS at 17:02

## 2021-01-01 RX ADMIN — SODIUM POLYSTYRENE SULFONATE 30 G: 15 SUSPENSION ORAL; RECTAL at 06:28

## 2021-01-01 RX ADMIN — BENZONATATE 200 MG: 100 CAPSULE ORAL at 08:28

## 2021-01-01 RX ADMIN — PROPOFOL 65 MCG/KG/MIN: 10 INJECTION, EMULSION INTRAVENOUS at 01:43

## 2021-01-01 RX ADMIN — PROPOFOL 40 MCG/KG/MIN: 10 INJECTION, EMULSION INTRAVENOUS at 09:08

## 2021-01-01 RX ADMIN — SODIUM CHLORIDE, PRESERVATIVE FREE 10 ML: 5 INJECTION INTRAVENOUS at 08:08

## 2021-01-01 RX ADMIN — MINERAL OIL AND WHITE PETROLATUM: 150; 830 OINTMENT OPHTHALMIC at 16:19

## 2021-01-01 RX ADMIN — SODIUM ZIRCONIUM CYCLOSILICATE 10 G: 10 POWDER, FOR SUSPENSION ORAL at 15:44

## 2021-01-01 RX ADMIN — MINERAL OIL AND WHITE PETROLATUM: 150; 830 OINTMENT OPHTHALMIC at 15:34

## 2021-01-01 RX ADMIN — DEXTROMETHORPHAN 60 MG: 30 SUSPENSION, EXTENDED RELEASE ORAL at 05:29

## 2021-01-01 RX ADMIN — Medication 0.3 MCG/KG/MIN: at 04:39

## 2021-01-01 RX ADMIN — ZINC SULFATE 220 MG (50 MG) CAPSULE 220 MG: CAPSULE at 08:52

## 2021-01-01 RX ADMIN — ENOXAPARIN SODIUM 90 MG: 100 INJECTION SUBCUTANEOUS at 20:16

## 2021-01-01 RX ADMIN — Medication 1 TABLET: at 08:41

## 2021-01-01 RX ADMIN — FENTANYL CITRATE 150 MCG/HR: 50 INJECTION, SOLUTION INTRAMUSCULAR; INTRAVENOUS at 16:10

## 2021-01-01 RX ADMIN — INSULIN LISPRO 2 UNITS: 100 INJECTION, SOLUTION INTRAVENOUS; SUBCUTANEOUS at 06:13

## 2021-01-01 RX ADMIN — ALBUTEROL SULFATE 2 PUFF: 90 AEROSOL, METERED RESPIRATORY (INHALATION) at 10:27

## 2021-01-01 RX ADMIN — CALCIUM GLUCONATE 1 G: 98 INJECTION, SOLUTION INTRAVENOUS at 08:14

## 2021-01-01 RX ADMIN — MINERAL OIL AND WHITE PETROLATUM: 150; 830 OINTMENT OPHTHALMIC at 18:00

## 2021-01-01 RX ADMIN — SODIUM CHLORIDE, PRESERVATIVE FREE 10 ML: 5 INJECTION INTRAVENOUS at 20:34

## 2021-01-01 RX ADMIN — ENOXAPARIN SODIUM 80 MG: 100 INJECTION SUBCUTANEOUS at 09:47

## 2021-01-01 RX ADMIN — FAMOTIDINE 20 MG: 20 TABLET, FILM COATED ORAL at 20:30

## 2021-01-01 RX ADMIN — ALBUTEROL SULFATE 2 PUFF: 90 AEROSOL, METERED RESPIRATORY (INHALATION) at 14:25

## 2021-01-01 RX ADMIN — SODIUM CHLORIDE, PRESERVATIVE FREE 10 ML: 5 INJECTION INTRAVENOUS at 20:33

## 2021-01-01 RX ADMIN — SODIUM CHLORIDE 75 ML/HR: 4.5 INJECTION, SOLUTION INTRAVENOUS at 09:30

## 2021-01-01 RX ADMIN — TAZOBACTAM SODIUM AND PIPERACILLIN SODIUM 4.5 G: 500; 4 INJECTION, SOLUTION INTRAVENOUS at 11:59

## 2021-01-01 RX ADMIN — ALBUTEROL SULFATE 2 PUFF: 90 AEROSOL, METERED RESPIRATORY (INHALATION) at 10:47

## 2021-01-01 RX ADMIN — DEXTROMETHORPHAN 60 MG: 30 SUSPENSION, EXTENDED RELEASE ORAL at 20:11

## 2021-01-01 RX ADMIN — MINERAL OIL AND WHITE PETROLATUM: 150; 830 OINTMENT OPHTHALMIC at 18:14

## 2021-01-01 RX ADMIN — PROPOFOL 60 MCG/KG/MIN: 10 INJECTION, EMULSION INTRAVENOUS at 05:42

## 2021-01-01 RX ADMIN — MINERAL OIL AND WHITE PETROLATUM: 150; 830 OINTMENT OPHTHALMIC at 15:25

## 2021-01-01 RX ADMIN — Medication 0.02 MCG/KG/MIN: at 07:55

## 2021-01-01 RX ADMIN — BUDESONIDE AND FORMOTEROL FUMARATE DIHYDRATE 2 PUFF: 160; 4.5 AEROSOL RESPIRATORY (INHALATION) at 06:45

## 2021-01-01 RX ADMIN — PROPOFOL 75 MCG/KG/MIN: 10 INJECTION, EMULSION INTRAVENOUS at 18:00

## 2021-01-01 RX ADMIN — Medication 1000 UNITS: at 08:14

## 2021-01-01 RX ADMIN — Medication 0.06 MCG/KG/MIN: at 12:45

## 2021-01-01 RX ADMIN — DEXAMETHASONE SODIUM PHOSPHATE 6 MG: 4 INJECTION, SOLUTION INTRA-ARTICULAR; INTRALESIONAL; INTRAMUSCULAR; INTRAVENOUS; SOFT TISSUE at 09:10

## 2021-01-01 RX ADMIN — DOXYCYCLINE 100 MG: 100 TABLET, FILM COATED ORAL at 06:39

## 2021-01-01 RX ADMIN — BENZONATATE 200 MG: 100 CAPSULE ORAL at 21:52

## 2021-01-01 RX ADMIN — SODIUM CHLORIDE 75 ML/HR: 4.5 INJECTION, SOLUTION INTRAVENOUS at 08:32

## 2021-01-01 RX ADMIN — ATORVASTATIN CALCIUM 10 MG: 10 TABLET, FILM COATED ORAL at 20:31

## 2021-01-01 RX ADMIN — MINERAL OIL AND WHITE PETROLATUM: 150; 830 OINTMENT OPHTHALMIC at 20:08

## 2021-01-01 RX ADMIN — SODIUM CHLORIDE 75 ML/HR: 4.5 INJECTION, SOLUTION INTRAVENOUS at 09:22

## 2021-01-01 RX ADMIN — PROPOFOL 40 MCG/KG/MIN: 10 INJECTION, EMULSION INTRAVENOUS at 02:28

## 2021-01-01 RX ADMIN — MEROPENEM 1 G: 1 INJECTION, POWDER, FOR SOLUTION INTRAVENOUS at 06:02

## 2021-01-01 RX ADMIN — TAZOBACTAM SODIUM AND PIPERACILLIN SODIUM 4.5 G: 500; 4 INJECTION, SOLUTION INTRAVENOUS at 06:26

## 2021-01-01 RX ADMIN — ONDANSETRON 4 MG: 2 INJECTION INTRAMUSCULAR; INTRAVENOUS at 20:41

## 2021-01-01 RX ADMIN — GUAIFENESIN 200 MG: 100 SOLUTION ORAL at 20:25

## 2021-01-01 RX ADMIN — BUDESONIDE AND FORMOTEROL FUMARATE DIHYDRATE 2 PUFF: 160; 4.5 AEROSOL RESPIRATORY (INHALATION) at 07:29

## 2021-01-01 RX ADMIN — ALBUTEROL SULFATE 2 PUFF: 90 AEROSOL, METERED RESPIRATORY (INHALATION) at 18:28

## 2021-01-01 RX ADMIN — ALBUTEROL SULFATE 2 PUFF: 90 AEROSOL, METERED RESPIRATORY (INHALATION) at 13:48

## 2021-01-01 RX ADMIN — ALBUTEROL SULFATE 2 PUFF: 90 AEROSOL, METERED RESPIRATORY (INHALATION) at 14:04

## 2021-01-01 RX ADMIN — MINERAL OIL AND WHITE PETROLATUM: 150; 830 OINTMENT OPHTHALMIC at 02:44

## 2021-01-01 RX ADMIN — ALBUTEROL SULFATE 2 PUFF: 90 AEROSOL, METERED RESPIRATORY (INHALATION) at 23:25

## 2021-01-01 RX ADMIN — Medication 1000 UNITS: at 08:41

## 2021-01-01 RX ADMIN — Medication 3 PACKET: at 08:21

## 2021-01-01 RX ADMIN — Medication 1 TABLET: at 08:52

## 2021-01-01 RX ADMIN — ALBUTEROL SULFATE 2 PUFF: 90 AEROSOL, METERED RESPIRATORY (INHALATION) at 07:19

## 2021-01-01 RX ADMIN — SODIUM CHLORIDE, PRESERVATIVE FREE 10 ML: 5 INJECTION INTRAVENOUS at 08:28

## 2021-01-01 RX ADMIN — MINERAL OIL AND WHITE PETROLATUM: 150; 830 OINTMENT OPHTHALMIC at 07:11

## 2021-01-01 RX ADMIN — ALBUTEROL SULFATE 2 PUFF: 90 AEROSOL, METERED RESPIRATORY (INHALATION) at 23:20

## 2021-01-01 RX ADMIN — DEXAMETHASONE 6 MG: 4 TABLET ORAL at 08:16

## 2021-01-01 RX ADMIN — GUAIFENESIN 200 MG: 100 SOLUTION ORAL at 17:52

## 2021-01-01 RX ADMIN — DEXTROMETHORPHAN 60 MG: 30 SUSPENSION, EXTENDED RELEASE ORAL at 18:57

## 2021-01-01 RX ADMIN — PROPOFOL 65 MCG/KG/MIN: 10 INJECTION, EMULSION INTRAVENOUS at 12:48

## 2021-01-01 RX ADMIN — ZINC SULFATE 220 MG (50 MG) CAPSULE 220 MG: CAPSULE at 08:12

## 2021-01-01 RX ADMIN — SODIUM ZIRCONIUM CYCLOSILICATE 10 G: 10 POWDER, FOR SUSPENSION ORAL at 08:07

## 2021-01-01 RX ADMIN — MEROPENEM 1 G: 1 INJECTION, POWDER, FOR SOLUTION INTRAVENOUS at 11:34

## 2021-01-01 RX ADMIN — SODIUM CHLORIDE, PRESERVATIVE FREE 10 ML: 5 INJECTION INTRAVENOUS at 09:05

## 2021-01-01 RX ADMIN — MINERAL OIL AND WHITE PETROLATUM: 150; 830 OINTMENT OPHTHALMIC at 20:02

## 2021-01-01 RX ADMIN — Medication 1000 UNITS: at 12:07

## 2021-01-01 RX ADMIN — PROPOFOL 70 MCG/KG/MIN: 10 INJECTION, EMULSION INTRAVENOUS at 12:01

## 2021-01-01 RX ADMIN — SODIUM CHLORIDE 75 ML/HR: 4.5 INJECTION, SOLUTION INTRAVENOUS at 13:08

## 2021-01-01 RX ADMIN — MINERAL OIL AND WHITE PETROLATUM: 150; 830 OINTMENT OPHTHALMIC at 00:02

## 2021-01-01 RX ADMIN — PROPOFOL 75 MCG/KG/MIN: 10 INJECTION, EMULSION INTRAVENOUS at 08:03

## 2021-01-01 RX ADMIN — Medication 3 PACKET: at 09:47

## 2021-01-01 RX ADMIN — BUDESONIDE AND FORMOTEROL FUMARATE DIHYDRATE 2 PUFF: 160; 4.5 AEROSOL RESPIRATORY (INHALATION) at 20:08

## 2021-01-01 RX ADMIN — SODIUM BICARBONATE 50 MEQ: 84 INJECTION INTRAVENOUS at 08:40

## 2021-01-01 RX ADMIN — MINERAL OIL AND WHITE PETROLATUM: 150; 830 OINTMENT OPHTHALMIC at 06:26

## 2021-01-01 RX ADMIN — ALBUTEROL SULFATE 2 PUFF: 90 AEROSOL, METERED RESPIRATORY (INHALATION) at 07:29

## 2021-01-01 RX ADMIN — PROPOFOL 35 MCG/KG/MIN: 10 INJECTION, EMULSION INTRAVENOUS at 06:28

## 2021-01-01 RX ADMIN — SODIUM BICARBONATE 75 MEQ: 84 INJECTION INTRAVENOUS at 08:12

## 2021-01-01 RX ADMIN — PROPOFOL 70 MCG/KG/MIN: 10 INJECTION, EMULSION INTRAVENOUS at 17:37

## 2021-01-01 RX ADMIN — MINERAL OIL AND WHITE PETROLATUM: 150; 830 OINTMENT OPHTHALMIC at 03:42

## 2021-01-01 RX ADMIN — SODIUM CHLORIDE 60 ML/HR: 9 INJECTION, SOLUTION INTRAVENOUS at 09:35

## 2021-01-01 RX ADMIN — FAMOTIDINE 20 MG: 20 TABLET, FILM COATED ORAL at 20:24

## 2021-01-01 RX ADMIN — Medication 0.14 MCG/KG/MIN: at 07:51

## 2021-01-01 RX ADMIN — ALBUTEROL SULFATE 2 PUFF: 90 AEROSOL, METERED RESPIRATORY (INHALATION) at 03:01

## 2021-01-01 RX ADMIN — MINERAL OIL AND WHITE PETROLATUM: 150; 830 OINTMENT OPHTHALMIC at 06:05

## 2021-01-01 RX ADMIN — PROPOFOL 60 MCG/KG/MIN: 10 INJECTION, EMULSION INTRAVENOUS at 13:46

## 2021-01-01 RX ADMIN — ATORVASTATIN CALCIUM 10 MG: 10 TABLET, FILM COATED ORAL at 20:28

## 2021-01-01 RX ADMIN — GUAIFENESIN 200 MG: 100 SOLUTION ORAL at 09:10

## 2021-01-01 RX ADMIN — THIAMINE HCL TAB 100 MG 100 MG: 100 TAB at 08:26

## 2021-01-01 RX ADMIN — Medication 1 TABLET: at 08:47

## 2021-01-01 RX ADMIN — OXYCODONE HYDROCHLORIDE AND ACETAMINOPHEN 500 MG: 500 TABLET ORAL at 08:08

## 2021-01-01 RX ADMIN — PROPOFOL 65 MCG/KG/MIN: 10 INJECTION, EMULSION INTRAVENOUS at 23:42

## 2021-01-01 RX ADMIN — VASOPRESSIN 0.03 UNITS/MIN: 20 INJECTION INTRAVENOUS at 02:08

## 2021-01-01 RX ADMIN — GUAIFENESIN 200 MG: 100 SOLUTION ORAL at 20:04

## 2021-01-01 RX ADMIN — FENOFIBRATE 145 MG: 145 TABLET ORAL at 08:52

## 2021-01-01 RX ADMIN — PROPOFOL 70 MCG/KG/MIN: 10 INJECTION, EMULSION INTRAVENOUS at 09:22

## 2021-01-01 RX ADMIN — PROPOFOL 70 MCG/KG/MIN: 10 INJECTION, EMULSION INTRAVENOUS at 05:57

## 2021-01-01 RX ADMIN — ALBUTEROL SULFATE 2 PUFF: 90 AEROSOL, METERED RESPIRATORY (INHALATION) at 18:48

## 2021-01-01 RX ADMIN — ALBUTEROL SULFATE 10 MG: 2.5 SOLUTION RESPIRATORY (INHALATION) at 02:10

## 2021-01-01 RX ADMIN — MINERAL OIL AND WHITE PETROLATUM: 150; 830 OINTMENT OPHTHALMIC at 10:00

## 2021-01-01 RX ADMIN — FENTANYL CITRATE 50 MCG/HR: 50 INJECTION, SOLUTION INTRAMUSCULAR; INTRAVENOUS at 05:15

## 2021-01-01 RX ADMIN — INSULIN HUMAN 10 UNITS: 100 INJECTION, SOLUTION PARENTERAL at 10:31

## 2021-01-01 RX ADMIN — PROPOFOL 50 MCG/KG/MIN: 10 INJECTION, EMULSION INTRAVENOUS at 01:43

## 2021-01-01 RX ADMIN — GUAIFENESIN 200 MG: 100 SOLUTION ORAL at 20:30

## 2021-01-01 RX ADMIN — FENTANYL CITRATE 150 MCG/HR: 50 INJECTION, SOLUTION INTRAMUSCULAR; INTRAVENOUS at 14:04

## 2021-01-01 RX ADMIN — DEXAMETHASONE 6 MG: 4 TABLET ORAL at 08:07

## 2021-01-01 RX ADMIN — PROPOFOL 65 MCG/KG/MIN: 10 INJECTION, EMULSION INTRAVENOUS at 07:26

## 2021-01-01 RX ADMIN — PROPOFOL 70 MCG/KG/MIN: 10 INJECTION, EMULSION INTRAVENOUS at 19:51

## 2021-01-01 RX ADMIN — GUAIFENESIN 200 MG: 100 SOLUTION ORAL at 08:07

## 2021-01-01 RX ADMIN — DEXTROMETHORPHAN 60 MG: 30 SUSPENSION, EXTENDED RELEASE ORAL at 20:57

## 2021-01-01 RX ADMIN — GUAIFENESIN 200 MG: 100 SOLUTION ORAL at 20:12

## 2021-01-01 RX ADMIN — DEXAMETHASONE SODIUM PHOSPHATE 6 MG: 4 INJECTION, SOLUTION INTRA-ARTICULAR; INTRALESIONAL; INTRAMUSCULAR; INTRAVENOUS; SOFT TISSUE at 20:02

## 2021-01-01 RX ADMIN — SODIUM ZIRCONIUM CYCLOSILICATE 10 G: 10 POWDER, FOR SUSPENSION ORAL at 09:12

## 2021-01-01 RX ADMIN — Medication 0.02 MCG/KG/MIN: at 18:29

## 2021-01-01 RX ADMIN — TAZOBACTAM SODIUM AND PIPERACILLIN SODIUM 3.38 G: 375; 3 INJECTION, SOLUTION INTRAVENOUS at 02:02

## 2021-01-01 RX ADMIN — ALBUTEROL SULFATE 2 PUFF: 90 AEROSOL, METERED RESPIRATORY (INHALATION) at 08:12

## 2021-01-01 RX ADMIN — Medication 3 PACKET: at 16:37

## 2021-01-01 RX ADMIN — ALBUTEROL SULFATE 2 PUFF: 90 AEROSOL, METERED RESPIRATORY (INHALATION) at 10:49

## 2021-01-01 RX ADMIN — DEXAMETHASONE 6 MG: 4 TABLET ORAL at 08:14

## 2021-01-01 RX ADMIN — SODIUM ZIRCONIUM CYCLOSILICATE 10 G: 10 POWDER, FOR SUSPENSION ORAL at 12:55

## 2021-01-01 RX ADMIN — REMDESIVIR 200 MG: 100 INJECTION, POWDER, LYOPHILIZED, FOR SOLUTION INTRAVENOUS at 02:34

## 2021-01-01 RX ADMIN — PROPOFOL 75 MCG/KG/MIN: 10 INJECTION, EMULSION INTRAVENOUS at 20:45

## 2021-01-01 RX ADMIN — FAMOTIDINE 20 MG: 20 TABLET, FILM COATED ORAL at 21:52

## 2021-01-01 RX ADMIN — DEXAMETHASONE SODIUM PHOSPHATE 6 MG: 10 INJECTION, SOLUTION INTRAMUSCULAR; INTRAVENOUS at 16:03

## 2021-01-01 RX ADMIN — PHENYLEPHRINE HYDROCHLORIDE 0.5 MCG/KG/MIN: 10 INJECTION INTRAVENOUS at 18:28

## 2021-01-01 RX ADMIN — Medication 1000 UNITS: at 08:47

## 2021-01-01 RX ADMIN — BENZONATATE 200 MG: 100 CAPSULE ORAL at 06:54

## 2021-01-01 RX ADMIN — FENTANYL CITRATE 250 MCG/HR: 50 INJECTION, SOLUTION INTRAMUSCULAR; INTRAVENOUS at 14:32

## 2021-01-01 RX ADMIN — DEXAMETHASONE 6 MG: 4 TABLET ORAL at 08:03

## 2021-01-01 RX ADMIN — ACETAMINOPHEN 650 MG: 325 TABLET, FILM COATED ORAL at 15:06

## 2021-01-01 RX ADMIN — FAMOTIDINE 20 MG: 20 TABLET, FILM COATED ORAL at 08:01

## 2021-01-01 RX ADMIN — BUDESONIDE AND FORMOTEROL FUMARATE DIHYDRATE 2 PUFF: 160; 4.5 AEROSOL RESPIRATORY (INHALATION) at 08:44

## 2021-01-01 RX ADMIN — GUAIFENESIN 200 MG: 100 SOLUTION ORAL at 20:00

## 2021-01-01 RX ADMIN — THIAMINE HCL TAB 100 MG 100 MG: 100 TAB at 09:48

## 2021-01-01 RX ADMIN — ATORVASTATIN CALCIUM 10 MG: 10 TABLET, FILM COATED ORAL at 20:11

## 2021-01-01 RX ADMIN — POLYETHYLENE GLYCOL 3350 17 G: 17 POWDER, FOR SOLUTION ORAL at 08:11

## 2021-01-01 RX ADMIN — PROPOFOL 60 MCG/KG/MIN: 10 INJECTION, EMULSION INTRAVENOUS at 18:01

## 2021-01-01 RX ADMIN — MINERAL OIL AND WHITE PETROLATUM: 150; 830 OINTMENT OPHTHALMIC at 06:29

## 2021-01-01 RX ADMIN — ALBUTEROL SULFATE 2 PUFF: 90 AEROSOL, METERED RESPIRATORY (INHALATION) at 10:46

## 2021-01-01 RX ADMIN — INSULIN LISPRO 4 UNITS: 100 INJECTION, SOLUTION INTRAVENOUS; SUBCUTANEOUS at 18:02

## 2021-01-01 RX ADMIN — DEXTROSE MONOHYDRATE 50 ML: 500 INJECTION PARENTERAL at 23:06

## 2021-01-01 RX ADMIN — MEROPENEM 500 MG: 500 INJECTION, POWDER, FOR SOLUTION INTRAVENOUS at 17:30

## 2021-01-01 RX ADMIN — INSULIN LISPRO 2 UNITS: 100 INJECTION, SOLUTION INTRAVENOUS; SUBCUTANEOUS at 00:37

## 2021-01-01 RX ADMIN — GUAIFENESIN 200 MG: 100 SOLUTION ORAL at 16:15

## 2021-01-01 RX ADMIN — VASOPRESSIN 0.03 UNITS/MIN: 20 INJECTION INTRAVENOUS at 03:42

## 2021-03-10 ENCOUNTER — IMMUNIZATION (OUTPATIENT)
Age: 71
End: 2021-03-10
Payer: MEDICARE

## 2021-03-10 PROCEDURE — 91300 COVID-19, PFIZER VACCINE 30MCG/0.3ML DOSE: CPT | Performed by: FAMILY MEDICINE

## 2021-03-10 PROCEDURE — 0001A COVID-19, PFIZER VACCINE 30MCG/0.3ML DOSE: CPT | Performed by: FAMILY MEDICINE

## 2021-03-31 ENCOUNTER — IMMUNIZATION (OUTPATIENT)
Age: 71
End: 2021-03-31
Payer: MEDICARE

## 2021-03-31 PROCEDURE — 0002A PR IMM ADMN SARSCOV2 30MCG/0.3ML DIL RECON 2ND DOSE: CPT | Performed by: FAMILY MEDICINE

## 2021-03-31 PROCEDURE — 91300 COVID-19, PFIZER VACCINE 30MCG/0.3ML DOSE: CPT | Performed by: FAMILY MEDICINE

## 2021-11-15 PROBLEM — J12.82 PNEUMONIA DUE TO COVID-19 VIRUS: Status: ACTIVE | Noted: 2021-01-01

## 2021-11-15 PROBLEM — U07.1 PNEUMONIA DUE TO COVID-19 VIRUS: Status: ACTIVE | Noted: 2021-01-01

## 2021-11-16 PROBLEM — Z86.711 HISTORY OF PULMONARY EMBOLISM: Status: ACTIVE | Noted: 2021-01-01

## 2021-11-16 PROBLEM — J96.01 ACUTE RESPIRATORY FAILURE WITH HYPOXIA (HCC): Status: ACTIVE | Noted: 2021-01-01

## 2021-11-16 PROBLEM — R73.9 HYPERGLYCEMIA: Status: ACTIVE | Noted: 2021-01-01

## 2021-12-01 NOTE — PROGRESS NOTES
PULMONARY AND CRITICAL CARE PROGRESS NOTE - [facility]    Patient: Carlton Ritter    1950    MR# [id number]    Acct# [id number]  12/01/21   07:15 CST  Referring Provider: Julito Main MD    Chief Complaint: Mechanically ventilated    Interval history: The patient is Covid positive and has been examined through the glass doors to prevent possible cross-contamination, unnecessary exposure and unnecessary use of PPE. The patient remains sedated and intubated with propofol, fentanyl, and Levophed infusing.  O2 sat 89% on 14 PEEP, 1.0 FiO2. Day #7 on the ventilator. C-reactive is trending down. No ABG or chest x-ray to review today. No other aggravating or alleviating factors.     Meds:  albuterol sulfate HFA, 2 puff, Inhalation, Q4H - RT  artificial tears, , Both Eyes, Q4H  ascorbic acid, 500 mg, Oral, Daily  atorvastatin, 10 mg, Oral, Nightly  budesonide-formoterol, 2 puff, Inhalation, BID - RT  cholecalciferol, 1,000 Units, Oral, Daily  dexamethasone, 6 mg, Oral, BID   Or  dexamethasone, 6 mg, Intravenous, BID  enoxaparin, 1 mg/kg, Subcutaneous, Q12H  famotidine, 20 mg, Oral, Daily  fenofibrate, 48 mg, Oral, Daily  guaiFENesin, 200 mg, Oral, TID  insulin lispro, 0-9 Units, Subcutaneous, Q6H  melatonin, 3 mg, Oral, Nightly  multivitamin with minerals, 1 tablet, Oral, Daily  piperacillin-tazobactam, 4.5 g, Intravenous, Q6H  polyethylene glycol, 17 g, Oral, Daily  rocuronium, 50 mg, Intravenous, Once  thiamine, 100 mg, Oral, Daily  zinc sulfate, 220 mg, Oral, Daily      fentanyl 10 mcg/mL,  mcg/hr, Last Rate: 250 mcg/hr (12/01/21 0138)  norepinephrine, 0.02-0.3 mcg/kg/min, Last Rate: 0.14 mcg/kg/min (12/01/21 0520)  propofol, 5-75 mcg/kg/min, Last Rate: 75 mcg/kg/min (12/01/21 0520)  rocuronium (ZEMURON) infusion, 8-12 mcg/kg/min, Last Rate: Stopped (11/29/21 0205)  sodium chloride, 75 mL/hr, Last Rate: 75 mL/hr (12/01/21 0612)      Review of Systems:   Cannot obtain due to  mechanical ventilated state  Ventilator Settings:        Resp Rate (Set): 32     FiO2 (%): 100 %  PEEP/CPAP (cm H2O): 14 cm H20  Minute Ventilation (L/min) (Obs): 14.5 L/min  Resp Rate (Observed) Vent: 32  I:E Ratio (Set): 1:1.5  I:E Ratio (Obs): 1:1.50  PIP Observed (cm H2O): 42 cm H2O     Physical Exam:  Temp:  [97.2 °F (36.2 °C)-99 °F (37.2 °C)] 97.2 °F (36.2 °C)  Heart Rate:  [71-93] 82  Resp:  [32] 32  BP: ()/(54-71) 108/64  Arterial Line BP: ()/(42-66) 113/48  FiO2 (%):  [100 %] 100 %    Intake/Output Summary (Last 24 hours) at 12/1/2021 0715  Last data filed at 12/1/2021 0342  Gross per 24 hour   Intake 5967.44 ml   Output 2975 ml   Net 2992.44 ml     SpO2 Percentage    12/01/21 0615 12/01/21 0630 12/01/21 0645   SpO2: (!) 87% (!) 89% (!) 88%      GENERAL/CONSTITUTIONAL:  Pt is on vent. He appears very ill.  HEENT: atraumatic, normocephalic. Nutrition infusing to OG.  NOSE: normal  NECK: jugular veins nondistended  CHEST: no paradox, no retractions.  No respiratory distress.   Vent synchrony: yes  CARDIAC: normal rate  ABDOMEN: deferred  : deferred  EXTREMITIES: deferred  NEURO:  sedated, mechanically ventilated  SKIN: no jaundice.   Results from last 7 days   Lab Units 12/01/21 0349 11/30/21 0337 11/29/21  0554   WBC 10*3/mm3 13.77* 17.66* 17.44*   HEMOGLOBIN g/dL 10.0* 10.8* 11.3*   PLATELETS 10*3/mm3 168 207 183     Results from last 7 days   Lab Units 12/01/21 0349 11/30/21 2022 11/30/21  0337 11/29/21  0754 11/29/21  0554   SODIUM mmol/L 145 145 146*   < > 144   POTASSIUM mmol/L 5.5* 5.0 4.9   < > 5.2   BUN mg/dL 87* 87* 99*   < > 110*   CREATININE mg/dL 1.04 1.13 1.17   < > 1.75*   CRP mg/dL 1.47*  --  2.94*   < > 5.53*   PROCALCITONIN ng/mL 0.13  --   --   --  0.27*    < > = values in this interval not displayed.     Results from last 7 days   Lab Units 11/30/21  0805 11/28/21  1447 11/28/21  0322   PH, ARTERIAL pH units 7.377 7.326* 7.326*   PCO2, ARTERIAL mm Hg 60.4* 60.4* 56.9*    PO2 ART mm Hg 76.4* 48.9* 67.4*   FIO2 % 100 100 100     Respiratory Culture   Date Value Ref Range Status   11/25/2021 Light growth (2+) Pseudomonas species (A)  Preliminary   11/25/2021 Light growth (2+) Gram Negative Bacilli (A)  Preliminary   11/25/2021 No Normal Respiratory Michelle (A)  Preliminary     Recent films:  No radiology results for the last day  Films reviewed personally by me.  My interpretation: none to review today   Pulmonary Assessment:  1. Acute respiratory failure with ARDS, very poor lung compliance   2. Bilateral COVID-19 pneumonia, vaccinated patient. Hospitalized 11/15.  Status post Tocilizumab for high inflammatory markers and hypoxia  3. Pseudomonas growth in respiratory cultures  4. Steroid-induced hyperglycemia  5. Personal history of remote pulmonary embolism  6. Obesity, BMI 31  7. Acute kidney injury-due to acute tubular necrosis    Recommend:   · D#7 ETT.  Continue mechanical ventilation titrate oxygen for saturation 90%. Currently on PEEP 14, FiO2 1.0.  · Continue dvt prophylaxis-Lovenox  · Continue stress ulcer prophylaxis while on vent-Pepcid  · Proning candidate: No, he does not tolerate  · Dexamethasone day 8 of 10; continue at BID dosing  · ABG/chest x-ray as needed  · Continue Symbicort and albuterol HFA  · Continue Robitussin  · Continue adjuvant therapy zinc, vitamin D, vitamin C  · Continue antibiotic per ID -zosyn  · Prognosis guarded/poor    Electronically signed by KLAUS Jones on 12/1/2021 at 07:15 CST    Physician substantive portion:  Patient is without new complaints.  He unfortunately still remains mechanically ventilated.  He has high plateau pressures high PCO2 low tidal volumes high FiO2 and unable to make any progress on any of these.  He remains on dexamethasone.  On exam he is critically ill-appearing.  He does have good ventilator synchrony.  He is sedated he is not jaundiced.  Empiric antibiotics continue.  Prognosis seems extremely poor.   The one glimmer of hope is improved PO2 today, although the high PCO2 despite the amount of ventilation we are giving is worrisome.    I have seen and examined patient personally, performing a face-to-face diagnostic evaluation with plan of care reviewed and developed with APRN and nursing staff. I have addended and/or modified the above history of present illness, physical examination, and assessment and plan to reflect my findings and impressions. Essential elements of the care plan were discussed with APRN above.  Agree with findings and assessment/plan as documented above.    Electronically signed by Reji Bhagat MD, on 12/1/2021, 18:36 CST

## 2021-12-01 NOTE — PROGRESS NOTES
INFECTIOUS DISEASES PROGRESS NOTE    Patient:  Carlton Ritter  YOB: 1950  MRN: [id number]   Admit date: 11/15/2021   Admitting Physician: Julito Main MD  Primary Care Physician: Jose Luciano MD      Chief Complaint: Unobtainable from patient on ventilator      Interval History: Patient remains on maximal ventilator settings.  He just was cleaned up the bed and turned on his right side.  O2 sats are around 85%.  THERESA Hale is monitoring closely to see how long it takes him to recover.  He is on 0.14 mcg/kg/min of norepinephrine and she plans to try to wean that down.    She noted that he was not withdrawing to pain so she has cut back on his sedation a little bit    Hematuria has improved and Lovenox has been restarted      Allergies: No Known Allergies    Current Meds:     Current Facility-Administered Medications:   •  acetaminophen (TYLENOL) tablet 650 mg, 650 mg, Oral, Q4H PRN, 650 mg at 11/24/21 0806 **OR** acetaminophen (TYLENOL) suppository 650 mg, 650 mg, Rectal, Q4H PRN, Nikolas Gray MD  •  acetaminophen (TYLENOL) tablet 650 mg, 650 mg, Oral, Q4H PRN **OR** acetaminophen (TYLENOL) suppository 650 mg, 650 mg, Rectal, Q4H PRN, Nikolas Gray MD  •  albuterol sulfate HFA (PROVENTIL HFA;VENTOLIN HFA;PROAIR HFA) inhaler 2 puff, 2 puff, Inhalation, Q4H - RT, Nikolas Gray MD, 2 puff at 12/01/21 0723  •  artificial tears ophthalmic ointment, , Both Eyes, Q4H, Charles Hamilton MD, Given at 12/01/21 0610  •  ascorbic acid (VITAMIN C) tablet 500 mg, 500 mg, Oral, Daily, Nikolas Gray MD, 500 mg at 12/01/21 0806  •  atorvastatin (LIPITOR) tablet 10 mg, 10 mg, Oral, Nightly, Nikolas Gray MD, 10 mg at 11/30/21 2002  •  benzonatate (TESSALON) capsule 200 mg, 200 mg, Oral, Q4H PRN, Nikolas Gray MD, 200 mg at 11/24/21 0807  •  budesonide-formoterol (SYMBICORT) 160-4.5 MCG/ACT inhaler 2 puff, 2 puff, Inhalation, BID - RT, Aliya Fam APRN, 2 puff at  12/01/21 0721  •  cholecalciferol (VITAMIN D3) tablet 1,000 Units, 1,000 Units, Oral, Daily, Nikolas Gray MD, 1,000 Units at 12/01/21 0806  •  dexamethasone (DECADRON) tablet 6 mg, 6 mg, Oral, BID, 6 mg at 12/01/21 0806 **OR** dexamethasone (DECADRON) injection 6 mg, 6 mg, Intravenous, BID, Markus Hamilton MD, 6 mg at 11/30/21 2001  •  dextromethorphan polistirex ER (DELSYM) 30 MG/5ML oral suspension 60 mg, 60 mg, Oral, Q12H PRN, Nikolas Gray MD, 60 mg at 11/23/21 2011  •  dextrose (D50W) (25 g/50 mL) IV injection 25 g, 25 g, Intravenous, Q15 Min PRN, Nikolas Gray MD  •  dextrose (D50W) (25 g/50 mL) IV injection 50 mL, 50 mL, Intravenous, Q1H PRN, Aravind Landon MD, 50 mL at 11/26/21 1235  •  dextrose (GLUTOSE) oral gel 15 g, 15 g, Oral, Q15 Min PRN, Nikolas Gray MD  •  enoxaparin (LOVENOX) syringe 90 mg, 1 mg/kg, Subcutaneous, Q12H, Julito Main MD, 90 mg at 12/01/21 0839  •  famotidine (PEPCID) tablet 20 mg, 20 mg, Oral, Daily, Aravind Landon MD, 20 mg at 12/01/21 0806  •  fenofibrate (TRICOR) tablet 48 mg, 48 mg, Oral, Daily, Aravind Landon MD, 48 mg at 12/01/21 0806  •  fentaNYL 2500 mcg/250 mL NS infusion,  mcg/hr, Intravenous, Titrated, Markus Hamilton MD, Last Rate: 25 mL/hr at 12/01/21 0138, 250 mcg/hr at 12/01/21 0138  •  glucagon (human recombinant) (GLUCAGEN DIAGNOSTIC) injection 1 mg, 1 mg, Subcutaneous, Q15 Min PRN, Nikolas Gray MD  •  guaiFENesin (ROBITUSSIN) 100 MG/5ML oral solution 200 mg, 200 mg, Oral, TID, Aliya Fam APRN, 200 mg at 12/01/21 0806  •  insulin lispro (humaLOG) injection 0-9 Units, 0-9 Units, Subcutaneous, Q6H, Aravind Landon MD, 4 Units at 12/01/21 0610  •  melatonin tablet 3 mg, 3 mg, Oral, Nightly, Nikolas Gray MD, 3 mg at 11/30/21 2007  •  multivitamin with minerals 1 tablet, 1 tablet, Oral, Daily, Nikolas Gray MD, 1 tablet at 12/01/21 0806  •  norepinephrine (LEVOPHED) 8 mg in 250 mL NS infusion (premix), 0.02-0.3  "mcg/kg/min, Intravenous, Titrated, Aravind Landon MD, Last Rate: 23.5 mL/hr at 21 0520, 0.14 mcg/kg/min at 21 0520  •  ondansetron (ZOFRAN) tablet 4 mg, 4 mg, Oral, Q6H PRN **OR** ondansetron (ZOFRAN) injection 4 mg, 4 mg, Intravenous, Q6H PRN, Nikolas Gray MD  •  piperacillin-tazobactam (ZOSYN) 4.5 g in iso-osmotic dextrose 100 mL IVPB (premix), 4.5 g, Intravenous, Q6H, Kelsey Zuñiga MD, 4.5 g at 21 0610  •  polyethylene glycol (MIRALAX) packet 17 g, 17 g, Oral, Daily, Aravind Landon MD, 17 g at 21 0806  •  propofol (DIPRIVAN) infusion 10 mg/mL 100 mL, 5-75 mcg/kg/min, Intravenous, Titrated, Aravind Landon MD, Last Rate: 40.4 mL/hr at 21, 75 mcg/kg/min at 21 07  •  rocuronium (ZEMURON) 500 mg in sodium chloride 0.9 % 100 mL infusion, 8-12 mcg/kg/min, Intravenous, Titrated, Charles Hamilton MD, Held at 21 0205  •  rocuronium (ZEMURON) injection 22.4 mg, 250 mcg/kg, Intravenous, Once PRN, Charles Hamilton MD  •  rocuronium (ZEMURON) injection 50 mg, 50 mg, Intravenous, Once, Aravind Landon MD  •  sodium chloride 0.45 % infusion, 75 mL/hr, Intravenous, Continuous, Mac Ng MD, Last Rate: 75 mL/hr at 21, 75 mL/hr at 21  •  sodium chloride nasal spray 2 spray, 2 spray, Each Nare, PRN, Kelsey Zuñiga MD  •  thiamine (VITAMIN B-1) tablet 100 mg, 100 mg, Oral, Daily, Julito Main MD, 100 mg at 21 0807  •  zinc sulfate (ZINCATE) capsule 220 mg, 220 mg, Oral, Daily, iNkolas Gray MD, 220 mg at 21 0806      Review of Systems   Unable to perform ROS: Intubated       Objective     Vital Signs:  Temp (24hrs), Av °F (36.7 °C), Min:97.2 °F (36.2 °C), Max:98.6 °F (37 °C)      /60   Pulse 80   Temp 97.2 °F (36.2 °C) (Axillary)   Resp (!) 32   Ht 172.7 cm (68\")   Wt 91.9 kg (202 lb 8 oz)   SpO2 90%   BMI 30.79 kg/m²       Physical Exam  General: Patient was lying in bed turned somewhat to the " right.  Evaluated through the glass door and discussed with THERESA Hale  HEENT: ET tube in place  Respiratory: Appears synchronous with the vent rate in 30s  Neuro: Sedated on vent   Psych: Sedated on vent        Results Review:    I reviewed the patient's new clinical results.    Lab Results:  CBC:   Lab Results   Lab 11/25/21  0327 11/26/21  0417 11/27/21  0236 11/28/21  0616 11/29/21  0554 11/30/21 0337 12/01/21 0349   WBC 25.28* 28.00* 46.77* 21.30* 17.44* 17.66* 13.77*   HEMOGLOBIN 15.1 14.7 14.8 12.1* 11.3* 10.8* 10.0*   HEMATOCRIT 46.3 45.2 46.5 38.3 36.4* 35.6* 33.1*   PLATELETS 252 242 275 181 183 207 168   LYMPHOCYTE %  --   --  1.1*  --   --   --   --    MONOCYTES %  --   --  2.1*  --   --   --   --        CMP:   Lab Results   Lab 11/29/21  0554 11/29/21  0754 11/30/21  0337 11/30/21 2022 12/01/21 0349   SODIUM 144   < > 146* 145 145   POTASSIUM 5.2   < > 4.9 5.0 5.5*   CHLORIDE 105   < > 106 107 107   CO2 32.0*   < > 32.0* 33.0* 31.0*   *   < > 99* 87* 87*   CREATININE 1.75*   < > 1.17 1.13 1.04   CALCIUM 7.2*   < > 7.4* 7.5* 7.3*   BILIRUBIN 0.5  --  0.5  --  0.5   ALK PHOS 105  --  79  --  62   ALT (SGPT) 28  --  26  --  28   AST (SGOT) 31  --  37  --  35   GLUCOSE 176*   < > 220* 166* 218*    < > = values in this interval not displayed.       COVID LABS:  Results From Last 14 Days   Lab Units 12/01/21  0349 11/30/21  0337 11/29/21  0554 11/28/21  0616 11/27/21  0236 11/24/21  0319 11/23/21 0307 11/21/21 0327 11/21/21  0327 11/20/21  0301 11/20/21  0301 11/19/21  0637 11/19/21  0230 11/19/21  0230   CRP mg/dL 1.47* 2.94* 5.53*   < > 2.02*   < > 0.68*   < > 1.62*   < > 2.40*  --    < > 4.96*   D DIMER QUANT mg/L (FEU)  --   --   --   --   --   --   --   --   --   --   --  >20.00*  --   --    FERRITIN ng/mL  --   --   --   --   --   --   --   --  1,115.00*  --  1,032.00*  --   --  1,088.00*   LDH U/L  --   --   --   --   --   --   --   --   --   --   --   --   --  967*   PROCALCITONIN  ng/mL 0.13  --  0.27*  --  0.40*   < > 0.09   < > 0.11  --   --   --    < > 0.27*   PROTIME Seconds  --   --   --   --   --   --   --   --  14.8*  --   --  16.9*  --   --    INR   --   --   --   --   --   --   --   --  1.21*  --   --  1.43*  --   --    TRIGLYCERIDES mg/dL  --   --   --   --   --   --  324*  --   --   --   --   --   --   --     < > = values in this interval not displayed.         Estimated Creatinine Clearance: 73.8 mL/min (by C-G formula based on SCr of 1.04 mg/dL).    Culture Results:    Microbiology Results (last 10 days)     Procedure Component Value - Date/Time    Respiratory Culture - Sputum, ET Suction [[id number]]  (Abnormal)  (Susceptibility) Collected: 11/25/21 1045    Lab Status: Edited Result - FINAL Specimen: Sputum from ET Suction Updated: 11/28/21 0953     Respiratory Culture Light growth (2+) Pseudomonas aeruginosa      Light growth (2+) Klebsiella pneumoniae ssp pneumoniae      No Normal Respiratory Abdirahman     Gram Stain Greater than 25 WBCs per low power field      No Epithelial cells per low power field      Many (4+) Gram negative bacilli      Few (2+) Mixed gram positive abdirahman    Susceptibility      Pseudomonas aeruginosa     KG     Cefazolin Resistant (C)  [1]      Ceftazidime Susceptible     Ciprofloxacin Susceptible     Gentamicin Susceptible     Imipenem Susceptible (C)  [1]      Levofloxacin Susceptible     Meropenem Susceptible (C)  [1]      Piperacillin + Tazobactam Susceptible                 [1]  Appended report. These results have been appended to a previously final verified report.             Susceptibility      Klebsiella pneumoniae ssp pneumoniae     KG     Ampicillin Resistant     Ampicillin + Sulbactam Intermediate     Cefepime Susceptible     Ceftazidime Susceptible     Ceftriaxone Susceptible     Gentamicin Susceptible     Imipenem Susceptible (C)  [1]      Levofloxacin Intermediate     Piperacillin + Tazobactam Susceptible     Tetracycline Resistant      Trimethoprim + Sulfamethoxazole Susceptible                 [1]  Appended report. These results have been appended to a previously final verified report.             Susceptibility Comments     Pseudomonas aeruginosa    The previously reported component CEFEPIME is no longer being reported. Previous result was Susceptible (<=1 ug/ml) (Reference Range: [Reference Range]) on 11/28/2021 at 0827 CST.    Klebsiella pneumoniae ssp pneumoniae    Cefazolin sensitivity will not be reported for Enterobacteriaceae in non-urine isolates. If cefazolin is preferred, please call the microbiology lab to request an E-test.  With the exception of urinary-sourced infections, aminoglycosides should not be used as monotherapy.                        Radiology:               Imaging Results (Last 72 Hours)     Procedure Component Value Units Date/Time    XR Chest 1 View [[id number]] Collected: 11/28/21 2053     Updated: 11/28/21 2058    Narrative:      EXAMINATION:  XR CHEST 1 VW-  11/28/2021 8:48 PM CST     HISTORY: Covid 19 pneumonia. Desaturation. Hypoxemia.     COMPARISON: 11/27/2021.     FINDINGS:  There have been no tube or line changes. There are diffuse  bilateral infiltrates. The infiltrates may be improved slightly in the  mid and lower lung zones. Heart size is borderline. There is mild  blunting of the costophrenic angles.       Impression:      1. Diffuse bilateral infiltrates. The infiltrates appear slightly less  dense in the mid and lower lung zones.  2. Heart size is borderline. There is mild blunting of the costophrenic  angles.        This report was finalized on 11/28/2021 20:55 by Dr. Dino Loo MD.                    Assessment/Plan     Active Hospital Problems    Diagnosis    • **Pneumonia due to COVID-19 virus    • Acute respiratory failure with hypoxia (HCC)    • History of pulmonary embolism    • Hyperglycemia    • Essential hypertension    Acute respiratory failure secondary to COVID-19 infection patient had  been on max BiPAP setting for several days.  Intubated November 24.  On FiO2 of 100%.  Possible superimposed bacterial pneumonia with  Pseudomonas aeruginosa and Klebsiella.  Acute kidney injury-essentially resolved nephrology following  Markedly elevated CRP-trending back down   diabetes mellitus per hemoglobin A1c of 6.7  New bilateral DVT in patient with history of DVT and PE in 2016.  Leukocytosis -improving      RECOMMENDATION:   · Continue zosyn.  Increased dose to 4.5 g for Pseudomonas dosing given improved renal function.( Day #6 of abx - meropenem first)  · Continue dexamethasone-increased to twice daily November 24  · Wean norepinephrine as tolerated  ·  Lovenox restarted  · Adjuvant therapy per hospitalist    Discussed with THERESA Hale MD  12/01/21  08:56 CST

## 2021-12-01 NOTE — PROGRESS NOTES
Nephrology ([facility]) Progress Note      Patient:  Carlton Ritter  YOB: 1950  Date of Service: 12/1/2021  MRN: [id number]   Acct: [id number]   Primary Care Physician: Jose Luciano MD  Advance Directive:   Code Status and Medical Interventions:   Ordered at: 11/16/21 0054     Level Of Support Discussed With:    Patient     Code Status (Patient has no pulse and is not breathing):    CPR (Attempt to Resuscitate)     Medical Interventions (Patient has pulse or is breathing):    Full Support     Admit Date: 11/15/2021       Hospital Day: 16  Referring Provider: No Known Provider      Patient personally seen and examined.  Complete chart including Consults, Notes, Operative Reports, Labs, Cardiology, and Radiology studies reviewed as able.    Chief complaint: Abnormal labs.    Subjective:  Carlton Ritter is a 71 y.o. male for whom we were consulted for evaluation and treatment of acute kidney injury and hyperkalemia.  Admitted on 11/15 with COVID-19 pneumonia. Required intubation on 11/24 due to worsening hypoxia.  Levophed also started on 11/24. Starting on 11/24 creatinine and potassium levels started climbing. Patient started on TID Lokelma on 11/25.  Remained persistently hyperkalemic, but creatinine and potassium levels have been improving the last few days    Patient remains intubated and sedated.  He is still on very low-dose of Levophed/0.2 mcg.  He has responded to IV fluid and has significant improvement of renal function.  However he remains on 100% FiO2 with a PEEP of 14.      Allergies:  Patient has no known allergies.    Home Meds:  Medications Prior to Admission   Medication Sig Dispense Refill Last Dose   • apixaban (ELIQUIS) 5 MG tablet tablet Take 1 tablet by mouth Every 12 (Twelve) Hours. 60 tablet 2 Past Week at Unknown time   • ezetimibe (ZETIA) 10 MG tablet Take 10 mg by mouth Daily.   Past Week at Unknown time   • olmesartan (BENICAR) 20 MG tablet  Take 40 mg by mouth Daily.   Past Week at Unknown time       Medicines:  Current Facility-Administered Medications   Medication Dose Route Frequency Provider Last Rate Last Admin   • acetaminophen (TYLENOL) tablet 650 mg  650 mg Oral Q4H PRN Nikolas Gray MD   650 mg at 11/24/21 0806    Or   • acetaminophen (TYLENOL) suppository 650 mg  650 mg Rectal Q4H PRN Nikolas Gray MD       • acetaminophen (TYLENOL) tablet 650 mg  650 mg Oral Q4H PRN Nikolas Gray MD        Or   • acetaminophen (TYLENOL) suppository 650 mg  650 mg Rectal Q4H PRN Nikolas Gray MD       • albuterol sulfate HFA (PROVENTIL HFA;VENTOLIN HFA;PROAIR HFA) inhaler 2 puff  2 puff Inhalation Q4H - RT Nikolas Gray MD   2 puff at 12/01/21 1032   • artificial tears ophthalmic ointment   Both Eyes Q4H Charles Hamilton MD   Given at 12/01/21 1019   • ascorbic acid (VITAMIN C) tablet 500 mg  500 mg Oral Daily Nikolas Gray MD   500 mg at 12/01/21 0806   • atorvastatin (LIPITOR) tablet 10 mg  10 mg Oral Nightly Nikolas Gray MD   10 mg at 11/30/21 2002   • benzonatate (TESSALON) capsule 200 mg  200 mg Oral Q4H PRN Nikolas Gray MD   200 mg at 11/24/21 0807   • budesonide-formoterol (SYMBICORT) 160-4.5 MCG/ACT inhaler 2 puff  2 puff Inhalation BID - RT Aliya Fam APRN   2 puff at 12/01/21 0721   • cholecalciferol (VITAMIN D3) tablet 1,000 Units  1,000 Units Oral Daily Nikolas Gray MD   1,000 Units at 12/01/21 0806   • dexamethasone (DECADRON) tablet 6 mg  6 mg Oral BID Markus Hamilton MD   6 mg at 12/01/21 0806    Or   • dexamethasone (DECADRON) injection 6 mg  6 mg Intravenous BID Markus Hamilton MD   6 mg at 11/30/21 2001   • dextromethorphan polistirex ER (DELSYM) 30 MG/5ML oral suspension 60 mg  60 mg Oral Q12H PRN Nikolas Gray MD   60 mg at 11/23/21 2011   • dextrose (D50W) (25 g/50 mL) IV injection 25 g  25 g Intravenous Q15 Min PRN Nikolas Gray MD       • dextrose (D50W) (25 g/50 mL) IV injection  50 mL  50 mL Intravenous Q1H PRN Aravind Landon MD   50 mL at 11/26/21 1235   • dextrose (GLUTOSE) oral gel 15 g  15 g Oral Q15 Min PRN Nikolas Gary MD       • enoxaparin (LOVENOX) syringe 90 mg  1 mg/kg Subcutaneous Q12H Julito Main MD   90 mg at 12/01/21 0839   • famotidine (PEPCID) tablet 20 mg  20 mg Oral Daily Aravind Landon MD   20 mg at 12/01/21 0806   • fenofibrate (TRICOR) tablet 48 mg  48 mg Oral Daily Aravind Landon MD   48 mg at 12/01/21 0806   • fentaNYL 2500 mcg/250 mL NS infusion   mcg/hr Intravenous Titrated Markus Hamilton MD 10 mL/hr at 12/01/21 1100 100 mcg/hr at 12/01/21 1100   • fentaNYL 2500 mcg/250 mL NS infusion   mcg/hr Intravenous Titrated Julito Main MD       • glucagon (human recombinant) (GLUCAGEN DIAGNOSTIC) injection 1 mg  1 mg Subcutaneous Q15 Min PRN Nikolas Gray MD       • guaiFENesin (ROBITUSSIN) 100 MG/5ML oral solution 200 mg  200 mg Oral TID Aliya Fam APRN   200 mg at 12/01/21 0806   • insulin lispro (humaLOG) injection 0-9 Units  0-9 Units Subcutaneous Q6H Aravind Landon MD   4 Units at 12/01/21 0610   • melatonin tablet 3 mg  3 mg Oral Nightly Nikolas Gray MD   3 mg at 11/30/21 2007   • multivitamin with minerals 1 tablet  1 tablet Oral Daily Nikolas Gray MD   1 tablet at 12/01/21 0806   • norepinephrine (LEVOPHED) 8 mg in 250 mL NS infusion (premix)  0.02-0.3 mcg/kg/min Intravenous Titrated Aravind Landon MD 13.46 mL/hr at 12/01/21 1040 0.08 mcg/kg/min at 12/01/21 1040   • ondansetron (ZOFRAN) tablet 4 mg  4 mg Oral Q6H PRN Nikolas Gray MD        Or   • ondansetron (ZOFRAN) injection 4 mg  4 mg Intravenous Q6H PRN Nikolas Gray MD       • piperacillin-tazobactam (ZOSYN) 4.5 g in iso-osmotic dextrose 100 mL IVPB (premix)  4.5 g Intravenous Q6H Kelsey Zuñiga MD   4.5 g at 12/01/21 0610   • polyethylene glycol (MIRALAX) packet 17 g  17 g Oral Daily Aravind Landon MD   17 g at 12/01/21 0806    • propofol (DIPRIVAN) infusion 10 mg/mL 100 mL  5-75 mcg/kg/min Intravenous Titrated Aravind Landon MD 21.5 mL/hr at 12/01/21 1049 40 mcg/kg/min at 12/01/21 1049   • rocuronium (ZEMURON) 500 mg in sodium chloride 0.9 % 100 mL infusion  8-12 mcg/kg/min Intravenous Titrated Charles Hamilton MD   Held at 11/29/21 0205   • rocuronium (ZEMURON) injection 22.4 mg  250 mcg/kg Intravenous Once PRN Charles Hamilton MD       • rocuronium (ZEMURON) injection 50 mg  50 mg Intravenous Once Aravind Landon MD       • sodium chloride 0.45 % infusion  75 mL/hr Intravenous Continuous Mac Ng MD 75 mL/hr at 12/01/21 0612 75 mL/hr at 12/01/21 0612   • sodium chloride nasal spray 2 spray  2 spray Each Nare PRN Kelsey Zuñiga MD       • thiamine (VITAMIN B-1) tablet 100 mg  100 mg Oral Daily Julito Main MD   100 mg at 12/01/21 0807   • zinc sulfate (ZINCATE) capsule 220 mg  220 mg Oral Daily Nikolas Gray MD   220 mg at 12/01/21 0806       Past Medical History:  Past Medical History:   Diagnosis Date   • Acute saddle pulmonary embolism with acute cor pulmonale (HCC)    • Hyperlipidemia    • Hypertension        Past Surgical History:  Past Surgical History:   Procedure Laterality Date   • CARDIAC CATHETERIZATION N/A 10/21/2016    Procedure: Thrombolytic Therapy;  Surgeon: Andrew Akins MD;  Location:  [facility];  Service:    • EKOS CATHETER REMOVAL Right 10/22/2016    Procedure: Ekos catheter removal with stent placement;  Surgeon: Percy Chavarria MD;  Location:  [facility];  Service:        Family History  Family History   Problem Relation Age of Onset   • Heart disease Mother    • Hypertension Mother    • Heart disease Father    • Hypertension Brother        Social History  Social History     Socioeconomic History   • Marital status:    Tobacco Use   • Smoking status: Never Smoker   • Tobacco comment: Wife is surrogate decision-maker   Substance and Sexual Activity    • Alcohol use: No   • Drug use: No   • Sexual activity: Defer         Review of Systems:  Unable to obtain due to intubated and sedated    Objective:  Patient Vitals for the past 24 hrs:   BP Temp Temp src Pulse Resp SpO2 Weight   12/01/21 1130 -- -- -- 92 -- (!) 88 % --   12/01/21 1100 109/59 -- -- 88 -- 90 % --   12/01/21 1035 -- -- -- 88 (!) 32 (!) 86 % --   12/01/21 1032 -- -- -- 88 (!) 32 (!) 84 % --   12/01/21 1030 -- -- -- 87 -- (!) 85 % --   12/01/21 1000 113/68 -- -- 85 -- (!) 86 % --   12/01/21 0930 -- -- -- 78 -- (!) 87 % --   12/01/21 0900 108/68 -- -- 77 -- (!) 86 % --   12/01/21 0830 -- -- -- 77 -- (!) 87 % --   12/01/21 0800 104/60 97.5 °F (36.4 °C) Axillary 80 -- 90 % --   12/01/21 0730 -- -- -- 81 -- (!) 89 % --   12/01/21 0727 -- -- -- 80 (!) 32 (!) 88 % --   12/01/21 0723 -- -- -- 81 -- (!) 88 % --   12/01/21 0721 -- -- -- 82 (!) 32 (!) 88 % --   12/01/21 0645 -- -- -- 82 -- (!) 88 % --   12/01/21 0630 -- -- -- 82 -- (!) 89 % --   12/01/21 0615 -- -- -- 89 -- (!) 87 % --   12/01/21 0600 108/64 -- -- 87 -- (!) 83 % --   12/01/21 0545 -- -- -- 88 -- (!) 86 % --   12/01/21 0530 -- -- -- 88 -- (!) 85 % --   12/01/21 0515 -- -- -- 88 -- (!) 85 % --   12/01/21 0500 115/64 -- -- 81 -- (!) 87 % --   12/01/21 0445 -- -- -- 83 -- (!) 85 % --   12/01/21 0430 -- -- -- 81 -- (!) 86 % --   12/01/21 0415 -- -- -- 78 -- (!) 87 % --   12/01/21 0400 114/63 -- -- 80 -- (!) 88 % 91.9 kg (202 lb 8 oz)   12/01/21 0345 -- -- -- 81 -- (!) 86 % --   12/01/21 0342 -- 97.2 °F (36.2 °C) Axillary -- -- -- 81.6 kg (179 lb 14.4 oz)   12/01/21 0330 -- -- -- 77 -- 90 % --   12/01/21 0329 -- -- -- 78 -- 90 % --   12/01/21 0315 -- -- -- 77 -- 92 % --   12/01/21 0300 110/61 -- -- 77 -- 92 % --   12/01/21 0245 -- -- -- 80 -- 91 % --   12/01/21 0230 -- -- -- 80 -- 90 % --   12/01/21 0215 -- -- -- 76 -- 90 % --   12/01/21 0200 106/60 -- -- 76 -- 90 % --   12/01/21 0145 -- -- -- 76 -- 91 % --   12/01/21 0130 -- -- -- 77 -- 90 % --    12/01/21 0115 -- -- -- 78 -- (!) 89 % --   12/01/21 0100 109/61 -- -- 76 -- 90 % --   12/01/21 0045 -- -- -- 76 -- 90 % --   12/01/21 0030 -- -- -- 80 -- 90 % --   12/01/21 0015 -- -- -- 78 -- 92 % --   12/01/21 0000 105/58 98.6 °F (37 °C) Axillary 80 -- 91 % --   11/30/21 2315 -- -- -- 82 -- (!) 88 % --   11/30/21 2300 104/57 -- -- 80 -- (!) 87 % --   11/30/21 2245 -- -- -- 79 -- (!) 86 % --   11/30/21 2230 -- -- -- 82 -- (!) 83 % --   11/30/21 2215 -- -- -- 80 -- (!) 86 % --   11/30/21 2200 110/64 -- -- 81 -- (!) 85 % --   11/30/21 2145 -- -- -- 83 -- (!) 86 % --   11/30/21 2130 -- -- -- 85 -- (!) 85 % --   11/30/21 2115 -- -- -- 89 -- (!) 86 % --   11/30/21 2100 90/55 -- -- 80 -- (!) 89 % --   11/30/21 2045 -- -- -- 81 -- 90 % --   11/30/21 2030 -- -- -- 75 -- 90 % --   11/30/21 2015 -- -- -- 78 (!) 32 90 % --   11/30/21 2000 99/55 -- -- 82 -- 91 % --   11/30/21 1945 -- 97.5 °F (36.4 °C) Axillary 85 -- 90 % --   11/30/21 1930 -- -- -- 82 -- 92 % --   11/30/21 1915 -- -- -- 80 -- 93 % --   11/30/21 1900 98/54 -- -- 80 -- 92 % --   11/30/21 1830 -- -- -- 79 -- 93 % --   11/30/21 1815 -- -- -- 83 -- (!) 89 % --   11/30/21 1800 99/62 -- -- 80 -- 93 % --   11/30/21 1745 -- -- -- 77 -- 94 % --   11/30/21 1730 -- -- -- 81 -- 93 % --   11/30/21 1715 -- -- -- 81 -- 92 % --   11/30/21 1700 103/61 -- -- 79 -- 94 % --   11/30/21 1645 -- -- -- 75 -- 94 % --   11/30/21 1630 -- -- -- 78 -- 93 % --   11/30/21 1615 -- -- -- 76 -- 91 % --   11/30/21 1600 96/59 98.3 °F (36.8 °C) Axillary 79 -- 92 % --   11/30/21 1545 -- -- -- 76 -- 93 % --   11/30/21 1530 -- -- -- 75 -- 93 % --   11/30/21 1515 -- -- -- 78 -- 93 % --   11/30/21 1500 97/60 -- -- 78 -- 92 % --   11/30/21 1445 -- -- -- 78 -- 94 % --   11/30/21 1430 -- -- -- 79 -- 90 % --   11/30/21 1415 -- -- -- 93 -- (!) 87 % --   11/30/21 1400 112/59 -- -- 83 -- 92 % --   11/30/21 1351 -- -- -- 84 (!) 32 92 % --   11/30/21 1348 -- -- -- 84 (!) 32 92 % --   11/30/21 1345 -- -- -- 84  -- 92 % --   11/30/21 1330 -- -- -- 85 -- 91 % --   11/30/21 1315 -- -- -- 85 -- 91 % --   11/30/21 1300 119/58 -- -- 88 -- 91 % --   11/30/21 1245 -- -- -- 82 -- 92 % --   11/30/21 1230 -- -- -- 82 -- 92 % --   11/30/21 1215 -- -- -- 83 -- 92 % --       Intake/Output Summary (Last 24 hours) at 12/1/2021 1204  Last data filed at 12/1/2021 0800  Gross per 24 hour   Intake 5652.94 ml   Output 2250 ml   Net 3402.94 ml   In person physical examination was not done as he  has COVID-19 pneumonia.  This was to prevent unnecessary exposure of COVID-19 as well as unnecessary use of PPE.  However patient was seen through the glass door.  Finding of physical exam was discussed with registered nurse.  General: intubated, sedated  HEENT: Normocephalic atraumatic head/orotracheal intubation.  Chest:  equal chest rise  CVS: regular rate and rhythm  Abdominal: soft, nontender, positive bowel sounds  Extremities: no cyanosis or edema  Skin: warm and dry without rash      Labs:  Results from last 7 days   Lab Units 12/01/21 0349 11/30/21 0337 11/29/21  0554   WBC 10*3/mm3 13.77* 17.66* 17.44*   HEMOGLOBIN g/dL 10.0* 10.8* 11.3*   HEMATOCRIT % 33.1* 35.6* 36.4*   PLATELETS 10*3/mm3 168 207 183         Results from last 7 days   Lab Units 12/01/21  0349 11/30/21 2022 11/30/21  0337 11/29/21  0754 11/29/21  0554   SODIUM mmol/L 145 145 146*   < > 144   POTASSIUM mmol/L 5.5* 5.0 4.9   < > 5.2   CHLORIDE mmol/L 107 107 106   < > 105   CO2 mmol/L 31.0* 33.0* 32.0*   < > 32.0*   BUN mg/dL 87* 87* 99*   < > 110*   CREATININE mg/dL 1.04 1.13 1.17   < > 1.75*   CALCIUM mg/dL 7.3* 7.5* 7.4*   < > 7.2*   BILIRUBIN mg/dL 0.5  --  0.5  --  0.5   ALK PHOS U/L 62  --  79  --  105   ALT (SGPT) U/L 28  --  26  --  28   AST (SGOT) U/L 35  --  37  --  31   GLUCOSE mg/dL 218* 166* 220*   < > 176*    < > = values in this interval not displayed.       Radiology:   Imaging Results (Last 72 Hours)     Procedure Component Value Units Date/Time    XR Chest  1 View [[id number]] Collected: 11/28/21 2053     Updated: 11/28/21 2058    Narrative:      EXAMINATION:  XR CHEST 1 VW-  11/28/2021 8:48 PM CST     HISTORY: Covid 19 pneumonia. Desaturation. Hypoxemia.     COMPARISON: 11/27/2021.     FINDINGS:  There have been no tube or line changes. There are diffuse  bilateral infiltrates. The infiltrates may be improved slightly in the  mid and lower lung zones. Heart size is borderline. There is mild  blunting of the costophrenic angles.       Impression:      1. Diffuse bilateral infiltrates. The infiltrates appear slightly less  dense in the mid and lower lung zones.  2. Heart size is borderline. There is mild blunting of the costophrenic  angles.        This report was finalized on 11/28/2021 20:55 by Dr. Dino Loo MD.          Culture:  Respiratory Culture   Date Value Ref Range Status   11/25/2021 Light growth (2+) Pseudomonas aeruginosa (A)  Final   11/25/2021 (A)  Final    Light growth (2+) Klebsiella pneumoniae ssp pneumoniae   11/25/2021 No Normal Respiratory Michelle (A)  Final         Assessment   1.  Acute kidney injury/improving.  2.  Acute tubular necrosis.  3.  Recurrent hyperkalemia   4.  Bilateral COVID-19 pneumonia.  5.  Acute respiratory failure/intubated.  6.  Metabolic alkalosis.  7.  Hypernatremia now resolved.    Plan:  1.  Continue hypotonic IV fluids  2.  Resume Lokelma.  4.  Monitor electrolyte closely.      Mac Ng MD  12/1/2021  12:04 CST

## 2021-12-01 NOTE — PLAN OF CARE
Goal Outcome Evaluation:              Outcome Summary: Sats remained higher than 90 all day, did drop sat at times but recovered quickly, held lovenox d/t blood in urine, tolerating tube feeding, small bowel movement, increased sedation still withdraws to pain, placed on dolphin mattress to attempt to prevent any further breakdown on coccyx, continue to monitor

## 2021-12-01 NOTE — SIGNIFICANT NOTE
12/01/21 1037   Readings   PEEP Intrinsic (cm H2O) 15 cm H2O   Plateau Pressure (cm H2O) 38 cm H2O   Static Compliance (L/cm H2O) 18   Dynamic Compliance (L/cm H2O) 18 L/cm H2O

## 2021-12-01 NOTE — PROGRESS NOTES
Palliative Medicine Note    CC: intubated and sedated    History:  Carlton Ritter is a 71 y.o. male   He remains on a PEEP of 14 and FiO2 of 100%.  Saturations are in the low 90s.  Blood pressure has been softer today and Levophed has increased.  He has developed gross hematuria.  Lovenox is being held.    ROS:  Review of Systems   Unable to perform ROS: Intubated         Current Facility-Administered Medications:   •  acetaminophen (TYLENOL) tablet 650 mg, 650 mg, Oral, Q4H PRN, 650 mg at 11/24/21 0806 **OR** acetaminophen (TYLENOL) suppository 650 mg, 650 mg, Rectal, Q4H PRN, Nikolas Gray MD  •  acetaminophen (TYLENOL) tablet 650 mg, 650 mg, Oral, Q4H PRN **OR** acetaminophen (TYLENOL) suppository 650 mg, 650 mg, Rectal, Q4H PRN, Nikolas Gray MD  •  albuterol sulfate HFA (PROVENTIL HFA;VENTOLIN HFA;PROAIR HFA) inhaler 2 puff, 2 puff, Inhalation, Q4H - RT, Nikolas Gray MD, 2 puff at 11/30/21 1348  •  artificial tears ophthalmic ointment, , Both Eyes, Q4H, Charles Hamilton MD, Given at 11/30/21 0244  •  ascorbic acid (VITAMIN C) tablet 500 mg, 500 mg, Oral, Daily, Nikolas Gray MD, 500 mg at 11/30/21 0801  •  atorvastatin (LIPITOR) tablet 10 mg, 10 mg, Oral, Nightly, Nikolas Gray MD, 10 mg at 11/29/21 2001  •  benzonatate (TESSALON) capsule 200 mg, 200 mg, Oral, Q4H PRN, Nikolas Gray MD, 200 mg at 11/24/21 0807  •  budesonide-formoterol (SYMBICORT) 160-4.5 MCG/ACT inhaler 2 puff, 2 puff, Inhalation, BID - RT, Aliya Fam APRN, 2 puff at 11/30/21 0652  •  cholecalciferol (VITAMIN D3) tablet 1,000 Units, 1,000 Units, Oral, Daily, Nikolas Gray MD, 1,000 Units at 11/30/21 0801  •  dexamethasone (DECADRON) tablet 6 mg, 6 mg, Oral, BID, 6 mg at 11/28/21 2010 **OR** dexamethasone (DECADRON) injection 6 mg, 6 mg, Intravenous, BID, Markus Hamilton MD, 6 mg at 11/30/21 0801  •  dextromethorphan polistirex ER (DELSYM) 30 MG/5ML oral suspension 60 mg, 60 mg, Oral, Q12H PRN, Marina,  Nikolas MARKHAM MD, 60 mg at 11/23/21 2011  •  dextrose (D50W) (25 g/50 mL) IV injection 25 g, 25 g, Intravenous, Q15 Min PRN, Nikolas Gray MD  •  dextrose (D50W) (25 g/50 mL) IV injection 50 mL, 50 mL, Intravenous, Q1H PRN, Aravind Landon MD, 50 mL at 11/26/21 1235  •  dextrose (GLUTOSE) oral gel 15 g, 15 g, Oral, Q15 Min PRN, Nikolas Gray MD  •  enoxaparin (LOVENOX) syringe 90 mg, 1 mg/kg, Subcutaneous, Q12H, Julito Main MD, 90 mg at 11/29/21 2000  •  famotidine (PEPCID) tablet 20 mg, 20 mg, Oral, Daily, Aravind Landon MD, 20 mg at 11/30/21 0801  •  fenofibrate (TRICOR) tablet 48 mg, 48 mg, Oral, Daily, Aravind Landon MD, 48 mg at 11/30/21 0801  •  fentaNYL 2500 mcg/250 mL NS infusion,  mcg/hr, Intravenous, Titrated, Markus Hamilton MD, Last Rate: 25 mL/hr at 11/30/21 1432, 250 mcg/hr at 11/30/21 1432  •  glucagon (human recombinant) (GLUCAGEN DIAGNOSTIC) injection 1 mg, 1 mg, Subcutaneous, Q15 Min PRN, Nikolas Gray MD  •  guaiFENesin (ROBITUSSIN) 100 MG/5ML oral solution 200 mg, 200 mg, Oral, TID, Aliya Fam, APRN, 200 mg at 11/30/21 1603  •  insulin lispro (humaLOG) injection 0-9 Units, 0-9 Units, Subcutaneous, Q6H, Aravind Landon MD, 4 Units at 11/30/21 1802  •  melatonin tablet 3 mg, 3 mg, Oral, Nightly, Nikolas Gray MD, 3 mg at 11/29/21 2000  •  multivitamin with minerals 1 tablet, 1 tablet, Oral, Daily, Nikolas Gray MD, 1 tablet at 11/30/21 0803  •  norepinephrine (LEVOPHED) 8 mg in 250 mL NS infusion (premix), 0.02-0.3 mcg/kg/min, Intravenous, Titrated, Aravind Landon MD, Last Rate: 16.82 mL/hr at 11/30/21 1803, 0.1 mcg/kg/min at 11/30/21 1803  •  ondansetron (ZOFRAN) tablet 4 mg, 4 mg, Oral, Q6H PRN **OR** ondansetron (ZOFRAN) injection 4 mg, 4 mg, Intravenous, Q6H PRN, Nikolas Gray MD  •  piperacillin-tazobactam (ZOSYN) 4.5 g in iso-osmotic dextrose 100 mL IVPB (premix), 4.5 g, Intravenous, Q6H, Kelsey Zuñiga MD, 4.5 g at 11/30/21 1800  •   "polyethylene glycol (MIRALAX) packet 17 g, 17 g, Oral, Daily, Aravind Landon MD, 17 g at 11/30/21 0801  •  propofol (DIPRIVAN) infusion 10 mg/mL 100 mL, 5-75 mcg/kg/min, Intravenous, Titrated, Aravind Landon MD, Last Rate: 40.4 mL/hr at 11/30/21 1800, 75 mcg/kg/min at 11/30/21 1800  •  rocuronium (ZEMURON) 500 mg in sodium chloride 0.9 % 100 mL infusion, 8-12 mcg/kg/min, Intravenous, Titrated, Charles Hamilton MD, Held at 11/29/21 0205  •  rocuronium (ZEMURON) injection 22.4 mg, 250 mcg/kg, Intravenous, Once PRN, Charles Hamilton MD  •  rocuronium (ZEMURON) injection 50 mg, 50 mg, Intravenous, Once, Aravind Landon MD  •  sodium chloride 0.45 % infusion, 75 mL/hr, Intravenous, Continuous, Mac Ng MD, Last Rate: 75 mL/hr at 11/30/21 1603, 75 mL/hr at 11/30/21 1603  •  sodium chloride nasal spray 2 spray, 2 spray, Each Nare, PRN, Kelsey Zuñiga MD  •  thiamine (VITAMIN B-1) tablet 100 mg, 100 mg, Oral, Daily, Julito Main MD, 100 mg at 11/30/21 0801  •  zinc sulfate (ZINCATE) capsule 220 mg, 220 mg, Oral, Daily, Nikolas Gray MD, 220 mg at 11/30/21 0801      OBJECTIVE:  BP 99/62   Pulse 83   Temp 98.3 °F (36.8 °C) (Axillary)   Resp (!) 32   Ht 172.7 cm (68\")   Wt 93.1 kg (205 lb 4.8 oz)   SpO2 (!) 89%   BMI 31.22 kg/m²    Physical Exam  Constitutional:       Appearance: He is ill-appearing.   HENT:      Head: Normocephalic and atraumatic.   Pulmonary:      Effort: No respiratory distress.   Abdominal:      General: There is no distension.     Exam performed from outside the glass-doored room due to COVID positive to prevent cross-contamination, unnecessary exposure and/or unneeded PPE use.     US Venous Doppler Lower Extremity Bilateral (duplex) (11/19/2021 16:19)   Comprehensive Metabolic Panel (11/30/2021 03:37)   CBC & Differential (11/30/2021 03:37)       Assessment:    - Pneumonia due to COVID 19 virus  - Acute respiratory failure with hypoxia- Day 5 MV  - History of PE-on " anticoagulation  - DVT on Lovenox  - Hyperglycemia  - NGUYỄN with ATN  - Hyperkalemia  - Gross hematuria     PPS  Unable to assess due to intubation     Symptoms     1. Weakness  2. Debility  3. Hematuria     Plan     1.    Saturations remained steady today, though he has had an increase in his pressors.  I reached out to his wife and spoke with her to further delineate the issues he is facing and answer any questions.  She understands that his lungs and his kidneys are of the highest concern.  Kidney function improved today, but he has developed gross hematuria, for which Lovenox is being held, though he does have active DVT.    2.  We discussed that while his kidneys may be improving, his respiratory status has not improved over the last couple of days.  However, she is encouraged that his saturations are improved and would like to continue his current level of support.  We discussed that if we have reached a point where she felt he would not want to continue with the current level of support that we could pursue options toward maintaining his comfort as our primary goal.  She expressed understanding, but wished to continue to monitor with the current level of interventions at this time.    Discussed with Dr. Main and with THERESA Quach.  Patient was discussed in Palliative Interdisciplinary Team Meeting.    Hernesto Moore D.O. 11/30/2021   Palliative Medicine

## 2021-12-01 NOTE — PROGRESS NOTES
[facility] Medicine Services  INPATIENT PROGRESS NOTE    Patient Name: Carlton Ritter  Date of Admission: 11/15/2021  Today's Date: 12/01/21  Length of Stay: 16  Primary Care Physician: Jose Luciano MD    Subjective   Chief Complaint: SOA    Intubated/Sedated  Afebrile  O2 Sats 86% on 100% FIO2      Review of Systems   Unable to perform ROS: Intubated          All pertinent negatives and positives are as above. All other systems have been reviewed and are negative unless otherwise stated.     Objective    Temp:  [97.2 °F (36.2 °C)-98.6 °F (37 °C)] 97.2 °F (36.2 °C)  Heart Rate:  [71-93] 85  Resp:  [32] 32  BP: ()/(54-68) 113/68  Arterial Line BP: ()/(42-66) 124/62  FiO2 (%):  [100 %] 100 %  Physical Exam  Vitals reviewed.   Constitutional:       Appearance: He is well-developed.      Interventions: He is sedated and intubated.   HENT:      Head: Normocephalic and atraumatic.      Right Ear: External ear normal.      Left Ear: External ear normal.      Nose: Nose normal.   Eyes:      General: No scleral icterus.        Right eye: No discharge.         Left eye: No discharge.      Conjunctiva/sclera: Conjunctivae normal.      Pupils: Pupils are equal, round, and reactive to light.   Neck:      Thyroid: No thyromegaly.      Trachea: No tracheal deviation.   Cardiovascular:      Rate and Rhythm: Normal rate and regular rhythm.      Heart sounds: Normal heart sounds. No murmur heard.  No friction rub. No gallop.    Pulmonary:      Effort: Pulmonary effort is normal. No respiratory distress. He is intubated.      Breath sounds: No stridor. Decreased breath sounds and rhonchi present. No wheezing or rales.   Chest:      Chest wall: No tenderness.   Abdominal:      General: Bowel sounds are normal. There is no distension.      Palpations: Abdomen is soft. There is no mass.      Tenderness: There is no abdominal tenderness. There is no guarding or rebound.      Hernia: No  hernia is present.   Musculoskeletal:         General: No deformity. Normal range of motion.      Cervical back: Normal range of motion and neck supple.   Lymphadenopathy:      Cervical: No cervical adenopathy.   Skin:     General: Skin is warm and dry.      Coloration: Skin is not pale.      Findings: No erythema or rash.   Neurological:      Mental Status: He is unresponsive.      Cranial Nerves: No cranial nerve deficit.      Motor: No abnormal muscle tone.      Coordination: Coordination normal.      Deep Tendon Reflexes: Reflexes are normal and symmetric. Reflexes normal.           Results Review:  I have reviewed the labs, radiology results, and diagnostic studies.    Laboratory Data:   Results from last 7 days   Lab Units 12/01/21 0349 11/30/21 0337 11/29/21  0554   WBC 10*3/mm3 13.77* 17.66* 17.44*   HEMOGLOBIN g/dL 10.0* 10.8* 11.3*   HEMATOCRIT % 33.1* 35.6* 36.4*   PLATELETS 10*3/mm3 168 207 183        Results from last 7 days   Lab Units 12/01/21 0349 11/30/21 2022 11/30/21 0337 11/29/21  0754 11/29/21  0554   SODIUM mmol/L 145 145 146*   < > 144   POTASSIUM mmol/L 5.5* 5.0 4.9   < > 5.2   CHLORIDE mmol/L 107 107 106   < > 105   CO2 mmol/L 31.0* 33.0* 32.0*   < > 32.0*   BUN mg/dL 87* 87* 99*   < > 110*   CREATININE mg/dL 1.04 1.13 1.17   < > 1.75*   CALCIUM mg/dL 7.3* 7.5* 7.4*   < > 7.2*   BILIRUBIN mg/dL 0.5  --  0.5  --  0.5   ALK PHOS U/L 62  --  79  --  105   ALT (SGPT) U/L 28  --  26  --  28   AST (SGOT) U/L 35  --  37  --  31   GLUCOSE mg/dL 218* 166* 220*   < > 176*    < > = values in this interval not displayed.       Culture Data:   No results found for: BLOODCX, URINECX, WOUNDCX, MRSACX, RESPCX, STOOLCX    Radiology Data:   Imaging Results (Last 24 Hours)     ** No results found for the last 24 hours. **          I have reviewed the patient's current medications.     Labs reviewed: leukocytosis     Telemetry reviewed    Telemetry independently interpreted by me:  NSR      Assessment/Plan      Active Hospital Problems    Diagnosis    • **Pneumonia due to COVID-19 virus    • Acute respiratory failure with hypoxia (HCC)    • History of pulmonary embolism    • Hyperglycemia    • Essential hypertension      1.  Acute Hypoxic respiratory failure  -Decadron  -Pulm consulted and following  -Tocilzumab given    2.  COVID-19 pneumonia  -Decadron  -Pulm consulted and following  -Tocilzumab given  -ID consulted and following    3.  HTN  -monitor    4.  History of PE  -Lovenox    5.  HLD  -Statin    6.  DVT  -Lovenox    7.  Superimposed bacterial pneumonia due to Pseudomonas Aeruginosa and Klebsiella  -IV Abx  -ID following          Discharge Planning: continue in CCU, poor overall prognosis    Electronically signed by Julito Main MD, 12/01/21, 10:22 CST.

## 2021-12-01 NOTE — PLAN OF CARE
Attempted to contact patient's spouse for follow-up. Left voicemail. Patient remains intubated and sedated. Hematuria has improved and anti-coagulation has been resumed. Will plan to arrange family conference after the weekend and if spouse is agreeable.     Will continue to follow until discharge.    MIGUEL ANGEL Cano  12/1/2021

## 2021-12-02 NOTE — CASE MANAGEMENT/SOCIAL WORK
Continued Stay Note  [facility]     Patient Name: Carlton Ritter  MRN: [id number]  Today's Date: 12/2/2021    Admit Date: 11/15/2021     Discharge Plan     Row Name 12/02/21 1119       Plan    Plan unclear    Final Note Patient remains on vent, needs/plan unclear.               Discharge Codes    No documentation.                     ALYSSA JacobsonW

## 2021-12-02 NOTE — NURSING NOTE
Attempted to turn pt to right side and he did not tolerate o2 sats into the -high  70's. Pt was repositioned then to his left side with o2 sat increase to 87-88% at this time.

## 2021-12-02 NOTE — PROGRESS NOTES
INFECTIOUS DISEASES PROGRESS NOTE    Patient:  Carlton Ritter  YOB: 1950  MRN: [id number]   Admit date: 11/15/2021   Admitting Physician: Julito Main MD  Primary Care Physician: Jose Luciano MD      Chief Complaint: Unobtainable from patient on ventilator      Interval History: Patient remains on maximal ventilator settings.  It took him about an hour yesterday to improve his sats after being turned and cleaned up.  THERESA Hale weaned his sedation a little bit and he did start moving appropriately.  THERESA Hale also noted he did better on his left side yesterday however he is not tolerating either side currently    Last night he had desaturations into the 70s with turning requiring suction and bagging.        Allergies: No Known Allergies    Current Meds:     Current Facility-Administered Medications:   •  acetaminophen (TYLENOL) tablet 650 mg, 650 mg, Oral, Q4H PRN, 650 mg at 11/24/21 0806 **OR** acetaminophen (TYLENOL) suppository 650 mg, 650 mg, Rectal, Q4H PRN, Nikolas Gray MD  •  acetaminophen (TYLENOL) tablet 650 mg, 650 mg, Oral, Q4H PRN **OR** acetaminophen (TYLENOL) suppository 650 mg, 650 mg, Rectal, Q4H PRN, Nikolas Gray MD  •  albuterol sulfate HFA (PROVENTIL HFA;VENTOLIN HFA;PROAIR HFA) inhaler 2 puff, 2 puff, Inhalation, Q4H - RT, Nikolas Gray MD, 2 puff at 12/02/21 0721  •  artificial tears ophthalmic ointment, , Both Eyes, Q4H, Charles Hamilton MD, Given at 12/02/21 0607  •  ascorbic acid (VITAMIN C) tablet 500 mg, 500 mg, Oral, Daily, Nikolas Gray MD, 500 mg at 12/01/21 0806  •  atorvastatin (LIPITOR) tablet 10 mg, 10 mg, Oral, Nightly, Nikolas Gray MD, 10 mg at 12/01/21 2008  •  benzonatate (TESSALON) capsule 200 mg, 200 mg, Oral, Q4H PRN, Nikolas Gray MD, 200 mg at 11/24/21 0807  •  budesonide-formoterol (SYMBICORT) 160-4.5 MCG/ACT inhaler 2 puff, 2 puff, Inhalation, BID - RT, Aliya Fam APRN, 2 puff at 12/02/21 0721  •   cholecalciferol (VITAMIN D3) tablet 1,000 Units, 1,000 Units, Oral, Daily, Nikolas Gray MD, 1,000 Units at 12/01/21 0806  •  dexamethasone (DECADRON) tablet 6 mg, 6 mg, Oral, BID, 6 mg at 12/01/21 0806 **OR** dexamethasone (DECADRON) injection 6 mg, 6 mg, Intravenous, BID, Markus Hamilton MD, 6 mg at 12/01/21 2008  •  dextromethorphan polistirex ER (DELSYM) 30 MG/5ML oral suspension 60 mg, 60 mg, Oral, Q12H PRN, Nikolas Gray MD, 60 mg at 11/23/21 2011  •  dextrose (D50W) (25 g/50 mL) IV injection 25 g, 25 g, Intravenous, Q15 Min PRN, Nikolas Gray MD  •  dextrose (D50W) (25 g/50 mL) IV injection 50 mL, 50 mL, Intravenous, Q1H PRN, Aravind Landon MD, 50 mL at 11/26/21 1235  •  dextrose (GLUTOSE) oral gel 15 g, 15 g, Oral, Q15 Min PRN, Nikolas Gray MD  •  enoxaparin (LOVENOX) syringe 90 mg, 1 mg/kg, Subcutaneous, Q12H, Julito Main MD, 90 mg at 12/01/21 2008  •  famotidine (PEPCID) tablet 20 mg, 20 mg, Oral, Daily, Aravind Landon MD, 20 mg at 12/01/21 0806  •  fenofibrate (TRICOR) tablet 48 mg, 48 mg, Oral, Daily, Aravind Landon MD, 48 mg at 12/01/21 0806  •  fentaNYL 2500 mcg/250 mL NS infusion,  mcg/hr, Intravenous, Titrated, Julito Main MD, Last Rate: 20 mL/hr at 12/02/21 0352, 200 mcg/hr at 12/02/21 0352  •  glucagon (human recombinant) (GLUCAGEN DIAGNOSTIC) injection 1 mg, 1 mg, Subcutaneous, Q15 Min PRN, Nikolas Gray MD  •  guaiFENesin (ROBITUSSIN) 100 MG/5ML oral solution 200 mg, 200 mg, Oral, TID, Aliya Fam, APRN, 200 mg at 12/01/21 2008  •  insulin lispro (humaLOG) injection 0-9 Units, 0-9 Units, Subcutaneous, Q6H, Aravind Landon MD, 4 Units at 12/02/21 0611  •  melatonin tablet 3 mg, 3 mg, Oral, Nightly, Nikolas Gray MD, 3 mg at 12/01/21 2008  •  multivitamin with minerals 1 tablet, 1 tablet, Oral, Daily, Nikolas Gray MD, 1 tablet at 12/01/21 0806  •  norepinephrine (LEVOPHED) 8 mg in 250 mL NS infusion (premix), 0.02-0.3 mcg/kg/min,  "Intravenous, Titrated, Aravind Landon MD, Last Rate: 23.5 mL/hr at 21 0616, 0.14 mcg/kg/min at 21 0616  •  ondansetron (ZOFRAN) tablet 4 mg, 4 mg, Oral, Q6H PRN **OR** ondansetron (ZOFRAN) injection 4 mg, 4 mg, Intravenous, Q6H PRN, Nikolas Gray MD  •  piperacillin-tazobactam (ZOSYN) 4.5 g in iso-osmotic dextrose 100 mL IVPB (premix), 4.5 g, Intravenous, Q6H, Kelsey Zuñiga MD, 4.5 g at 21 06  •  polyethylene glycol (MIRALAX) packet 17 g, 17 g, Oral, Daily, Aravind Landon MD, 17 g at 21 08  •  propofol (DIPRIVAN) infusion 10 mg/mL 100 mL, 5-75 mcg/kg/min, Intravenous, Titrated, Aravind Landon MD, Last Rate: 35 mL/hr at 21, 65 mcg/kg/min at 21  •  rocuronium (ZEMURON) 500 mg in sodium chloride 0.9 % 100 mL infusion, 8-12 mcg/kg/min, Intravenous, Titrated, Charles Hamilton MD, Held at 21 0205  •  rocuronium (ZEMURON) injection 22.4 mg, 250 mcg/kg, Intravenous, Once PRN, Charles Hamilton MD  •  rocuronium (ZEMURON) injection 50 mg, 50 mg, Intravenous, Once, Aravind Landon MD  •  sodium chloride 0.45 % infusion, 75 mL/hr, Intravenous, Continuous, Mac Ng MD, Last Rate: 75 mL/hr at 21, 75 mL/hr at 21  •  sodium chloride nasal spray 2 spray, 2 spray, Each Nare, PRN, Kelsey Zuñiga MD  •  sodium zirconium cyclosilicate (LOKELMA) pack 10 g, 10 g, Nasogastric, Daily, Mac Ng MD, 10 g at 21 1255  •  thiamine (VITAMIN B-1) tablet 100 mg, 100 mg, Oral, Daily, Julito Main MD, 100 mg at 21 0807  •  zinc sulfate (ZINCATE) capsule 220 mg, 220 mg, Oral, Daily, Nikolas Gray MD, 220 mg at 21 0806      Review of Systems   Unable to perform ROS: Intubated       Objective     Vital Signs:  Temp (24hrs), Av.7 °F (37.1 °C), Min:98.1 °F (36.7 °C), Max:99.4 °F (37.4 °C)      /66   Pulse 103   Temp 98.8 °F (37.1 °C) (Axillary)   Resp (!) 32   Ht 172.7 cm (68\")   Wt 84.6 kg (186 lb 9.6 " oz)   SpO2 (!) 84%   BMI 28.37 kg/m²       Physical Exam  General: Patient was lying in bed in no acute distress.  Discussed with THERESA Hale  HEENT: ET tube in place  Respiratory: Appears synchronous with the vent with right 32.  O2 sats 84 to 86% currently.  Neuro: Sedated on vent   Psych: Sedated on vent        Results Review:    I reviewed the patient's new clinical results.    Lab Results:  CBC:   Lab Results   Lab 11/26/21  0417 11/27/21  0236 11/28/21  0616 11/29/21  0554 11/30/21 0337 12/01/21 0349 12/02/21 0319   WBC 28.00* 46.77* 21.30* 17.44* 17.66* 13.77* 18.61*   HEMOGLOBIN 14.7 14.8 12.1* 11.3* 10.8* 10.0* 10.1*   HEMATOCRIT 45.2 46.5 38.3 36.4* 35.6* 33.1* 33.3*   PLATELETS 242 275 181 183 207 168 158   LYMPHOCYTE %  --  1.1*  --   --   --   --   --    MONOCYTES %  --  2.1*  --   --   --   --   --      >>>>>>>>>>>>>>>>>>>>>>>>>>>>>>>>>>>>>>>>>>>>>>>>>>>>>>>.86.6 N/ 5.2 L/4.3 mono  CMP:   Lab Results   Lab 11/30/21 0337 11/30/21 2022 12/01/21 0349 12/01/21 2035 12/02/21 0319   SODIUM 146*   < > 145 143 141   POTASSIUM 4.9   < > 5.5* 5.5* 5.8*   CHLORIDE 106   < > 107 105 104   CO2 32.0*   < > 31.0* 32.0* 31.0*   BUN 99*   < > 87* 86* 89*   CREATININE 1.17   < > 1.04 1.15 1.22   CALCIUM 7.4*   < > 7.3* 7.6* 7.6*   BILIRUBIN 0.5  --  0.5  --  0.5   ALK PHOS 79  --  62  --  63   ALT (SGPT) 26  --  28  --  37   AST (SGOT) 37  --  35  --  40   GLUCOSE 220*   < > 218* 142* 226*    < > = values in this interval not displayed.       COVID LABS:  Results From Last 14 Days   Lab Units 12/02/21  0319 12/01/21  0349 11/30/21  0337 11/29/21  0554 11/29/21  0554 11/28/21  0616 11/27/21  0236 11/24/21  0319 11/23/21  0307 11/21/21  0327 11/21/21  0327 11/20/21  0301 11/20/21  0301 11/19/21  0637 11/19/21  0230 11/19/21  0230   CRP mg/dL 1.65* 1.47* 2.94*   < > 5.53*   < > 2.02*   < > 0.68*   < > 1.62*   < > 2.40*  --    < > 4.96*   D DIMER QUANT mg/L (FEU)  --   --   --   --   --   --   --   --   --   --    --   --   --  >20.00*  --   --    FERRITIN ng/mL  --   --   --   --   --   --   --   --   --   --  1,115.00*  --  1,032.00*  --   --  1,088.00*   LDH U/L  --   --   --   --   --   --   --   --   --   --   --   --   --   --   --  967*   PROCALCITONIN ng/mL  --  0.13  --   --  0.27*  --  0.40*   < > 0.09   < > 0.11  --   --   --    < > 0.27*   PROTIME Seconds  --   --   --   --   --   --   --   --   --   --  14.8*  --   --  16.9*  --   --    INR   --   --   --   --   --   --   --   --   --   --  1.21*  --   --  1.43*  --   --    TRIGLYCERIDES mg/dL  --   --   --   --   --   --   --   --  324*  --   --   --   --   --   --   --     < > = values in this interval not displayed.         Estimated Creatinine Clearance: 60.5 mL/min (by C-G formula based on SCr of 1.22 mg/dL).    Culture Results:    Microbiology Results (last 10 days)     Procedure Component Value - Date/Time    Respiratory Culture - Sputum, ET Suction [[id number]]  (Abnormal)  (Susceptibility) Collected: 11/25/21 1045    Lab Status: Edited Result - FINAL Specimen: Sputum from ET Suction Updated: 11/28/21 2427     Respiratory Culture Light growth (2+) Pseudomonas aeruginosa      Light growth (2+) Klebsiella pneumoniae ssp pneumoniae      No Normal Respiratory Abdirahman     Gram Stain Greater than 25 WBCs per low power field      No Epithelial cells per low power field      Many (4+) Gram negative bacilli      Few (2+) Mixed gram positive abdirahman    Susceptibility      Pseudomonas aeruginosa     KG     Cefazolin Resistant (C)  [1]      Ceftazidime Susceptible     Ciprofloxacin Susceptible     Gentamicin Susceptible     Imipenem Susceptible (C)  [1]      Levofloxacin Susceptible     Meropenem Susceptible (C)  [1]      Piperacillin + Tazobactam Susceptible                 [1]  Appended report. These results have been appended to a previously final verified report.             Susceptibility      Klebsiella pneumoniae ssp pneumoniae     KG     Ampicillin Resistant      Ampicillin + Sulbactam Intermediate     Cefepime Susceptible     Ceftazidime Susceptible     Ceftriaxone Susceptible     Gentamicin Susceptible     Imipenem Susceptible (C)  [1]      Levofloxacin Intermediate     Piperacillin + Tazobactam Susceptible     Tetracycline Resistant     Trimethoprim + Sulfamethoxazole Susceptible                 [1]  Appended report. These results have been appended to a previously final verified report.             Susceptibility Comments     Pseudomonas aeruginosa    The previously reported component CEFEPIME is no longer being reported. Previous result was Susceptible (<=1 ug/ml) (Reference Range: [Reference Range]) on 11/28/2021 at 0827 CST.    Klebsiella pneumoniae ssp pneumoniae    Cefazolin sensitivity will not be reported for Enterobacteriaceae in non-urine isolates. If cefazolin is preferred, please call the microbiology lab to request an E-test.  With the exception of urinary-sourced infections, aminoglycosides should not be used as monotherapy.                        Radiology:               Imaging Results (Last 72 Hours)     ** No results found for the last 72 hours. **                    Assessment/Plan     Active Hospital Problems    Diagnosis    • **Pneumonia due to COVID-19 virus    • Acute respiratory failure with hypoxia (HCC)    • History of pulmonary embolism    • Hyperglycemia    • Essential hypertension    Acute respiratory failure secondary to COVID-19 infection patient had been on max BiPAP setting for several days.  Intubated November 24.  On FiO2 of 100%.  Possible superimposed bacterial pneumonia with  Pseudomonas aeruginosa and Klebsiella.  On Zosyn  Acute kidney injury-essentially resolved nephrology following  Markedly elevated CRP on admission.  Significantly improved however trended back up slightly  Diabetes mellitus per hemoglobin A1c of 6.7  New bilateral DVT in patient with history of DVT and PE in 2016.  Leukocytosis -improved and increased somewhat  today        RECOMMENDATION:   · Continue zosyn.  Increased dose to 4.5 g for Pseudomonas dosing given improved renal function.( Day #7 of abx -was on meropenem first)  · Continue dexamethasone-increased to twice daily November 24  · Wean norepinephrine as tolerated  · Vent management per pulmonary  ·  Adjuvant therapy per hospitalist  · Monitor white blood count  · Monitor CRP    Discussed with THERESA Hale MD  12/02/21  08:07 CST

## 2021-12-02 NOTE — PLAN OF CARE
Goal Outcome Evaluation:  Plan of Care Reviewed With: daughter           Outcome Summary: sats remained 90-92% today. lovenox resumed. urine output good. tolerating tube feeds with minimal residuals. bm today. remains on dolphin mattress see skin and wounds documentation. suction with thick creamy red streaked at times via ettube today and via subglottic. blood pressure has  tolerated weaning down of iv levo. weaned sedation both iv fentanyl gtt and propofol gtt's. pt will w/d to painful stimuli.

## 2021-12-02 NOTE — SIGNIFICANT NOTE
12/01/21 1814   Readings   PEEP Intrinsic (cm H2O) 14 cm H2O   Plateau Pressure (cm H2O) 38 cm H2O   Static Compliance (L/cm H2O) 20   Dynamic Compliance (L/cm H2O) 19 L/cm H2O

## 2021-12-02 NOTE — PROGRESS NOTES
PULMONARY AND CRITICAL CARE PROGRESS NOTE - [facility]    Patient: Carlton Ritter    1950    MR# [id number]    Acct# [id number]  12/02/21   10:29 CST  Referring Provider: Julito Main MD    Chief Complaint: Mechanically ventilated    Interval history: The patient is Covid positive and has been examined through the glass doors to prevent possible cross-contamination, unnecessary exposure and unnecessary use of PPE. The patient remains sedated and intubated with propofol, fentanyl, and Levophed infusing.  O2 sat 85% on 14 PEEP, 1.0 FiO2. Day #8 on the ventilator. No ABG or chest x-ray to review today. No other aggravating or alleviating factors.     Meds:  albuterol sulfate HFA, 2 puff, Inhalation, Q4H - RT  artificial tears, , Both Eyes, Q4H  ascorbic acid, 500 mg, Oral, Daily  atorvastatin, 10 mg, Oral, Nightly  budesonide-formoterol, 2 puff, Inhalation, BID - RT  cholecalciferol, 1,000 Units, Oral, Daily  dexamethasone, 6 mg, Oral, BID   Or  dexamethasone, 6 mg, Intravenous, BID  enoxaparin, 1 mg/kg, Subcutaneous, Q12H  famotidine, 20 mg, Oral, Daily  fenofibrate, 48 mg, Oral, Daily  guaiFENesin, 200 mg, Oral, TID  insulin lispro, 0-9 Units, Subcutaneous, Q6H  melatonin, 3 mg, Oral, Nightly  multivitamin with minerals, 1 tablet, Oral, Daily  piperacillin-tazobactam, 4.5 g, Intravenous, Q6H  polyethylene glycol, 17 g, Oral, Daily  rocuronium, 50 mg, Intravenous, Once  sodium zirconium cyclosilicate, 10 g, Nasogastric, Daily  thiamine, 100 mg, Oral, Daily  zinc sulfate, 220 mg, Oral, Daily      fentanyl 10 mcg/mL,  mcg/hr, Last Rate: 200 mcg/hr (12/02/21 0352)  norepinephrine, 0.02-0.3 mcg/kg/min, Last Rate: 0.14 mcg/kg/min (12/02/21 0616)  propofol, 5-75 mcg/kg/min, Last Rate: 65 mcg/kg/min (12/02/21 0910)  rocuronium (ZEMURON) infusion, 8-12 mcg/kg/min, Last Rate: Stopped (11/29/21 0205)  sodium chloride, 75 mL/hr, Last Rate: 75 mL/hr (12/01/21 2007)      Review of Systems:    Cannot obtain due to mechanical ventilated state    Ventilator Settings:        Resp Rate (Set): 32     FiO2 (%): 100 %  PEEP/CPAP (cm H2O): 14 cm H20  Minute Ventilation (L/min) (Obs): 15 L/min  Resp Rate (Observed) Vent: 34  I:E Ratio (Set): 1:1.5  I:E Ratio (Obs): 1:1.50  PIP Observed (cm H2O): 41 cm H2O     Physical Exam:  Temp:  [98.1 °F (36.7 °C)-99.4 °F (37.4 °C)] 98.8 °F (37.1 °C)  Heart Rate:  [] 103  Resp:  [32-34] 32  BP: ()/(53-96) 101/66  Arterial Line BP: ()/(40-76) 105/57  FiO2 (%):  [100 %] 100 %    Intake/Output Summary (Last 24 hours) at 12/2/2021 1029  Last data filed at 12/2/2021 0324  Gross per 24 hour   Intake 5349.55 ml   Output 1825 ml   Net 3524.55 ml     SpO2 Percentage    12/02/21 0715 12/02/21 0721 12/02/21 0730   SpO2: (!) 83% (!) 85% (!) 84%      GENERAL/CONSTITUTIONAL:  Pt is on vent. He appears very ill.  HEENT: atraumatic, normocephalic. Nutrition infusing to OG.  NOSE: normal  NECK: jugular veins nondistended  CHEST: no paradox, no retractions.  No respiratory distress.   Vent synchrony: yes  CARDIAC: normal rate  ABDOMEN: deferred  : deferred  EXTREMITIES: deferred  NEURO:  sedated, mechanically ventilated  SKIN: no jaundice.     Results from last 7 days   Lab Units 12/02/21  0319 12/01/21  0349 11/30/21  0337   WBC 10*3/mm3 18.61* 13.77* 17.66*   HEMOGLOBIN g/dL 10.1* 10.0* 10.8*   PLATELETS 10*3/mm3 158 168 207     Results from last 7 days   Lab Units 12/02/21  0319 12/01/21  2035 12/01/21  0349 11/29/21  0754 11/29/21  0554   SODIUM mmol/L 141 143 145   < > 144   POTASSIUM mmol/L 5.8* 5.5* 5.5*   < > 5.2   BUN mg/dL 89* 86* 87*   < > 110*   CREATININE mg/dL 1.22 1.15 1.04   < > 1.75*   CRP mg/dL 1.65*  --  1.47*   < > 5.53*   PROCALCITONIN ng/mL  --   --  0.13  --  0.27*    < > = values in this interval not displayed.     Results from last 7 days   Lab Units 11/30/21  0805 11/28/21  1447 11/28/21  0322   PH, ARTERIAL pH units 7.377 7.326* 7.326*   PCO2,  ARTERIAL mm Hg 60.4* 60.4* 56.9*   PO2 ART mm Hg 76.4* 48.9* 67.4*   FIO2 % 100 100 100     Respiratory Culture   Date Value Ref Range Status   11/25/2021 Light growth (2+) Pseudomonas species (A)  Preliminary   11/25/2021 Light growth (2+) Gram Negative Bacilli (A)  Preliminary   11/25/2021 No Normal Respiratory Michelle (A)  Preliminary     Recent films:  No radiology results for the last day  Films reviewed personally by me.  My interpretation: none to review today     Pulmonary Assessment:  Acute respiratory failure with ARDS, very poor lung compliance   Bilateral COVID-19 pneumonia, vaccinated patient. Hospitalized 11/15.  Status post Tocilizumab for high inflammatory markers and hypoxia  Pseudomonas growth in respiratory cultures  Steroid-induced hyperglycemia  Personal history of remote pulmonary embolism  Obesity, BMI 31  Acute kidney injury-due to acute tubular necrosis    Recommend:   D#8 ETT.  Continue mechanical ventilation titrate oxygen for saturation 90%. Remains on PEEP 14, FiO2 1.0.  Continue dvt prophylaxis-Lovenox  Continue stress ulcer prophylaxis while on vent-Pepcid  Proning candidate: No, he does not tolerate  Dexamethasone day 9 of 10; continue at BID dosing  ABG/chest x-ray as needed  Continue Symbicort and albuterol HFA  Continue Robitussin  Continue adjuvant therapy zinc, vitamin D, vitamin C  Continue antibiotic per ID -zosyn  Prognosis is poor    Electronically signed by KLAUS Jones on 12/2/2021 at 10:29 CST    Physician substantive portion:  We continue with him on maximal ventilatory support.  We have him in a situation with FiO2 airway pressures and PCO2 all higher than I would like them to be but no room to fix those problems with mechanical ventilation without exacerbating one of the others.  He is not a candidate for ECMO.  Respirations are unlabored.  He is sedated.  He is not jaundiced abdomen is nondistended.  Not a proning candidate.  He is at all other adjuvant  therapies.  Prognosis extremely poor.    I have seen and examined patient personally, performing a face-to-face diagnostic evaluation with plan of care reviewed and developed with APRN and nursing staff. I have addended and/or modified the above history of present illness, physical examination, and assessment and plan to reflect my findings and impressions. Essential elements of the care plan were discussed with APRN above.  Agree with findings and assessment/plan as documented above.    Electronically signed by Reji Bhagat MD, on 12/2/2021, 10:41 CST

## 2021-12-02 NOTE — PLAN OF CARE
Goal Outcome Evaluation:  Plan of Care Reviewed With: other (see comments)        Progress: no change  Outcome Summary: Ntn follow up. Pt remains on vent support and propofol sedation. Pt with NGUYỄN and K+ cont to increase. Renal MD requests TF formula be changed to a low K+ formula. TF to change to Novasource Renal at 25mL/hour. To meet protein needs, will need to dissolve 3 packets of beneprotein in 200mL water and flush via NG tube TID. Will cont to receive routine water flushes of 60mL/hour via pump. Will follow to establish TF tolerance with change.

## 2021-12-02 NOTE — PROGRESS NOTES
Nephrology ([facility]) Progress Note      Patient:  Carlton Ritter  YOB: 1950  Date of Service: 12/2/2021  MRN: [id number]   Acct: [id number]   Primary Care Physician: Jose Luciano MD  Advance Directive:   Code Status and Medical Interventions:   Ordered at: 11/16/21 0054     Level Of Support Discussed With:    Patient     Code Status (Patient has no pulse and is not breathing):    CPR (Attempt to Resuscitate)     Medical Interventions (Patient has pulse or is breathing):    Full Support     Admit Date: 11/15/2021       Hospital Day: 17  Referring Provider: No Known Provider      Patient personally seen and examined.  Complete chart including Consults, Notes, Operative Reports, Labs, Cardiology, and Radiology studies reviewed as able.        Subjective:  Carlton Ritter is a 71 y.o. male for whom we were consulted for evaluation and treatment of acute kidney injury and hyperkalemia.  Admitted on 11/15 with COVID-19 pneumonia. Required intubation on 11/24 due to worsening hypoxia.  Levophed also started on 11/24. Starting on 11/24 creatinine and potassium levels started climbing. Patient started on TID Lokelma on 11/25.  Remaining persistently hyperkalemic, but creatinine and urine output have improved    Today remains intubated and sedated. Remains on Levophed drip. Urine output nonoliguric.  Limited physical exam due to COVID isolation. Discussed with nursing staff    Allergies:  Patient has no known allergies.    Home Meds:  Medications Prior to Admission   Medication Sig Dispense Refill Last Dose   • apixaban (ELIQUIS) 5 MG tablet tablet Take 1 tablet by mouth Every 12 (Twelve) Hours. 60 tablet 2 Past Week at Unknown time   • ezetimibe (ZETIA) 10 MG tablet Take 10 mg by mouth Daily.   Past Week at Unknown time   • olmesartan (BENICAR) 20 MG tablet Take 40 mg by mouth Daily.   Past Week at Unknown time       Medicines:  Current Facility-Administered Medications    Medication Dose Route Frequency Provider Last Rate Last Admin   • acetaminophen (TYLENOL) tablet 650 mg  650 mg Oral Q4H PRN Nikolas Gray MD   650 mg at 11/24/21 0806    Or   • acetaminophen (TYLENOL) suppository 650 mg  650 mg Rectal Q4H PRN Nikolas Gray MD       • acetaminophen (TYLENOL) tablet 650 mg  650 mg Oral Q4H PRN Nikolas Gray MD        Or   • acetaminophen (TYLENOL) suppository 650 mg  650 mg Rectal Q4H PRN Nikolas Gray MD       • albuterol sulfate HFA (PROVENTIL HFA;VENTOLIN HFA;PROAIR HFA) inhaler 2 puff  2 puff Inhalation Q4H - RT Nikolas Gray MD   2 puff at 12/02/21 0721   • artificial tears ophthalmic ointment   Both Eyes Q4H Charles Hamilton MD   Given at 12/02/21 0607   • ascorbic acid (VITAMIN C) tablet 500 mg  500 mg Oral Daily Nikolas Gray MD   500 mg at 12/02/21 0910   • atorvastatin (LIPITOR) tablet 10 mg  10 mg Oral Nightly Nikolas Gray MD   10 mg at 12/01/21 2008   • benzonatate (TESSALON) capsule 200 mg  200 mg Oral Q4H PRN Nikolas Gray MD   200 mg at 11/24/21 0807   • budesonide-formoterol (SYMBICORT) 160-4.5 MCG/ACT inhaler 2 puff  2 puff Inhalation BID - RT Aliya Fam APRN   2 puff at 12/02/21 0721   • cholecalciferol (VITAMIN D3) tablet 1,000 Units  1,000 Units Oral Daily Nikolas Gray MD   1,000 Units at 12/02/21 0910   • dexamethasone (DECADRON) tablet 6 mg  6 mg Oral BID Markus Hamilton MD   6 mg at 12/01/21 0806    Or   • dexamethasone (DECADRON) injection 6 mg  6 mg Intravenous BID Markus Hamilton MD   6 mg at 12/02/21 0910   • dextromethorphan polistirex ER (DELSYM) 30 MG/5ML oral suspension 60 mg  60 mg Oral Q12H PRN Nikolas Gray MD   60 mg at 11/23/21 2011   • dextrose (D50W) (25 g/50 mL) IV injection 25 g  25 g Intravenous Q15 Min PRN Nikolas Gray MD       • dextrose (D50W) (25 g/50 mL) IV injection 50 mL  50 mL Intravenous Q1H PRAravind Panchal MD   50 mL at 11/26/21 1235   • dextrose (GLUTOSE) oral gel 15 g   15 g Oral Q15 Min PRN Nikolas Gray MD       • enoxaparin (LOVENOX) syringe 90 mg  1 mg/kg Subcutaneous Q12H Julito Main MD   90 mg at 12/02/21 0911   • famotidine (PEPCID) tablet 20 mg  20 mg Oral Daily Aravind Landon MD   20 mg at 12/02/21 0911   • fenofibrate (TRICOR) tablet 48 mg  48 mg Oral Daily Aravind Landon MD   48 mg at 12/02/21 0911   • fentaNYL 2500 mcg/250 mL NS infusion   mcg/hr Intravenous Titrated Julito Main MD 20 mL/hr at 12/02/21 0352 200 mcg/hr at 12/02/21 0352   • glucagon (human recombinant) (GLUCAGEN DIAGNOSTIC) injection 1 mg  1 mg Subcutaneous Q15 Min PRN Nikolas Gray MD       • guaiFENesin (ROBITUSSIN) 100 MG/5ML oral solution 200 mg  200 mg Oral TID Aliya Fam APRN   200 mg at 12/02/21 0910   • insulin lispro (humaLOG) injection 0-9 Units  0-9 Units Subcutaneous Q6H Aravind Landon MD   4 Units at 12/02/21 0611   • melatonin tablet 3 mg  3 mg Oral Nightly Nikolas Gray MD   3 mg at 12/01/21 2008   • multivitamin with minerals 1 tablet  1 tablet Oral Daily Nikolas Gray MD   1 tablet at 12/02/21 0910   • norepinephrine (LEVOPHED) 8 mg in 250 mL NS infusion (premix)  0.02-0.3 mcg/kg/min Intravenous Titrated Aravind Landon MD 23.5 mL/hr at 12/02/21 0616 0.14 mcg/kg/min at 12/02/21 0616   • ondansetron (ZOFRAN) tablet 4 mg  4 mg Oral Q6H PRN Nikolas Gray MD        Or   • ondansetron (ZOFRAN) injection 4 mg  4 mg Intravenous Q6H PRN Nikolas Gray MD       • piperacillin-tazobactam (ZOSYN) 4.5 g in iso-osmotic dextrose 100 mL IVPB (premix)  4.5 g Intravenous Q6H Kelsey Zuñiga MD   4.5 g at 12/02/21 0607   • polyethylene glycol (MIRALAX) packet 17 g  17 g Oral Daily Aravind Landon MD   17 g at 12/01/21 0806   • propofol (DIPRIVAN) infusion 10 mg/mL 100 mL  5-75 mcg/kg/min Intravenous Titrated Aravind Landon MD 35 mL/hr at 12/02/21 0910 65 mcg/kg/min at 12/02/21 0910   • rocuronium (ZEMURON) 500 mg in sodium chloride  0.9 % 100 mL infusion  8-12 mcg/kg/min Intravenous Titrated Charles Hamilton MD   Held at 11/29/21 0205   • rocuronium (ZEMURON) injection 22.4 mg  250 mcg/kg Intravenous Once PRN Charles Hamilton MD       • rocuronium (ZEMURON) injection 50 mg  50 mg Intravenous Once Aravind Landon MD       • sodium chloride 0.45 % infusion  75 mL/hr Intravenous Continuous Mac Ng MD 75 mL/hr at 12/01/21 2007 75 mL/hr at 12/01/21 2007   • sodium chloride nasal spray 2 spray  2 spray Each Nare PRN Kelsey Zuñiga MD       • sodium zirconium cyclosilicate (LOKELMA) pack 10 g  10 g Nasogastric Daily Mac Ng MD   10 g at 12/02/21 0912   • thiamine (VITAMIN B-1) tablet 100 mg  100 mg Oral Daily Julito Main MD   100 mg at 12/02/21 0910   • zinc sulfate (ZINCATE) capsule 220 mg  220 mg Oral Daily Nikolas Gray MD   220 mg at 12/02/21 0911       Past Medical History:  Past Medical History:   Diagnosis Date   • Acute saddle pulmonary embolism with acute cor pulmonale (HCC)    • Hyperlipidemia    • Hypertension        Past Surgical History:  Past Surgical History:   Procedure Laterality Date   • CARDIAC CATHETERIZATION N/A 10/21/2016    Procedure: Thrombolytic Therapy;  Surgeon: Andrew Akins MD;  Location:  [facility];  Service:    • EKOS CATHETER REMOVAL Right 10/22/2016    Procedure: Ekos catheter removal with stent placement;  Surgeon: Percy Chavarria MD;  Location:  [facility];  Service:        Family History  Family History   Problem Relation Age of Onset   • Heart disease Mother    • Hypertension Mother    • Heart disease Father    • Hypertension Brother        Social History  Social History     Socioeconomic History   • Marital status:    Tobacco Use   • Smoking status: Never Smoker   • Tobacco comment: Wife is surrogate decision-maker   Substance and Sexual Activity   • Alcohol use: No   • Drug use: No   • Sexual activity: Defer         Review of  Systems:  Unable to obtain due to intubated and sedated    Objective:  Patient Vitals for the past 24 hrs:   BP Temp Temp src Pulse Resp SpO2 Weight   12/02/21 0730 -- -- -- 103 -- (!) 84 % --   12/02/21 0721 -- -- -- 104 (!) 32 (!) 85 % --   12/02/21 0715 -- 98.8 °F (37.1 °C) Axillary 106 -- (!) 83 % --   12/02/21 0700 101/66 -- -- 104 -- (!) 85 % --   12/02/21 0645 -- -- -- 104 -- (!) 85 % --   12/02/21 0630 -- -- -- 104 -- (!) 86 % --   12/02/21 0615 -- -- -- 108 -- (!) 82 % --   12/02/21 0600 107/73 -- -- 106 -- (!) 86 % --   12/02/21 0545 -- -- -- 104 -- (!) 87 % --   12/02/21 0530 -- -- -- 105 -- (!) 85 % --   12/02/21 0515 -- -- -- 106 -- (!) 84 % --   12/02/21 0500 115/63 -- -- 108 -- (!) 84 % --   12/02/21 0445 -- -- -- 104 -- (!) 86 % --   12/02/21 0430 -- -- -- 104 -- (!) 85 % --   12/02/21 0415 -- -- -- 104 -- (!) 85 % --   12/02/21 0400 111/67 -- -- 101 -- (!) 85 % --   12/02/21 0345 -- -- -- 100 -- (!) 84 % --   12/02/21 0330 -- -- -- 98 -- (!) 84 % 84.6 kg (186 lb 9.6 oz)   12/02/21 0323 -- 98.1 °F (36.7 °C) Axillary -- -- -- --   12/02/21 0315 -- -- -- 98 -- (!) 87 % --   12/02/21 0307 -- -- -- 101 (!) 32 (!) 82 % --   12/02/21 0303 -- -- -- 102 (!) 32 (!) 72 % --   12/02/21 0300 115/62 -- -- 100 -- (!) 81 % --   12/02/21 0245 -- -- -- 95 -- (!) 72 % --   12/02/21 0230 -- -- -- 96 -- (!) 84 % --   12/02/21 0215 -- -- -- 96 -- (!) 85 % --   12/02/21 0200 100/67 -- -- 96 -- (!) 84 % --   12/02/21 0145 -- -- -- 96 -- (!) 84 % --   12/02/21 0130 -- -- -- 97 -- (!) 84 % --   12/02/21 0115 -- -- -- 94 -- (!) 84 % --   12/02/21 0100 113/70 -- -- 93 -- (!) 85 % --   12/02/21 0045 -- -- -- 93 -- (!) 84 % --   12/02/21 0030 -- -- -- 95 -- (!) 83 % --   12/02/21 0015 -- -- -- 96 -- (!) 83 % --   12/02/21 0000 106/65 -- -- 93 -- (!) 82 % --   12/01/21 2345 -- -- -- 92 -- (!) 82 % --   12/01/21 2330 -- -- -- 93 -- (!) 83 % --   12/01/21 2325 -- 98.7 °F (37.1 °C) Axillary -- -- -- --   12/01/21 2317 -- -- -- 98  (!) 32 (!) 80 % --   12/01/21 2315 -- -- -- 95 -- (!) 84 % --   12/01/21 2313 -- -- -- 95 (!) 34 (!) 83 % --   12/01/21 2300 113/58 -- -- 98 -- (!) 84 % --   12/01/21 2245 -- -- -- 94 -- (!) 85 % --   12/01/21 2230 -- -- -- 94 -- (!) 87 % --   12/01/21 2215 -- -- -- 92 -- (!) 87 % --   12/01/21 2200 107/59 -- -- 89 -- (!) 87 % --   12/01/21 2145 -- -- -- 88 -- (!) 87 % --   12/01/21 2130 -- -- -- 89 -- (!) 84 % --   12/01/21 2115 -- -- -- 86 -- (!) 84 % --   12/01/21 2100 101/61 -- -- 82 -- (!) 87 % --   12/01/21 2045 -- -- -- 87 -- (!) 88 % --   12/01/21 2030 -- -- -- 91 -- 92 % --   12/01/21 2015 -- -- -- 91 -- 92 % --   12/01/21 2000 92/61 98.2 °F (36.8 °C) Axillary 94 -- 91 % --   12/01/21 1930 -- -- -- 95 -- 90 % --   12/01/21 1915 -- -- -- 96 -- 90 % --   12/01/21 1900 (!) 89/58 -- -- 91 -- 91 % --   12/01/21 1830 -- -- -- 92 -- 91 % --   12/01/21 1822 -- -- -- 93 -- 91 % --   12/01/21 1815 -- -- -- 92 -- 90 % --   12/01/21 1814 -- -- -- 91 (!) 32 92 % --   12/01/21 1800 (!) 89/56 -- -- 94 -- 90 % --   12/01/21 1745 -- -- -- 91 -- 92 % --   12/01/21 1730 -- -- -- 88 -- 92 % --   12/01/21 1715 -- -- -- 89 -- 92 % --   12/01/21 1700 96/53 -- -- 93 -- 91 % --   12/01/21 1645 -- -- -- 91 -- 91 % --   12/01/21 1630 -- -- -- 92 -- 92 % --   12/01/21 1615 -- -- -- 93 -- 90 % --   12/01/21 1600 119/96 -- -- 96 -- (!) 88 % --   12/01/21 1545 -- 99.1 °F (37.3 °C) Axillary 93 -- 90 % --   12/01/21 1530 -- -- -- 90 -- 91 % --   12/01/21 1521 -- -- -- 89 (!) 32 91 % --   12/01/21 1519 -- -- -- 89 (!) 32 91 % --   12/01/21 1515 -- -- -- 91 -- 91 % --   12/01/21 1500 92/56 -- -- 93 -- (!) 88 % --   12/01/21 1445 -- -- -- 89 -- 90 % --   12/01/21 1430 -- -- -- 89 -- 91 % --   12/01/21 1415 -- -- -- 90 -- 90 % --   12/01/21 1400 100/59 -- -- 96 -- 90 % --   12/01/21 1345 -- -- -- 97 -- (!) 89 % --   12/01/21 1330 -- -- -- 91 -- (!) 89 % --   12/01/21 1315 -- -- -- 89 -- 92 % --   12/01/21 1300 95/64 -- -- 89 -- 92 % --    12/01/21 1245 -- -- -- 89 -- 92 % --   12/01/21 1237 -- -- -- 90 -- 92 % --   12/01/21 1233 -- -- -- 90 -- 90 % --   12/01/21 1228 -- -- -- 94 -- 90 % --   12/01/21 1227 -- -- -- 91 -- 90 % --   12/01/21 1200 117/62 99.4 °F (37.4 °C) Axillary 95 -- (!) 86 % --   12/01/21 1130 -- -- -- 92 -- (!) 88 % --   12/01/21 1100 109/59 -- -- 88 -- 90 % --   12/01/21 1035 -- -- -- 88 (!) 32 (!) 86 % --   12/01/21 1032 -- -- -- 88 (!) 32 (!) 84 % --   12/01/21 1030 -- -- -- 87 -- (!) 85 % --   12/01/21 1000 113/68 -- -- 85 -- (!) 86 % --   12/01/21 0930 -- -- -- 78 -- (!) 87 % --       Intake/Output Summary (Last 24 hours) at 12/2/2021 0923  Last data filed at 12/2/2021 0324  Gross per 24 hour   Intake 5349.55 ml   Output 1825 ml   Net 3524.55 ml     General: intubated, sedated  Chest:  equal chest rise  CVS: regular rate and rhythm  Abdominal: soft, nontender, positive bowel sounds  Extremities: no cyanosis or edema  Skin: warm and dry without rash      Labs:  Results from last 7 days   Lab Units 12/02/21 0319 12/01/21 0349 11/30/21  0337   WBC 10*3/mm3 18.61* 13.77* 17.66*   HEMOGLOBIN g/dL 10.1* 10.0* 10.8*   HEMATOCRIT % 33.3* 33.1* 35.6*   PLATELETS 10*3/mm3 158 168 207         Results from last 7 days   Lab Units 12/02/21 0319 12/01/21 2035 12/01/21 0349 11/30/21 2022 11/30/21  0337   SODIUM mmol/L 141 143 145   < > 146*   POTASSIUM mmol/L 5.8* 5.5* 5.5*   < > 4.9   CHLORIDE mmol/L 104 105 107   < > 106   CO2 mmol/L 31.0* 32.0* 31.0*   < > 32.0*   BUN mg/dL 89* 86* 87*   < > 99*   CREATININE mg/dL 1.22 1.15 1.04   < > 1.17   CALCIUM mg/dL 7.6* 7.6* 7.3*   < > 7.4*   BILIRUBIN mg/dL 0.5  --  0.5  --  0.5   ALK PHOS U/L 63  --  62  --  79   ALT (SGPT) U/L 37  --  28  --  26   AST (SGOT) U/L 40  --  35  --  37   GLUCOSE mg/dL 226* 142* 218*   < > 220*    < > = values in this interval not displayed.       Radiology:   Imaging Results (Last 72 Hours)     ** No results found for the last 72 hours. **           Culture:  Respiratory Culture   Date Value Ref Range Status   11/25/2021 Light growth (2+) Pseudomonas aeruginosa (A)  Final   11/25/2021 (A)  Final    Light growth (2+) Klebsiella pneumoniae ssp pneumoniae   11/25/2021 No Normal Respiratory Michelle (A)  Final         Assessment   1.  Acute kidney injury, ATN--resolved  2.  Hyperkalemia  3.  COVID-19 pneumonia  4.  Acute respiratory failure--intubated  5.  Metabolic alkalosis  6.  Hypernatremia--improved    Plan:  1.  Decrease rate IV fluids  2.  Potassium persistently elevated. Patient given additional dose of Lokelma just prior to exam. Repeat BMP at 1300. Need to use tube feeding formula with lower potassium content if one is available        Mitesh Huff, APRN  12/2/2021  09:23 CST

## 2021-12-02 NOTE — PLAN OF CARE
Goal Outcome Evaluation:           Progress: declining  Outcome Summary: Pt remains intubated and sedated. Turned pt at 2030, sats dropped into 70s, then came back up into mid 80s after a couple of hours after lavaging and suctioning.  Pt. Tolerated 2200 and 0000 turns. After turning pt again at 0300, sats dropped into low 70s, lavaged and suctioned again, sats increased to 80. Increased peep to 16 until sats reached mid 80s, then turned peep  back down to 14. Sats are currently running mid 80s. Prop at 65, fentanyl at 200, levo at 0.14. Afebrile, good UOP. No signs of bleeding. Continue to monitor.

## 2021-12-02 NOTE — PROGRESS NOTES
Palliative Medicine Note    CC: intubated and sedated    History:  Carlton Ritter is a 71 y.o. male   Sats worse in the high 80s today still on PEEP of 14 and 100% FiO2. Urine remains without hematuria with Lovenox restarted and renal function stable.     ROS:  Review of Systems   Unable to perform ROS: Intubated         Current Facility-Administered Medications:   •  acetaminophen (TYLENOL) tablet 650 mg, 650 mg, Oral, Q4H PRN, 650 mg at 11/24/21 0806 **OR** acetaminophen (TYLENOL) suppository 650 mg, 650 mg, Rectal, Q4H PRN, Nikolas Gray MD  •  acetaminophen (TYLENOL) tablet 650 mg, 650 mg, Oral, Q4H PRN **OR** acetaminophen (TYLENOL) suppository 650 mg, 650 mg, Rectal, Q4H PRN, Nikolas Gray MD  •  albuterol sulfate HFA (PROVENTIL HFA;VENTOLIN HFA;PROAIR HFA) inhaler 2 puff, 2 puff, Inhalation, Q4H - RT, Nikolas Gray MD, 2 puff at 12/02/21 1422  •  artificial tears ophthalmic ointment, , Both Eyes, Q4H, Charles Hamilton MD, Given at 12/02/21 1504  •  ascorbic acid (VITAMIN C) tablet 500 mg, 500 mg, Oral, Daily, Nikolas Gray MD, 500 mg at 12/02/21 0910  •  atorvastatin (LIPITOR) tablet 10 mg, 10 mg, Oral, Nightly, Nikolas Gray MD, 10 mg at 12/01/21 2008  •  Beneprotein packet 3 packet, 3 packet, Per G Tube, TID, Julito Main MD, 3 packet at 12/02/21 1637  •  benzonatate (TESSALON) capsule 200 mg, 200 mg, Oral, Q4H PRN, Nikolas Gray MD, 200 mg at 11/24/21 0807  •  budesonide-formoterol (SYMBICORT) 160-4.5 MCG/ACT inhaler 2 puff, 2 puff, Inhalation, BID - RT, Aliya Fam APRN, 2 puff at 12/02/21 0721  •  cholecalciferol (VITAMIN D3) tablet 1,000 Units, 1,000 Units, Oral, Daily, Nikolas Gray MD, 1,000 Units at 12/02/21 0910  •  dexamethasone (DECADRON) tablet 6 mg, 6 mg, Oral, BID, 6 mg at 12/01/21 0806 **OR** dexamethasone (DECADRON) injection 6 mg, 6 mg, Intravenous, BID, Markus Hamilton MD, 6 mg at 12/02/21 0910  •  dextromethorphan polistirex ER (DELSYM) 30  MG/5ML oral suspension 60 mg, 60 mg, Oral, Q12H PRN, Nikolas Gray MD, 60 mg at 11/23/21 2011  •  dextrose (D50W) (25 g/50 mL) IV injection 25 g, 25 g, Intravenous, Q15 Min PRN, Nikolas Gray MD  •  dextrose (D50W) (25 g/50 mL) IV injection 50 mL, 50 mL, Intravenous, Q1H PRN, Aravind Landon MD, 50 mL at 11/26/21 1235  •  dextrose (GLUTOSE) oral gel 15 g, 15 g, Oral, Q15 Min PRN, Nikolas Gray MD  •  enoxaparin (LOVENOX) syringe 90 mg, 1 mg/kg, Subcutaneous, Q12H, Julito Main MD, 90 mg at 12/02/21 0911  •  famotidine (PEPCID) tablet 20 mg, 20 mg, Oral, Daily, Aravind Landon MD, 20 mg at 12/02/21 0911  •  fenofibrate (TRICOR) tablet 48 mg, 48 mg, Oral, Daily, Aravind Landon MD, 48 mg at 12/02/21 0911  •  fentaNYL 2500 mcg/250 mL NS infusion,  mcg/hr, Intravenous, Titrated, Julito Main MD, Last Rate: 20 mL/hr at 12/02/21 0352, 200 mcg/hr at 12/02/21 0352  •  glucagon (human recombinant) (GLUCAGEN DIAGNOSTIC) injection 1 mg, 1 mg, Subcutaneous, Q15 Min PRN, Nikolas Gray MD  •  guaiFENesin (ROBITUSSIN) 100 MG/5ML oral solution 200 mg, 200 mg, Oral, TID, Aliya Fam, APRN, 200 mg at 12/02/21 1637  •  insulin lispro (humaLOG) injection 0-9 Units, 0-9 Units, Subcutaneous, Q6H, Aravind Landon MD, 2 Units at 12/02/21 1706  •  melatonin tablet 3 mg, 3 mg, Oral, Nightly, Nikolas Gray MD, 3 mg at 12/01/21 2008  •  multivitamin with minerals 1 tablet, 1 tablet, Oral, Daily, Nikolas Gray MD, 1 tablet at 12/02/21 0910  •  norepinephrine (LEVOPHED) 8 mg in 250 mL NS infusion (premix), 0.02-0.3 mcg/kg/min, Intravenous, Titrated, Aravind Landon MD, Last Rate: 20.2 mL/hr at 12/02/21 1308, 0.12 mcg/kg/min at 12/02/21 1308  •  ondansetron (ZOFRAN) tablet 4 mg, 4 mg, Oral, Q6H PRN **OR** ondansetron (ZOFRAN) injection 4 mg, 4 mg, Intravenous, Q6H PRN, Nikolas Gray MD  •  piperacillin-tazobactam (ZOSYN) 4.5 g in iso-osmotic dextrose 100 mL IVPB (premix), 4.5 g,  "Intravenous, Q6H, Kelsey Zuñiga MD, 4.5 g at 12/02/21 1706  •  polyethylene glycol (MIRALAX) packet 17 g, 17 g, Oral, Daily, Aravind Landon MD, 17 g at 12/01/21 0806  •  propofol (DIPRIVAN) infusion 10 mg/mL 100 mL, 5-75 mcg/kg/min, Intravenous, Titrated, Aravind Landon MD, Last Rate: 32.3 mL/hr at 12/02/21 1504, 60 mcg/kg/min at 12/02/21 1504  •  rocuronium (ZEMURON) 500 mg in sodium chloride 0.9 % 100 mL infusion, 8-12 mcg/kg/min, Intravenous, Titrated, Charles Hamilton MD, Held at 11/29/21 0205  •  rocuronium (ZEMURON) injection 22.4 mg, 250 mcg/kg, Intravenous, Once PRN, Charles Hamilton MD  •  rocuronium (ZEMURON) injection 50 mg, 50 mg, Intravenous, Once, Aravind Landon MD  •  sodium chloride 0.45 % infusion, 75 mL/hr, Intravenous, Continuous, Mac Ng MD, Last Rate: 75 mL/hr at 12/02/21 1308, 75 mL/hr at 12/02/21 1308  •  sodium chloride nasal spray 2 spray, 2 spray, Each Nare, PRN, Kelsey Zuñiga MD  •  sodium zirconium cyclosilicate (LOKELMA) pack 10 g, 10 g, Nasogastric, Daily, Mac Ng MD, 10 g at 12/02/21 0912  •  thiamine (VITAMIN B-1) tablet 100 mg, 100 mg, Oral, Daily, Julito Main MD, 100 mg at 12/02/21 0910  •  zinc sulfate (ZINCATE) capsule 220 mg, 220 mg, Oral, Daily, Nikolas Gray MD, 220 mg at 12/02/21 0911      OBJECTIVE:  /66   Pulse 100   Temp 98 °F (36.7 °C) (Axillary)   Resp (!) 32   Ht 172.7 cm (68\")   Wt 84.6 kg (186 lb 9.6 oz)   SpO2 91%   BMI 28.37 kg/m²    Physical Exam  Constitutional:       Appearance: He is ill-appearing.   HENT:      Head: Normocephalic and atraumatic.   Cardiovascular:      Rate and Rhythm: Normal rate.   Pulmonary:      Effort: No respiratory distress.     Exam performed from outside the glass-doored room due to COVID positive to prevent cross-contamination, unnecessary exposure and/or unneeded PPE use.       Assessment:  - Pneumonia due to COVID 19 virus  - Acute respiratory failure with hypoxia- Day 5 MV  - " History of PE-on anticoagulation  - DVT on Lovenox  - Hyperglycemia  - NGUYỄN with ATN  - Hyperkalemia  - Gross hematuria     PPS  Unable to assess due to intubation     Symptoms     1. Weakness  2. Debility     Plan    1. Attempted to reach his wife by phone at 1732 as well as noted attempts by CSW earlier today. No answer for either of us. Saturations worse today on continued maximal support. Prognosis is very guarded given lack of improvement and some worsening over several days. He is not to the point of discussion toward tracheostomy, but discussions as to whether or not he would want that will be initiated by me to determine whether that is a potential direction he would want to go.     2. We will again try to reach his wife tomorrow.     Patient was discussed in Palliative Interdisciplinary Team Meeting.    Hernesto Moore D.O. 12/2/2021   Palliative Medicine

## 2021-12-02 NOTE — PROGRESS NOTES
[facility] Medicine Services  INPATIENT PROGRESS NOTE    Patient Name: Carlton Ritter  Date of Admission: 11/15/2021  Today's Date: 12/02/21  Length of Stay: 17  Primary Care Physician: Jose Luciano MD    Subjective   Chief Complaint: SOA    Intubated/Sedated  Afebrile  O2 Sats 84% on 100% FIO2      Review of Systems   Unable to perform ROS: Intubated          All pertinent negatives and positives are as above. All other systems have been reviewed and are negative unless otherwise stated.     Objective    Temp:  [98.1 °F (36.7 °C)-99.4 °F (37.4 °C)] 98.8 °F (37.1 °C)  Heart Rate:  [] 99  Resp:  [32-34] 32  BP: ()/(53-96) 93/59  Arterial Line BP: ()/(40-76) 105/57  FiO2 (%):  [100 %] 100 %  Physical Exam  Vitals reviewed.   Constitutional:       Appearance: He is well-developed.      Interventions: He is sedated and intubated.   HENT:      Head: Normocephalic and atraumatic.      Right Ear: External ear normal.      Left Ear: External ear normal.      Nose: Nose normal.   Eyes:      General: No scleral icterus.        Right eye: No discharge.         Left eye: No discharge.      Conjunctiva/sclera: Conjunctivae normal.      Pupils: Pupils are equal, round, and reactive to light.   Neck:      Thyroid: No thyromegaly.      Trachea: No tracheal deviation.   Cardiovascular:      Rate and Rhythm: Normal rate and regular rhythm.      Heart sounds: Normal heart sounds. No murmur heard.  No friction rub. No gallop.    Pulmonary:      Effort: Pulmonary effort is normal. No respiratory distress. He is intubated.      Breath sounds: No stridor. Decreased breath sounds and rhonchi present. No wheezing or rales.   Chest:      Chest wall: No tenderness.   Abdominal:      General: Bowel sounds are normal. There is no distension.      Palpations: Abdomen is soft. There is no mass.      Tenderness: There is no abdominal tenderness. There is no guarding or rebound.       Hernia: No hernia is present.   Musculoskeletal:         General: No deformity. Normal range of motion.      Cervical back: Normal range of motion and neck supple.   Lymphadenopathy:      Cervical: No cervical adenopathy.   Skin:     General: Skin is warm and dry.      Coloration: Skin is not pale.      Findings: No erythema or rash.   Neurological:      Mental Status: He is unresponsive.      Cranial Nerves: No cranial nerve deficit.      Motor: No abnormal muscle tone.      Coordination: Coordination normal.      Deep Tendon Reflexes: Reflexes are normal and symmetric. Reflexes normal.           Results Review:  I have reviewed the labs, radiology results, and diagnostic studies.    Laboratory Data:   Results from last 7 days   Lab Units 12/02/21 0319 12/01/21 0349 11/30/21 0337   WBC 10*3/mm3 18.61* 13.77* 17.66*   HEMOGLOBIN g/dL 10.1* 10.0* 10.8*   HEMATOCRIT % 33.3* 33.1* 35.6*   PLATELETS 10*3/mm3 158 168 207        Results from last 7 days   Lab Units 12/02/21 0319 12/01/21 2035 12/01/21 0349 11/30/21 2022 11/30/21 0337   SODIUM mmol/L 141 143 145   < > 146*   POTASSIUM mmol/L 5.8* 5.5* 5.5*   < > 4.9   CHLORIDE mmol/L 104 105 107   < > 106   CO2 mmol/L 31.0* 32.0* 31.0*   < > 32.0*   BUN mg/dL 89* 86* 87*   < > 99*   CREATININE mg/dL 1.22 1.15 1.04   < > 1.17   CALCIUM mg/dL 7.6* 7.6* 7.3*   < > 7.4*   BILIRUBIN mg/dL 0.5  --  0.5  --  0.5   ALK PHOS U/L 63  --  62  --  79   ALT (SGPT) U/L 37  --  28  --  26   AST (SGOT) U/L 40  --  35  --  37   GLUCOSE mg/dL 226* 142* 218*   < > 220*    < > = values in this interval not displayed.       Culture Data:   No results found for: BLOODCX, URINECX, WOUNDCX, MRSACX, RESPCX, STOOLCX    Radiology Data:   Imaging Results (Last 24 Hours)     ** No results found for the last 24 hours. **          I have reviewed the patient's current medications.     Labs reviewed: leukocytosis, hyperkalemia    Telemetry reviewed    Telemetry independently interpreted by me:   NSR      Assessment/Plan     Active Hospital Problems    Diagnosis    • **Pneumonia due to COVID-19 virus    • Acute respiratory failure with hypoxia (HCC)    • History of pulmonary embolism    • Hyperglycemia    • Essential hypertension      1.  Acute Hypoxic respiratory failure  -Decadron  -Pulm consulted and following  -Tocilzumab given    2.  COVID-19 pneumonia  -Decadron  -Pulm consulted and following  -Tocilzumab given  -ID consulted and following    3.  HTN  -monitor    4.  History of PE  -Lovenox    5.  HLD  -Statin    6.  DVT  -Lovenox    7.  Superimposed bacterial pneumonia due to Pseudomonas Aeruginosa and Klebsiella  -IV Abx  -ID following    8.  Hyperkalemia  -Lokelema           Discharge Planning: continue in CCU, poor overall prognosis    Electronically signed by Julito Main MD, 12/02/21, 11:07 CST.

## 2021-12-02 NOTE — PLAN OF CARE
Problem: Palliative Care  Goal: Enhanced Quality of Life  Outcome: Ongoing, Not Progressing    Patient remains intubated and sedated. Attempted to contact spouse again today and left voicemail. Would like to arrange a teleconference or in-person follow-up for beginning of next week per suggestion of palliative physician.     MIGUEL ANGEL Cano  12/2/2021

## 2021-12-03 NOTE — PROGRESS NOTES
Palliative Medicine Note    CC: intubated and sedated    History:  Carlton Ritter is a 71 y.o. male   Sats in the low 80s today after maintaining in the high 80s yesterday and the low 90s the days prior.     ROS:  Review of Systems   Unable to perform ROS: Intubated     Current Facility-Administered Medications:   •  acetaminophen (TYLENOL) tablet 650 mg, 650 mg, Oral, Q4H PRN, 650 mg at 11/24/21 0806 **OR** acetaminophen (TYLENOL) suppository 650 mg, 650 mg, Rectal, Q4H PRN, Nikolas Gray MD  •  acetaminophen (TYLENOL) tablet 650 mg, 650 mg, Oral, Q4H PRN **OR** acetaminophen (TYLENOL) suppository 650 mg, 650 mg, Rectal, Q4H PRN, Nikolas Gray MD  •  albuterol sulfate HFA (PROVENTIL HFA;VENTOLIN HFA;PROAIR HFA) inhaler 2 puff, 2 puff, Inhalation, Q4H - RT, Nikolas Gray MD, 2 puff at 12/03/21 1537  •  artificial tears ophthalmic ointment, , Both Eyes, Q4H, Charles Hamilton MD, Given at 12/03/21 1509  •  ascorbic acid (VITAMIN C) tablet 500 mg, 500 mg, Oral, Daily, Nikolas Gray MD, 500 mg at 12/03/21 0803  •  atorvastatin (LIPITOR) tablet 10 mg, 10 mg, Oral, Nightly, Nikolas Gray MD, 10 mg at 12/02/21 2102  •  Beneprotein packet 3 packet, 3 packet, Per G Tube, TID, Julito Main MD, 3 packet at 12/03/21 1615  •  benzonatate (TESSALON) capsule 200 mg, 200 mg, Oral, Q4H PRN, Nikolas Gray MD, 200 mg at 11/24/21 0807  •  budesonide-formoterol (SYMBICORT) 160-4.5 MCG/ACT inhaler 2 puff, 2 puff, Inhalation, BID - RT, Aliya Fam APRN, 2 puff at 12/03/21 0729  •  cholecalciferol (VITAMIN D3) tablet 1,000 Units, 1,000 Units, Oral, Daily, Nikolas Gray MD, 1,000 Units at 12/03/21 0803  •  dexamethasone (DECADRON) tablet 6 mg, 6 mg, Oral, BID, 6 mg at 12/03/21 0802 **OR** dexamethasone (DECADRON) injection 6 mg, 6 mg, Intravenous, BID, Markus Hamilton MD, 6 mg at 12/02/21 2101  •  dextromethorphan polistirex ER (DELSYM) 30 MG/5ML oral suspension 60 mg, 60 mg, Oral, Q12H PRN,  Nikolas Gray MD, 60 mg at 11/23/21 2011  •  dextrose (D50W) (25 g/50 mL) IV injection 25 g, 25 g, Intravenous, Q15 Min PRN, Nikolas Gray MD  •  dextrose (D50W) (25 g/50 mL) IV injection 50 mL, 50 mL, Intravenous, Q1H PRN, Aravind Landon MD, 50 mL at 11/26/21 1235  •  dextrose (GLUTOSE) oral gel 15 g, 15 g, Oral, Q15 Min PRN, Nikolas Gray MD  •  enoxaparin (LOVENOX) syringe 80 mg, 1 mg/kg, Subcutaneous, Q12H, Julito Main MD  •  famotidine (PEPCID) tablet 20 mg, 20 mg, Oral, Daily, Aravind Landon MD, 20 mg at 12/03/21 0803  •  fenofibrate (TRICOR) tablet 48 mg, 48 mg, Oral, Daily, Aravind Landon MD, 48 mg at 12/03/21 0803  •  fentaNYL 2500 mcg/250 mL NS infusion,  mcg/hr, Intravenous, Titrated, Julito Main MD, Last Rate: 15 mL/hr at 12/03/21 1030, 150 mcg/hr at 12/03/21 1030  •  glucagon (human recombinant) (GLUCAGEN DIAGNOSTIC) injection 1 mg, 1 mg, Subcutaneous, Q15 Min PRN, Nikolas Gray MD  •  guaiFENesin (ROBITUSSIN) 100 MG/5ML oral solution 200 mg, 200 mg, Oral, TID, Aliya Fam, APRN, 200 mg at 12/03/21 1615  •  insulin lispro (humaLOG) injection 0-9 Units, 0-9 Units, Subcutaneous, Q6H, Aravind Landon MD, 2 Units at 12/03/21 1259  •  melatonin tablet 3 mg, 3 mg, Oral, Nightly, Nikolas Gray MD, 3 mg at 12/02/21 2103  •  multivitamin with minerals 1 tablet, 1 tablet, Oral, Daily, Nikolas Gray MD, 1 tablet at 12/03/21 0803  •  norepinephrine (LEVOPHED) 8 mg in 250 mL NS infusion (premix), 0.02-0.3 mcg/kg/min, Intravenous, Titrated, Aravind Landon MD, Stopped at 12/03/21 1015  •  ondansetron (ZOFRAN) tablet 4 mg, 4 mg, Oral, Q6H PRN **OR** ondansetron (ZOFRAN) injection 4 mg, 4 mg, Intravenous, Q6H PRN, Nikolas Gray MD  •  polyethylene glycol (MIRALAX) packet 17 g, 17 g, Oral, Daily, Aravind Landon MD, 17 g at 12/01/21 0806  •  propofol (DIPRIVAN) infusion 10 mg/mL 100 mL, 5-75 mcg/kg/min, Intravenous, Titrated, Aravind Landon MD, Last  "Rate: 37.7 mL/hr at 12/03/21 1508, 70 mcg/kg/min at 12/03/21 1508  •  rocuronium (ZEMURON) 500 mg in sodium chloride 0.9 % 100 mL infusion, 8-12 mcg/kg/min, Intravenous, Titrated, Charles Hamilton MD, Held at 11/29/21 0205  •  rocuronium (ZEMURON) injection 22.4 mg, 250 mcg/kg, Intravenous, Once PRN, Charles Hamilton MD  •  rocuronium (ZEMURON) injection 50 mg, 50 mg, Intravenous, Once, Aravind Landon MD  •  sodium chloride 0.45 % infusion, 75 mL/hr, Intravenous, Continuous, Mac Ng MD, Last Rate: 75 mL/hr at 12/03/21 0320, 75 mL/hr at 12/03/21 0320  •  sodium chloride nasal spray 2 spray, 2 spray, Each Nare, PRN, Kelsey Zuñiga MD  •  sodium zirconium cyclosilicate (LOKELMA) pack 10 g, 10 g, Nasogastric, Daily, Mac Ng MD, 10 g at 12/03/21 0803  •  thiamine (VITAMIN B-1) tablet 100 mg, 100 mg, Oral, Daily, Julito Main MD, 100 mg at 12/03/21 0803  •  zinc sulfate (ZINCATE) capsule 220 mg, 220 mg, Oral, Daily, Nikolas Gray MD, 220 mg at 12/03/21 0804      OBJECTIVE:  BP (!) 89/64   Pulse 91   Temp 97.2 °F (36.2 °C) (Axillary)   Resp (!) 34   Ht 172.7 cm (68\")   Wt 84.8 kg (187 lb)   SpO2 (!) 88%   BMI 28.43 kg/m²    Physical Exam  Constitutional:       Appearance: He is ill-appearing and toxic-appearing.   HENT:      Head: Normocephalic and atraumatic.   Pulmonary:      Effort: No respiratory distress.   Abdominal:      General: There is no distension.     Exam performed from outside the glass-doored room due to COVID positive to prevent cross-contamination, unnecessary exposure and/or unneeded PPE use.     C-reactive Protein (12/03/2021 05:22)   Comprehensive Metabolic Panel (12/03/2021 05:22)   CBC & Differential (12/03/2021 05:22)   Procalcitonin (12/03/2021 05:22)     Assessment:  - Pneumonia due to COVID 19 virus  - Acute respiratory failure with hypoxia- Day 9 MV  - History of PE-on anticoagulation  - DVT on Lovenox  - Hyperglycemia  - NGUYỄN with ATN  - Hyperkalemia  - Gross " "hematuria     PPS  Unable to assess due to intubation     Symptoms     1. Weakness  2. Debility     Plan    1.  I was able to reach Mrs. Ritter via phone today and notified her that his saturations have been lower despite ongoing maximal support on the ventilator.  Kidney function remains stable without hematuria.  She notes she remains hopeful that he will improve and be able to get off the ventilator, but does understand our worry with the changes that have occurred in the past couple of days with his saturations.    2.  We did discuss his 9 days on the ventilator and the reality that he cannot stay with an ET tube indefinitely.  Monday will be day 12 and we will need to start thinking toward the possibility of tracheostomy next week.  As of now, she states she would want him to have a tracheostomy.  I did rephrase her statement and confirm with her that this is also what HE would want us to do in this scenario.     3.  I also addressed his CODE STATUS in light of the changes over the past couple of days.  She states \"we have already talked about that\" and confirms she would wish him to remain a full code with CPR and full support.    Patient was discussed in Palliative Interdisciplinary Team Meeting.    Hernesto Moore D.O. 12/3/2021   Palliative Medicine      "

## 2021-12-03 NOTE — PROGRESS NOTES
Nephrology ([facility]) Progress Note      Patient:  Carlton Ritter  YOB: 1950  Date of Service: 12/3/2021  MRN: [id number]   Acct: [id number]   Primary Care Physician: Jose Luciano MD  Advance Directive:   Code Status and Medical Interventions:   Ordered at: 11/16/21 0054     Level Of Support Discussed With:    Patient     Code Status (Patient has no pulse and is not breathing):    CPR (Attempt to Resuscitate)     Medical Interventions (Patient has pulse or is breathing):    Full Support     Admit Date: 11/15/2021       Hospital Day: 18  Referring Provider: No Known Provider      Patient personally seen and examined.  Complete chart including Consults, Notes, Operative Reports, Labs, Cardiology, and Radiology studies reviewed as able.        Subjective:  Carlton Ritter is a 71 y.o. male for whom we were consulted for evaluation and treatment of acute kidney injury and hyperkalemia.  Admitted on 11/15 with COVID-19 pneumonia. Required intubation on 11/24 due to worsening hypoxia.  Levophed also started on 11/24. Starting on 11/24 creatinine and potassium levels started climbing. Patient started on TID Lokelma on 11/25.  Remaining persistently hyperkalemic, but creatinine and urine output have improved    Today remains intubated and sedated. Still on Levophed drip. Urine output nonoliguric.  Limited physical exam due to COVID isolation. Discussed with nursing staff    Allergies:  Patient has no known allergies.    Home Meds:  Medications Prior to Admission   Medication Sig Dispense Refill Last Dose   • apixaban (ELIQUIS) 5 MG tablet tablet Take 1 tablet by mouth Every 12 (Twelve) Hours. 60 tablet 2 Past Week at Unknown time   • ezetimibe (ZETIA) 10 MG tablet Take 10 mg by mouth Daily.   Past Week at Unknown time   • olmesartan (BENICAR) 20 MG tablet Take 40 mg by mouth Daily.   Past Week at Unknown time       Medicines:  Current Facility-Administered Medications    Medication Dose Route Frequency Provider Last Rate Last Admin   • acetaminophen (TYLENOL) tablet 650 mg  650 mg Oral Q4H PRN Nikolas Gray MD   650 mg at 11/24/21 0806    Or   • acetaminophen (TYLENOL) suppository 650 mg  650 mg Rectal Q4H PRN Nikolas Gray MD       • acetaminophen (TYLENOL) tablet 650 mg  650 mg Oral Q4H PRN Nikolas Gray MD        Or   • acetaminophen (TYLENOL) suppository 650 mg  650 mg Rectal Q4H PRN Nikolas Gray MD       • albuterol sulfate HFA (PROVENTIL HFA;VENTOLIN HFA;PROAIR HFA) inhaler 2 puff  2 puff Inhalation Q4H - RT Nikolas Gray MD   2 puff at 12/03/21 0729   • artificial tears ophthalmic ointment   Both Eyes Q4H Charles Hamilton MD   Given at 12/03/21 0759   • ascorbic acid (VITAMIN C) tablet 500 mg  500 mg Oral Daily Nikolas Gray MD   500 mg at 12/03/21 0803   • atorvastatin (LIPITOR) tablet 10 mg  10 mg Oral Nightly Nikolas Gray MD   10 mg at 12/02/21 2102   • Beneprotein packet 3 packet  3 packet Per G Tube TID Julito Main MD   3 packet at 12/03/21 0803   • benzonatate (TESSALON) capsule 200 mg  200 mg Oral Q4H PRN Nikolas Gray MD   200 mg at 11/24/21 0807   • budesonide-formoterol (SYMBICORT) 160-4.5 MCG/ACT inhaler 2 puff  2 puff Inhalation BID - RT Aliya Fam APRN   2 puff at 12/03/21 0729   • cholecalciferol (VITAMIN D3) tablet 1,000 Units  1,000 Units Oral Daily Nikolas Gray MD   1,000 Units at 12/03/21 0803   • dexamethasone (DECADRON) tablet 6 mg  6 mg Oral BID Markus Hamilton MD   6 mg at 12/03/21 0802    Or   • dexamethasone (DECADRON) injection 6 mg  6 mg Intravenous BID Markus Hamilton MD   6 mg at 12/02/21 2101   • dextromethorphan polistirex ER (DELSYM) 30 MG/5ML oral suspension 60 mg  60 mg Oral Q12H PRN Nikolas Gray MD   60 mg at 11/23/21 2011   • dextrose (D50W) (25 g/50 mL) IV injection 25 g  25 g Intravenous Q15 Min PRN Nikolas Gray MD       • dextrose (D50W) (25 g/50 mL) IV injection 50 mL   50 mL Intravenous Q1H PRN Aravind Landon MD   50 mL at 11/26/21 1235   • dextrose (GLUTOSE) oral gel 15 g  15 g Oral Q15 Min PRN Nikolas Gray MD       • enoxaparin (LOVENOX) syringe 90 mg  1 mg/kg Subcutaneous Q12H Julito Main MD   90 mg at 12/03/21 0802   • famotidine (PEPCID) tablet 20 mg  20 mg Oral Daily Aravind Landon MD   20 mg at 12/03/21 0803   • fenofibrate (TRICOR) tablet 48 mg  48 mg Oral Daily Aravind Landon MD   48 mg at 12/03/21 0803   • fentaNYL 2500 mcg/250 mL NS infusion   mcg/hr Intravenous Titrated Julito Main MD 5 mL/hr at 12/03/21 0515 50 mcg/hr at 12/03/21 0515   • glucagon (human recombinant) (GLUCAGEN DIAGNOSTIC) injection 1 mg  1 mg Subcutaneous Q15 Min PRN Nikolas Gray MD       • guaiFENesin (ROBITUSSIN) 100 MG/5ML oral solution 200 mg  200 mg Oral TID Aliya Fam APRN   200 mg at 12/03/21 0801   • insulin lispro (humaLOG) injection 0-9 Units  0-9 Units Subcutaneous Q6H Aravind Landon MD   2 Units at 12/03/21 0525   • melatonin tablet 3 mg  3 mg Oral Nightly Nikolas Gray MD   3 mg at 12/02/21 2103   • multivitamin with minerals 1 tablet  1 tablet Oral Daily Nikolas Gray MD   1 tablet at 12/03/21 0803   • norepinephrine (LEVOPHED) 8 mg in 250 mL NS infusion (premix)  0.02-0.3 mcg/kg/min Intravenous Titrated Aravind Landon MD 6.73 mL/hr at 12/03/21 0509 0.04 mcg/kg/min at 12/03/21 0509   • ondansetron (ZOFRAN) tablet 4 mg  4 mg Oral Q6H PRN Nikolas Gray MD        Or   • ondansetron (ZOFRAN) injection 4 mg  4 mg Intravenous Q6H PRN Nikolas Gray MD       • piperacillin-tazobactam (ZOSYN) 4.5 g in iso-osmotic dextrose 100 mL IVPB (premix)  4.5 g Intravenous Q6H Kelsey Zuñiga MD   4.5 g at 12/03/21 0509   • polyethylene glycol (MIRALAX) packet 17 g  17 g Oral Daily Aravind Landon MD   17 g at 12/03/21 0802   • propofol (DIPRIVAN) infusion 10 mg/mL 100 mL  5-75 mcg/kg/min Intravenous Titrated Aravind Landon MD  32.3 mL/hr at 12/03/21 0822 60 mcg/kg/min at 12/03/21 0822   • rocuronium (ZEMURON) 500 mg in sodium chloride 0.9 % 100 mL infusion  8-12 mcg/kg/min Intravenous Titrated Charles Hamilton MD   Held at 11/29/21 0205   • rocuronium (ZEMURON) injection 22.4 mg  250 mcg/kg Intravenous Once PRN Charles Hamilton MD       • rocuronium (ZEMURON) injection 50 mg  50 mg Intravenous Once Aravind Landon MD       • sodium chloride 0.45 % infusion  75 mL/hr Intravenous Continuous Mac Ng MD 75 mL/hr at 12/03/21 0320 75 mL/hr at 12/03/21 0320   • sodium chloride nasal spray 2 spray  2 spray Each Nare PRN Kelsey Zuñiga MD       • sodium zirconium cyclosilicate (LOKELMA) pack 10 g  10 g Nasogastric Daily Mac Ng MD   10 g at 12/03/21 0803   • thiamine (VITAMIN B-1) tablet 100 mg  100 mg Oral Daily Julito Main MD   100 mg at 12/03/21 0803   • zinc sulfate (ZINCATE) capsule 220 mg  220 mg Oral Daily Nikolas Gray MD   220 mg at 12/03/21 0804       Past Medical History:  Past Medical History:   Diagnosis Date   • Acute saddle pulmonary embolism with acute cor pulmonale (HCC)    • Hyperlipidemia    • Hypertension        Past Surgical History:  Past Surgical History:   Procedure Laterality Date   • CARDIAC CATHETERIZATION N/A 10/21/2016    Procedure: Thrombolytic Therapy;  Surgeon: Andrew Akins MD;  Location:  [facility];  Service:    • EKOS CATHETER REMOVAL Right 10/22/2016    Procedure: Ekos catheter removal with stent placement;  Surgeon: Percy Chavarria MD;  Location:  [facility];  Service:        Family History  Family History   Problem Relation Age of Onset   • Heart disease Mother    • Hypertension Mother    • Heart disease Father    • Hypertension Brother        Social History  Social History     Socioeconomic History   • Marital status:    Tobacco Use   • Smoking status: Never Smoker   • Tobacco comment: Wife is surrogate decision-maker   Substance and  Sexual Activity   • Alcohol use: No   • Drug use: No   • Sexual activity: Defer         Review of Systems:  Unable to obtain due to intubated and sedated    Objective:  Patient Vitals for the past 24 hrs:   BP Temp Temp src Pulse Resp SpO2 Weight   12/03/21 0729 -- -- -- 85 (!) 34 91 % --   12/03/21 0700 110/65 -- -- 84 -- 91 % --   12/03/21 0630 -- -- -- 85 -- 90 % --   12/03/21 0615 -- -- -- 84 -- (!) 89 % --   12/03/21 0600 113/69 -- -- 84 -- 90 % --   12/03/21 0545 -- -- -- 83 -- 90 % --   12/03/21 0530 -- -- -- 89 -- (!) 80 % --   12/03/21 0515 -- -- -- 86 -- 91 % --   12/03/21 0500 96/55 -- -- 88 -- 90 % --   12/03/21 0445 -- -- -- 88 -- 90 % --   12/03/21 0430 -- -- -- 89 -- (!) 89 % --   12/03/21 0415 -- -- -- 89 -- 90 % --   12/03/21 0400 90/55 -- -- 90 -- (!) 89 % --   12/03/21 0345 -- -- -- 91 -- (!) 89 % --   12/03/21 0330 -- -- -- 89 -- 90 % --   12/03/21 0315 -- -- -- 89 -- 90 % --   12/03/21 0300 136/77 98.9 °F (37.2 °C) Axillary 93 -- (!) 87 % 84.8 kg (187 lb)   12/03/21 0257 -- -- -- 98 (!) 32 (!) 87 % --   12/03/21 0252 -- -- -- -- (!) 32 -- --   12/03/21 0245 -- -- -- 94 -- (!) 87 % --   12/03/21 0230 -- -- -- 90 -- (!) 89 % --   12/03/21 0215 -- -- -- 89 -- 90 % --   12/03/21 0200 115/73 -- -- 89 -- 90 % --   12/03/21 0145 -- -- -- 91 -- (!) 88 % --   12/03/21 0130 -- -- -- 91 -- (!) 89 % --   12/03/21 0115 -- -- -- 91 -- (!) 89 % --   12/03/21 0100 102/68 -- -- 90 -- (!) 89 % --   12/03/21 0045 -- -- -- 89 -- 90 % --   12/03/21 0030 -- -- -- 88 -- 91 % --   12/03/21 0015 -- -- -- 90 -- 90 % --   12/03/21 0000 108/70 -- -- 92 -- 91 % --   12/02/21 2345 -- -- -- 92 -- (!) 88 % --   12/02/21 2330 -- 98.8 °F (37.1 °C) Axillary 95 -- (!) 89 % --   12/02/21 2315 -- -- -- 92 -- 90 % --   12/02/21 2300 118/72 -- -- 95 -- 90 % --   12/02/21 2251 -- -- -- 93 (!) 32 90 % --   12/02/21 2247 -- -- -- -- (!) 32 -- --   12/02/21 2245 -- -- -- 95 -- 90 % --   12/02/21 2230 -- -- -- 93 -- 91 % --   12/02/21 2215  -- -- -- 93 -- 92 % --   12/02/21 2200 107/62 -- -- 94 -- 92 % --   12/02/21 2145 -- -- -- 94 -- 92 % --   12/02/21 2130 -- -- -- 96 -- 92 % --   12/02/21 2115 -- -- -- 96 -- 93 % --   12/02/21 2100 117/68 99.1 °F (37.3 °C) Axillary 98 -- 90 % --   12/02/21 2045 -- -- -- 98 -- 92 % --   12/02/21 2030 -- -- -- 97 -- 92 % --   12/02/21 2015 -- -- -- 98 -- 92 % --   12/02/21 2000 105/65 -- -- 98 -- 92 % --   12/02/21 1945 -- -- -- 99 -- 91 % --   12/02/21 1930 -- -- -- 100 -- 91 % --   12/02/21 1915 -- -- -- 98 -- 91 % --   12/02/21 1900 107/65 -- -- 98 -- 92 % --   12/02/21 1845 -- -- -- 96 -- 90 % --   12/02/21 1832 -- -- -- 101 (!) 32 90 % --   12/02/21 1830 -- -- -- 98 -- 91 % --   12/02/21 1827 -- -- -- -- (!) 32 -- --   12/02/21 1815 -- -- -- 98 -- 91 % --   12/02/21 1800 111/66 -- -- 97 -- 91 % --   12/02/21 1745 -- -- -- 101 -- 90 % --   12/02/21 1730 -- -- -- 102 -- (!) 89 % --   12/02/21 1715 -- -- -- 100 -- 91 % --   12/02/21 1700 104/66 -- -- 94 -- 91 % --   12/02/21 1645 -- 98 °F (36.7 °C) Axillary 97 -- 91 % --   12/02/21 1630 -- -- -- 97 -- 91 % --   12/02/21 1615 -- -- -- 97 -- 90 % --   12/02/21 1600 106/64 -- -- 96 -- 90 % --   12/02/21 1545 -- -- -- 99 -- 90 % --   12/02/21 1530 -- -- -- 96 -- (!) 89 % --   12/02/21 1525 -- -- -- 96 -- 90 % --   12/02/21 1515 -- -- -- 99 -- (!) 89 % --   12/02/21 1500 105/69 -- -- 95 -- (!) 89 % --   12/02/21 1445 -- -- -- 94 -- (!) 89 % --   12/02/21 1430 -- -- -- 94 -- (!) 89 % --   12/02/21 1422 -- -- -- 95 (!) 32 (!) 89 % --   12/02/21 1415 -- -- -- 95 -- (!) 89 % --   12/02/21 1400 100/61 -- -- 97 -- (!) 88 % --   12/02/21 1345 -- -- -- 94 -- (!) 89 % --   12/02/21 1330 -- -- -- 94 -- (!) 89 % --   12/02/21 1315 -- -- -- 94 -- (!) 89 % --   12/02/21 1300 108/61 -- -- 95 -- (!) 88 % --   12/02/21 1245 -- -- -- 97 -- (!) 87 % --   12/02/21 1235 -- -- -- 99 -- (!) 85 % --   12/02/21 1230 -- -- -- 99 -- (!) 81 % --   12/02/21 1222 -- -- -- 98 -- (!) 80 % --    12/02/21 1215 -- -- -- 100 -- (!) 85 % --   12/02/21 1200 105/69 99.1 °F (37.3 °C) Axillary 100 -- (!) 87 % --   12/02/21 1145 -- -- -- 95 -- (!) 87 % --   12/02/21 1130 -- -- -- 95 -- (!) 87 % --   12/02/21 1115 -- -- -- 94 -- (!) 87 % --   12/02/21 1100 93/59 -- -- 99 -- (!) 86 % --   12/02/21 1045 -- -- -- 96 -- (!) 87 % --   12/02/21 1034 -- -- -- 96 (!) 32 (!) 88 % --   12/02/21 1030 -- -- -- 96 -- (!) 88 % --   12/02/21 1015 -- -- -- 96 -- (!) 89 % --   12/02/21 1000 95/56 -- -- 96 -- (!) 88 % --   12/02/21 0945 -- -- -- 101 -- (!) 81 % --   12/02/21 0930 -- -- -- 99 -- (!) 84 % --   12/02/21 0915 -- -- -- 102 -- (!) 84 % --   12/02/21 0900 96/63 -- -- 101 -- (!) 84 % --   12/02/21 0845 -- -- -- 101 -- (!) 85 % --       Intake/Output Summary (Last 24 hours) at 12/3/2021 0830  Last data filed at 12/3/2021 0320  Gross per 24 hour   Intake 5559.94 ml   Output 1750 ml   Net 3809.94 ml     General: intubated, sedated  Chest:  equal chest rise  CVS: regular rate and rhythm  Abdominal: soft, nontender, positive bowel sounds  Extremities: no cyanosis or edema  Skin: warm and dry without rash      Labs:  Results from last 7 days   Lab Units 12/03/21  0522 12/02/21  0319 12/01/21  0349   WBC 10*3/mm3 16.07* 18.61* 13.77*   HEMOGLOBIN g/dL 9.5* 10.1* 10.0*   HEMATOCRIT % 31.0* 33.3* 33.1*   PLATELETS 10*3/mm3 130* 158 168         Results from last 7 days   Lab Units 12/03/21  0522 12/03/21  0000 12/02/21 0319 12/01/21 2035 12/01/21 0349   SODIUM mmol/L 138  136 138 141   < > 145   POTASSIUM mmol/L 5.7*  5.6* 6.0* 5.8*   < > 5.5*   CHLORIDE mmol/L 101  100 101 104   < > 107   CO2 mmol/L 28.0  29.0 30.0* 31.0*   < > 31.0*   BUN mg/dL 93*  97* 90* 89*   < > 87*   CREATININE mg/dL 1.26  1.21 1.21 1.22   < > 1.04   CALCIUM mg/dL 7.7*  7.8* 7.7* 7.6*   < > 7.3*   BILIRUBIN mg/dL 0.5  --  0.5  --  0.5   ALK PHOS U/L 54  --  63  --  62   ALT (SGPT) U/L 50*  --  37  --  28   AST (SGOT) U/L 34  --  40  --  35   GLUCOSE  mg/dL 190*  189* 194* 226*   < > 218*    < > = values in this interval not displayed.       Radiology:   Imaging Results (Last 72 Hours)     ** No results found for the last 72 hours. **          Culture:  Respiratory Culture   Date Value Ref Range Status   11/25/2021 Light growth (2+) Pseudomonas aeruginosa (A)  Final   11/25/2021 (A)  Final    Light growth (2+) Klebsiella pneumoniae ssp pneumoniae   11/25/2021 No Normal Respiratory Michelle (A)  Final         Assessment   1.  Acute kidney injury, ATN--resolved  2.  Hyperkalemia  3.  COVID-19 pneumonia  4.  Acute respiratory failure--intubated  5.  Metabolic alkalosis  6.  Hypernatremia--improved  7.  Hypermagnesemia     Plan:  1.  Continue low-rate IV fluids  2.  Potassium remains elevated but stable, continue Lokelma.  3.  Monitor labs      Mitesh Huff, APRN  12/3/2021  08:30 CST

## 2021-12-03 NOTE — PROGRESS NOTES
INFECTIOUS DISEASES PROGRESS NOTE    Patient:  Carlton Ritter  YOB: 1950  MRN: [id number]   Admit date: 11/15/2021   Admitting Physician: Julito Main MD  Primary Care Physician: Jose Luciano MD      Chief Complaint: Unobtainable from patient on ventilator      Interval History: Patient remains on maximal ventilator settings.  He recently was cleaned up and turned in O2 sats are around 83%.  THERESA Hatfield went back into assess patient, potentially reposition and O2 sats started to increase some.    Allergies: No Known Allergies    Current Meds:     Current Facility-Administered Medications:   •  acetaminophen (TYLENOL) tablet 650 mg, 650 mg, Oral, Q4H PRN, 650 mg at 11/24/21 0806 **OR** acetaminophen (TYLENOL) suppository 650 mg, 650 mg, Rectal, Q4H PRN, Nikolas Gray MD  •  acetaminophen (TYLENOL) tablet 650 mg, 650 mg, Oral, Q4H PRN **OR** acetaminophen (TYLENOL) suppository 650 mg, 650 mg, Rectal, Q4H PRN, Nikolas Gray MD  •  albuterol sulfate HFA (PROVENTIL HFA;VENTOLIN HFA;PROAIR HFA) inhaler 2 puff, 2 puff, Inhalation, Q4H - RT, Nkiolas Gray MD, 2 puff at 12/03/21 0729  •  artificial tears ophthalmic ointment, , Both Eyes, Q4H, Charles Hamilton MD, Given at 12/03/21 0759  •  ascorbic acid (VITAMIN C) tablet 500 mg, 500 mg, Oral, Daily, Nikolas Gray MD, 500 mg at 12/03/21 0803  •  atorvastatin (LIPITOR) tablet 10 mg, 10 mg, Oral, Nightly, Nikolas Gray MD, 10 mg at 12/02/21 2102  •  Beneprotein packet 3 packet, 3 packet, Per G Tube, TID, Julito Main MD, 3 packet at 12/03/21 0803  •  benzonatate (TESSALON) capsule 200 mg, 200 mg, Oral, Q4H PRN, Nikolas Gray MD, 200 mg at 11/24/21 0807  •  budesonide-formoterol (SYMBICORT) 160-4.5 MCG/ACT inhaler 2 puff, 2 puff, Inhalation, BID - RT, Aliya Fam, KLAUS, 2 puff at 12/03/21 0770  •  cholecalciferol (VITAMIN D3) tablet 1,000 Units, 1,000 Units, Oral, Daily, Nikolas Gray MD, 1,000 Units  at 12/03/21 0803  •  dexamethasone (DECADRON) tablet 6 mg, 6 mg, Oral, BID, 6 mg at 12/03/21 0802 **OR** dexamethasone (DECADRON) injection 6 mg, 6 mg, Intravenous, BID, Markus Hamilton MD, 6 mg at 12/02/21 2101  •  dextromethorphan polistirex ER (DELSYM) 30 MG/5ML oral suspension 60 mg, 60 mg, Oral, Q12H PRN, Nikolas Gray MD, 60 mg at 11/23/21 2011  •  dextrose (D50W) (25 g/50 mL) IV injection 25 g, 25 g, Intravenous, Q15 Min PRN, Nikolas Gray MD  •  dextrose (D50W) (25 g/50 mL) IV injection 50 mL, 50 mL, Intravenous, Q1H PRN, Aravind Landon MD, 50 mL at 11/26/21 1235  •  dextrose (GLUTOSE) oral gel 15 g, 15 g, Oral, Q15 Min PRN, Nikolas Gray MD  •  enoxaparin (LOVENOX) syringe 90 mg, 1 mg/kg, Subcutaneous, Q12H, Julito Main MD, 90 mg at 12/03/21 0802  •  famotidine (PEPCID) tablet 20 mg, 20 mg, Oral, Daily, Aravind Landon MD, 20 mg at 12/03/21 0803  •  fenofibrate (TRICOR) tablet 48 mg, 48 mg, Oral, Daily, Aravind Landon MD, 48 mg at 12/03/21 0803  •  fentaNYL 2500 mcg/250 mL NS infusion,  mcg/hr, Intravenous, Titrated, Julito Main MD, Last Rate: 5 mL/hr at 12/03/21 0515, 50 mcg/hr at 12/03/21 0515  •  glucagon (human recombinant) (GLUCAGEN DIAGNOSTIC) injection 1 mg, 1 mg, Subcutaneous, Q15 Min PRN, Nikolas Gray MD  •  guaiFENesin (ROBITUSSIN) 100 MG/5ML oral solution 200 mg, 200 mg, Oral, TID, Aliya Fam APRN, 200 mg at 12/03/21 0801  •  insulin lispro (humaLOG) injection 0-9 Units, 0-9 Units, Subcutaneous, Q6H, Aravind Landon MD, 2 Units at 12/03/21 0525  •  melatonin tablet 3 mg, 3 mg, Oral, Nightly, Nikolas Gray MD, 3 mg at 12/02/21 2103  •  multivitamin with minerals 1 tablet, 1 tablet, Oral, Daily, Nikolas Gray MD, 1 tablet at 12/03/21 0803  •  norepinephrine (LEVOPHED) 8 mg in 250 mL NS infusion (premix), 0.02-0.3 mcg/kg/min, Intravenous, Titrated, Aravind Landon MD, Last Rate: 6.73 mL/hr at 12/03/21 0509, 0.04 mcg/kg/min at 12/03/21  "0509  •  ondansetron (ZOFRAN) tablet 4 mg, 4 mg, Oral, Q6H PRN **OR** ondansetron (ZOFRAN) injection 4 mg, 4 mg, Intravenous, Q6H PRN, Nikolas Gray MD  •  piperacillin-tazobactam (ZOSYN) 4.5 g in iso-osmotic dextrose 100 mL IVPB (premix), 4.5 g, Intravenous, Q6H, Kelsey Zuñiga MD, 4.5 g at 21 0509  •  polyethylene glycol (MIRALAX) packet 17 g, 17 g, Oral, Daily, Aravind Landon MD, 17 g at 21 0802  •  propofol (DIPRIVAN) infusion 10 mg/mL 100 mL, 5-75 mcg/kg/min, Intravenous, Titrated, Aravind Landon MD, Last Rate: 32.3 mL/hr at 21 0822, 60 mcg/kg/min at 21 0822  •  rocuronium (ZEMURON) 500 mg in sodium chloride 0.9 % 100 mL infusion, 8-12 mcg/kg/min, Intravenous, Titrated, Charles Hamilton MD, Held at 21 0205  •  rocuronium (ZEMURON) injection 22.4 mg, 250 mcg/kg, Intravenous, Once PRN, Charles Hamilton MD  •  rocuronium (ZEMURON) injection 50 mg, 50 mg, Intravenous, Once, Aravind Landon MD  •  sodium chloride 0.45 % infusion, 75 mL/hr, Intravenous, Continuous, Mac Ng MD, Last Rate: 75 mL/hr at 21 0320, 75 mL/hr at 21 0320  •  sodium chloride nasal spray 2 spray, 2 spray, Each Nare, PRN, Kelsey Zuñiga MD  •  sodium zirconium cyclosilicate (LOKELMA) pack 10 g, 10 g, Nasogastric, Daily, Mac Ng MD, 10 g at 21 0803  •  thiamine (VITAMIN B-1) tablet 100 mg, 100 mg, Oral, Daily, Julito Main MD, 100 mg at 21 0803  •  zinc sulfate (ZINCATE) capsule 220 mg, 220 mg, Oral, Daily, Nikolas Gray MD, 220 mg at 21 0804      Review of Systems   Unable to perform ROS: Intubated       Objective     Vital Signs:  Temp (24hrs), Av.8 °F (37.1 °C), Min:98 °F (36.7 °C), Max:99.1 °F (37.3 °C)      /65   Pulse 85   Temp 98.9 °F (37.2 °C) (Axillary)   Resp (!) 34   Ht 172.7 cm (68\")   Wt 84.8 kg (187 lb)   SpO2 91%   BMI 28.43 kg/m²       Physical Exam  General: Patient was lying in bed in no acute distress.  "   HEENT: ET tube in place  Respiratory: Appears synchronous with the vent however some accessory muscle usage.  Neuro: Sedated on vent   Psych: Sedated on vent      Results Review:    I reviewed the patient's new clinical results.    Lab Results:  CBC:   Lab Results   Lab 11/27/21 0236 11/28/21 0616 11/29/21  0554 11/30/21 0337 12/01/21 0349 12/02/21 0319 12/03/21 0522   WBC 46.77* 21.30* 17.44* 17.66* 13.77* 18.61* 16.07*   HEMOGLOBIN 14.8 12.1* 11.3* 10.8* 10.0* 10.1* 9.5*   HEMATOCRIT 46.5 38.3 36.4* 35.6* 33.1* 33.3* 31.0*   PLATELETS 275 181 183 207 168 158 130*   LYMPHOCYTE % 1.1*  --   --   --   --   --   --    MONOCYTES % 2.1*  --   --   --   --   --   --          CMP:   Lab Results   Lab 12/01/21 0349 12/01/21 2035 12/02/21 0319 12/03/21  0000 12/03/21 0522   SODIUM 145   < > 141 138 138  136   POTASSIUM 5.5*   < > 5.8* 6.0* 5.7*  5.6*   CHLORIDE 107   < > 104 101 101  100   CO2 31.0*   < > 31.0* 30.0* 28.0  29.0   BUN 87*   < > 89* 90* 93*  97*   CREATININE 1.04   < > 1.22 1.21 1.26  1.21   CALCIUM 7.3*   < > 7.6* 7.7* 7.7*  7.8*   BILIRUBIN 0.5  --  0.5  --  0.5   ALK PHOS 62  --  63  --  54   ALT (SGPT) 28  --  37  --  50*   AST (SGOT) 35  --  40  --  34   GLUCOSE 218*   < > 226* 194* 190*  189*    < > = values in this interval not displayed.       COVID LABS:  Results From Last 14 Days   Lab Units 12/03/21 0522 12/02/21 0319 12/01/21 0349 11/30/21 0337 11/29/21  0554 11/24/21 0319 11/23/21 0307 11/21/21 0327 11/21/21 0327 11/20/21 0301 11/20/21 0301   CRP mg/dL 2.04* 1.65* 1.47*   < > 5.53*   < > 0.68*   < > 1.62*   < > 2.40*   FERRITIN ng/mL  --   --   --   --   --   --   --   --  1,115.00*  --  1,032.00*   PROCALCITONIN ng/mL 0.22  --  0.13  --  0.27*   < > 0.09   < > 0.11  --   --    PROTIME Seconds  --   --   --   --   --   --   --   --  14.8*  --   --    INR   --   --   --   --   --   --   --   --  1.21*  --   --    TRIGLYCERIDES mg/dL  --   --   --   --   --   --  324*   --   --   --   --     < > = values in this interval not displayed.         Estimated Creatinine Clearance: 61.1 mL/min (by C-G formula based on SCr of 1.21 mg/dL).    Culture Results:    Microbiology Results (last 10 days)     Procedure Component Value - Date/Time    Respiratory Culture - Sputum, ET Suction [[id number]]  (Abnormal)  (Susceptibility) Collected: 11/25/21 1045    Lab Status: Edited Result - FINAL Specimen: Sputum from ET Suction Updated: 11/28/21 0924     Respiratory Culture Light growth (2+) Pseudomonas aeruginosa      Light growth (2+) Klebsiella pneumoniae ssp pneumoniae      No Normal Respiratory Abdirahman     Gram Stain Greater than 25 WBCs per low power field      No Epithelial cells per low power field      Many (4+) Gram negative bacilli      Few (2+) Mixed gram positive abdirahman    Susceptibility      Pseudomonas aeruginosa     KG     Cefazolin Resistant (C)  [1]      Ceftazidime Susceptible     Ciprofloxacin Susceptible     Gentamicin Susceptible     Imipenem Susceptible (C)  [1]      Levofloxacin Susceptible     Meropenem Susceptible (C)  [1]      Piperacillin + Tazobactam Susceptible                 [1]  Appended report. These results have been appended to a previously final verified report.             Susceptibility      Klebsiella pneumoniae ssp pneumoniae     KG     Ampicillin Resistant     Ampicillin + Sulbactam Intermediate     Cefepime Susceptible     Ceftazidime Susceptible     Ceftriaxone Susceptible     Gentamicin Susceptible     Imipenem Susceptible (C)  [1]      Levofloxacin Intermediate     Piperacillin + Tazobactam Susceptible     Tetracycline Resistant     Trimethoprim + Sulfamethoxazole Susceptible                 [1]  Appended report. These results have been appended to a previously final verified report.             Susceptibility Comments     Pseudomonas aeruginosa    The previously reported component CEFEPIME is no longer being reported. Previous result was Susceptible (<=1  ug/ml) (Reference Range: [Reference Range]) on 11/28/2021 at 0827 CST.    Klebsiella pneumoniae ssp pneumoniae    Cefazolin sensitivity will not be reported for Enterobacteriaceae in non-urine isolates. If cefazolin is preferred, please call the microbiology lab to request an E-test.  With the exception of urinary-sourced infections, aminoglycosides should not be used as monotherapy.                        Radiology:               Imaging Results (Last 72 Hours)     ** No results found for the last 72 hours. **                    Assessment/Plan     Active Hospital Problems    Diagnosis    • **Pneumonia due to COVID-19 virus    • Acute respiratory failure with hypoxia (HCC)    • History of pulmonary embolism    • Hyperglycemia    • Essential hypertension    Acute respiratory failure secondary to COVID-19 infection patient had been on max BiPAP setting for several days.  Intubated November 24.  On FiO2 of 100%.  Possible superimposed bacterial pneumonia with  Pseudomonas aeruginosa and Klebsiella.  On Zosyn  Acute kidney injury-essentially resolved nephrology following  Markedly elevated CRP on admission.  Significantly improved however trending back up slightly  Elevated procalcitonin-resolved  Diabetes mellitus per hemoglobin A1c of 6.7  New bilateral DVT in patient with history of DVT and PE in 2016.  Leukocytosis -trended back down today  Thrombocytopenia-new problem today questionable lab error versus trending down.      RECOMMENDATION:   · Continue zosyn.  Increased dose to 4.5 g for Pseudomonas dosing given improved renal function.( Day #8 of abx -was on meropenem first)  · Continue dexamethasone-increased to twice daily November 24  · Wean norepinephrine as tolerated  · Vent management per pulmonary  ·  Adjuvant therapy per hospitalist  · Monitor white blood count  · Monitor closely    Discussed with THERESA Hatfield MD  12/03/21  09:50 CST

## 2021-12-03 NOTE — PLAN OF CARE
Patient remains intubated and sedated. Attempted to contact spouse again. Multiple failed attempts from interdisciplinary team the past two days. Nursing will try to secure patient's daughter or son's contact information. Attempting to contact spouse to arrange a POC regarding goals of care and direction moving forward.     MIGUEL ANGEL Cano  12/3/2021

## 2021-12-03 NOTE — PROGRESS NOTES
[facility] Medicine Services  INPATIENT PROGRESS NOTE    Patient Name: Carlton Ritter  Date of Admission: 11/15/2021  Today's Date: 12/03/21  Length of Stay: 18  Primary Care Physician: Jose Luciano MD    Subjective   Chief Complaint: SOA    Intubated/Sedated  Afebrile  O2 sats have been in the 90's.  He was just moved/repositioned and sats currently low 80's, but nursing tells me he was 90's before being moved.      Review of Systems   Unable to perform ROS: Intubated          All pertinent negatives and positives are as above. All other systems have been reviewed and are negative unless otherwise stated.     Objective    Temp:  [98 °F (36.7 °C)-99.1 °F (37.3 °C)] 98.9 °F (37.2 °C)  Heart Rate:  [] 93  Resp:  [32-34] 34  BP: ()/(55-77) 115/62  Arterial Line BP: ()/(48-70) 131/62  FiO2 (%):  [100 %] 100 %  Physical Exam  Vitals reviewed.   Constitutional:       Appearance: He is well-developed.      Interventions: He is sedated and intubated.   HENT:      Head: Normocephalic and atraumatic.      Right Ear: External ear normal.      Left Ear: External ear normal.      Nose: Nose normal.   Eyes:      General: No scleral icterus.        Right eye: No discharge.         Left eye: No discharge.      Conjunctiva/sclera: Conjunctivae normal.      Pupils: Pupils are equal, round, and reactive to light.   Neck:      Thyroid: No thyromegaly.      Trachea: No tracheal deviation.   Cardiovascular:      Rate and Rhythm: Normal rate and regular rhythm.      Heart sounds: Normal heart sounds. No murmur heard.  No friction rub. No gallop.    Pulmonary:      Effort: Pulmonary effort is normal. No respiratory distress. He is intubated.      Breath sounds: No stridor. Decreased breath sounds and rhonchi present. No wheezing or rales.   Chest:      Chest wall: No tenderness.   Abdominal:      General: Bowel sounds are normal. There is no distension.      Palpations: Abdomen is  soft. There is no mass.      Tenderness: There is no abdominal tenderness. There is no guarding or rebound.      Hernia: No hernia is present.   Musculoskeletal:         General: No deformity. Normal range of motion.      Cervical back: Normal range of motion and neck supple.   Lymphadenopathy:      Cervical: No cervical adenopathy.   Skin:     General: Skin is warm and dry.      Coloration: Skin is not pale.      Findings: No erythema or rash.   Neurological:      Mental Status: He is unresponsive.      Cranial Nerves: No cranial nerve deficit.      Motor: No abnormal muscle tone.      Coordination: Coordination normal.      Deep Tendon Reflexes: Reflexes are normal and symmetric. Reflexes normal.           Results Review:  I have reviewed the labs, radiology results, and diagnostic studies.    Laboratory Data:   Results from last 7 days   Lab Units 12/03/21 0522 12/02/21 0319 12/01/21 0349   WBC 10*3/mm3 16.07* 18.61* 13.77*   HEMOGLOBIN g/dL 9.5* 10.1* 10.0*   HEMATOCRIT % 31.0* 33.3* 33.1*   PLATELETS 10*3/mm3 130* 158 168        Results from last 7 days   Lab Units 12/03/21  0522 12/03/21  0000 12/02/21 0319 12/01/21 2035 12/01/21 0349   SODIUM mmol/L 138  136 138 141   < > 145   POTASSIUM mmol/L 5.7*  5.6* 6.0* 5.8*   < > 5.5*   CHLORIDE mmol/L 101  100 101 104   < > 107   CO2 mmol/L 28.0  29.0 30.0* 31.0*   < > 31.0*   BUN mg/dL 93*  97* 90* 89*   < > 87*   CREATININE mg/dL 1.26  1.21 1.21 1.22   < > 1.04   CALCIUM mg/dL 7.7*  7.8* 7.7* 7.6*   < > 7.3*   BILIRUBIN mg/dL 0.5  --  0.5  --  0.5   ALK PHOS U/L 54  --  63  --  62   ALT (SGPT) U/L 50*  --  37  --  28   AST (SGOT) U/L 34  --  40  --  35   GLUCOSE mg/dL 190*  189* 194* 226*   < > 218*    < > = values in this interval not displayed.       Culture Data:   No results found for: BLOODCX, URINECX, WOUNDCX, MRSACX, RESPCX, STOOLCX    Radiology Data:   Imaging Results (Last 24 Hours)     ** No results found for the last 24 hours. **          I  have reviewed the patient's current medications.     Labs reviewed: leukocytosis, hyperkalemia    Telemetry reviewed    Telemetry independently interpreted by me:  NSR      Assessment/Plan     Active Hospital Problems    Diagnosis    • **Pneumonia due to COVID-19 virus    • Acute respiratory failure with hypoxia (HCC)    • History of pulmonary embolism    • Hyperglycemia    • Essential hypertension      1.  Acute Hypoxic respiratory failure  -Decadron  -Pulm consulted and following  -Tocilzumab given    2.  COVID-19 pneumonia  -Decadron  -Pulm consulted and following  -Tocilzumab given  -ID consulted and following    3.  HTN  -monitor    4.  History of PE  -Lovenox    5.  HLD  -Statin    6.  DVT  -Lovenox    7.  Superimposed bacterial pneumonia due to Pseudomonas Aeruginosa and Klebsiella  -IV Abx  -ID following    8.  Hyperkalemia  -Lokelema   -Nephrology following          Discharge Planning: continue in CCU, poor overall prognosis    Electronically signed by Julito Main MD, 12/03/21, 10:08 CST.

## 2021-12-03 NOTE — PROGRESS NOTES
PULMONARY AND CRITICAL CARE PROGRESS NOTE - [facility]    Patient: Carlton Ritter    1950    MR# [id number]    Acct# [id number]  12/03/21   09:08 CST  Referring Provider: Julito Main MD    Chief Complaint: Mechanically ventilated    Interval history: The patient is Covid positive and has been examined through the glass doors to prevent possible cross-contamination, unnecessary exposure and unnecessary use of PPE.   The patient remains sedated and intubated with propofol, fentanyl, and Levophed infusing.  O2 sat 83% on 14 PEEP, 1.0 FiO2. Day #9 on the ventilator. No ABG or chest x-ray to review today. No other aggravating or alleviating factors.     Meds:  albuterol sulfate HFA, 2 puff, Inhalation, Q4H - RT  artificial tears, , Both Eyes, Q4H  ascorbic acid, 500 mg, Oral, Daily  atorvastatin, 10 mg, Oral, Nightly  Beneprotein, 3 packet, Per G Tube, TID  budesonide-formoterol, 2 puff, Inhalation, BID - RT  cholecalciferol, 1,000 Units, Oral, Daily  dexamethasone, 6 mg, Oral, BID   Or  dexamethasone, 6 mg, Intravenous, BID  enoxaparin, 1 mg/kg, Subcutaneous, Q12H  famotidine, 20 mg, Oral, Daily  fenofibrate, 48 mg, Oral, Daily  guaiFENesin, 200 mg, Oral, TID  insulin lispro, 0-9 Units, Subcutaneous, Q6H  melatonin, 3 mg, Oral, Nightly  multivitamin with minerals, 1 tablet, Oral, Daily  piperacillin-tazobactam, 4.5 g, Intravenous, Q6H  polyethylene glycol, 17 g, Oral, Daily  rocuronium, 50 mg, Intravenous, Once  sodium zirconium cyclosilicate, 10 g, Nasogastric, Daily  thiamine, 100 mg, Oral, Daily  zinc sulfate, 220 mg, Oral, Daily      fentanyl 10 mcg/mL,  mcg/hr, Last Rate: 50 mcg/hr (12/03/21 0515)  norepinephrine, 0.02-0.3 mcg/kg/min, Last Rate: 0.04 mcg/kg/min (12/03/21 7069)  propofol, 5-75 mcg/kg/min, Last Rate: 60 mcg/kg/min (12/03/21 0822)  rocuronium (ZEMURON) infusion, 8-12 mcg/kg/min, Last Rate: Stopped (11/29/21 0205)  sodium chloride, 75 mL/hr, Last Rate: 75 mL/hr  (12/03/21 0320)      Review of Systems:   Cannot obtain due to mechanical ventilated state    Ventilator Settings:        Resp Rate (Set): 32     FiO2 (%): 100 %  PEEP/CPAP (cm H2O): 14 cm H20  Minute Ventilation (L/min) (Obs): 15.2 L/min  Resp Rate (Observed) Vent: 34  I:E Ratio (Set): 1:1.5  I:E Ratio (Obs): 1:1.10  PIP Observed (cm H2O): 39 cm H2O     Physical Exam:  Temp:  [98 °F (36.7 °C)-99.1 °F (37.3 °C)] 98.9 °F (37.2 °C)  Heart Rate:  [] 85  Resp:  [32-34] 34  BP: ()/(55-77) 110/65  Arterial Line BP: ()/(48-70) 114/54  FiO2 (%):  [100 %] 100 %    Intake/Output Summary (Last 24 hours) at 12/3/2021 0908  Last data filed at 12/3/2021 0320  Gross per 24 hour   Intake 5559.94 ml   Output 1750 ml   Net 3809.94 ml     SpO2 Percentage    12/03/21 0630 12/03/21 0700 12/03/21 0729   SpO2: 90% 91% 91%      GENERAL/CONSTITUTIONAL:  Pt is on vent. He appears very ill.  HEENT: atraumatic, normocephalic. Nutrition infusing to OG.  NOSE: normal  NECK: jugular veins nondistended  CHEST: no paradox, no retractions.  No respiratory distress.   Vent synchrony: yes  CARDIAC: normal rate  ABDOMEN: deferred  : deferred  EXTREMITIES: deferred  NEURO:  sedated, mechanically ventilated  SKIN: no jaundice.     Results from last 7 days   Lab Units 12/03/21  0522 12/02/21 0319 12/01/21  0349   WBC 10*3/mm3 16.07* 18.61* 13.77*   HEMOGLOBIN g/dL 9.5* 10.1* 10.0*   PLATELETS 10*3/mm3 130* 158 168     Results from last 7 days   Lab Units 12/03/21  0522 12/03/21  0000 12/02/21  0319 12/01/21 2035 12/01/21  0349   SODIUM mmol/L 138  136 138 141   < > 145   POTASSIUM mmol/L 5.7*  5.6* 6.0* 5.8*   < > 5.5*   BUN mg/dL 93*  97* 90* 89*   < > 87*   CREATININE mg/dL 1.26  1.21 1.21 1.22   < > 1.04   CRP mg/dL 2.04*  --  1.65*  --  1.47*   PROCALCITONIN ng/mL 0.22  --   --   --  0.13    < > = values in this interval not displayed.     Results from last 7 days   Lab Units 11/30/21  0805 11/28/21  1447 11/28/21  0322   PH,  ARTERIAL pH units 7.377 7.326* 7.326*   PCO2, ARTERIAL mm Hg 60.4* 60.4* 56.9*   PO2 ART mm Hg 76.4* 48.9* 67.4*   FIO2 % 100 100 100     Respiratory Culture   Date Value Ref Range Status   11/25/2021 Light growth (2+) Pseudomonas species (A)  Preliminary   11/25/2021 Light growth (2+) Gram Negative Bacilli (A)  Preliminary   11/25/2021 No Normal Respiratory Michelle (A)  Preliminary     Recent films:  No radiology results for the last day  Films reviewed personally by me.  My interpretation: none to review today     Pulmonary Assessment:  1. Acute respiratory failure with ARDS, very poor lung compliance   2. Bilateral COVID-19 pneumonia, vaccinated patient. Hospitalized 11/15.  Status post Tocilizumab for high inflammatory markers and hypoxia  3. Pseudomonas growth in respiratory cultures  4. Steroid-induced hyperglycemia  5. Personal history of remote pulmonary embolism  6. Obesity, BMI 31  7. Acute kidney injury-due to acute tubular necrosis    Recommend:   · D#9 ETT.  Continue mechanical ventilation titrate oxygen for saturation 90%. Remains on PEEP 14, FiO2 1.0.  · Continue dvt prophylaxis-Lovenox  · Continue stress ulcer prophylaxis while on vent-Pepcid  · Proning candidate: No, he does not tolerate  · Dexamethasone day 10 of 10; continue at BID dosing  · ABG/chest x-ray as needed  · Continue Symbicort and albuterol HFA  · Continue Robitussin  · Continue adjuvant therapy zinc, vitamin D, vitamin C  · Continue antibiotic per ID -zosyn  · Prognosis is poor    Electronically signed by KLAUS Kelly on 12/3/2021 at 09:08 CST    Physician substantive portion:  Patient remains mechanically ventilated, sedated in isolation.  He does not have accessory muscle use or paradox.  He remains on high-dose steroids at 12 mg/day of dexamethasone.  I am going to leave him on that for now.  He would ordinarily have already completed the course by today.  Exam shows him to have good synchrony.  No accessory muscle use.   He is ill-appearing.  No jaundice.  Recommend continue steroids at current dose.  Continue ventilation at current levels.  Saturation unfavorable.  Still hypercapnic.  Unable to back down on any of the ventilator parameters.  Prognosis still remains poor.    I have seen and examined patient personally, performing a face-to-face diagnostic evaluation with plan of care reviewed and developed with APRN and nursing staff. I have addended and/or modified the above history of present illness, physical examination, and assessment and plan to reflect my findings and impressions. Essential elements of the care plan were discussed with APRN above.  Agree with findings and assessment/plan as documented above.    Electronically signed by Reji Bhagat MD, on 12/3/2021, 15:28 CST

## 2021-12-03 NOTE — CASE MANAGEMENT/SOCIAL WORK
Continued Stay Note  [facility]     Patient Name: Carlton Ritter  MRN: [id number]  Today's Date: 12/3/2021    Admit Date: 11/15/2021     Discharge Plan     Row Name 12/03/21 1007       Plan    Plan unclear    Plan Comments Patient remains on vent.  Needs/plan unclear.               Discharge Codes    No documentation.                     MONICA Jacobson

## 2021-12-04 NOTE — PROGRESS NOTES
Nephrology ([facility]) Progress Note      Patient:  Carlton Ritter  YOB: 1950  Date of Service: 12/4/2021  MRN: [id number]   Acct: [id number]   Primary Care Physician: Jose Luciano MD  Advance Directive:   Code Status and Medical Interventions:   Ordered at: 11/16/21 0054     Level Of Support Discussed With:    Patient     Code Status (Patient has no pulse and is not breathing):    CPR (Attempt to Resuscitate)     Medical Interventions (Patient has pulse or is breathing):    Full Support     Admit Date: 11/15/2021       Hospital Day: 19  Referring Provider: No Known Provider      Patient personally seen and examined.  Complete chart including Consults, Notes, Operative Reports, Labs, Cardiology, and Radiology studies reviewed as able.    Chief complaint: Abnormal labs.    Subjective:  Carlton Ritter is a 71 y.o. male for whom we were consulted for evaluation and treatment of acute kidney injury and hyperkalemia.  Admitted on 11/15 with COVID-19 pneumonia. Required intubation on 11/24 due to worsening hypoxia.  Levophed also started on 11/24. Starting on 11/24 creatinine and potassium levels started climbing. Patient started on TID Lokelma on 11/25.  Remained persistently hyperkalemic, but creatinine and potassium levels have been improving the last few days  Patient remains intubated and sedated.  He is still on very low-dose of Levophed/0.2 mcg.  He has responded to IV fluid and has significant improvement of renal function.  However he remains on 100% FiO2 with a PEEP of 14.    Patient remained intubated/sedated with same vent setting      Allergies:  Patient has no known allergies.    Home Meds:  Medications Prior to Admission   Medication Sig Dispense Refill Last Dose   • apixaban (ELIQUIS) 5 MG tablet tablet Take 1 tablet by mouth Every 12 (Twelve) Hours. 60 tablet 2 Past Week at Unknown time   • ezetimibe (ZETIA) 10 MG tablet Take 10 mg by mouth Daily.   Past  Week at Unknown time   • olmesartan (BENICAR) 20 MG tablet Take 40 mg by mouth Daily.   Past Week at Unknown time       Medicines:  Current Facility-Administered Medications   Medication Dose Route Frequency Provider Last Rate Last Admin   • acetaminophen (TYLENOL) tablet 650 mg  650 mg Oral Q4H PRN Nikolas Gray MD   650 mg at 11/24/21 0806    Or   • acetaminophen (TYLENOL) suppository 650 mg  650 mg Rectal Q4H PRN Nikolas Gray MD       • acetaminophen (TYLENOL) tablet 650 mg  650 mg Oral Q4H PRN Nikolas Gray MD        Or   • acetaminophen (TYLENOL) suppository 650 mg  650 mg Rectal Q4H PRN Nikolas Gray MD       • albuterol sulfate HFA (PROVENTIL HFA;VENTOLIN HFA;PROAIR HFA) inhaler 2 puff  2 puff Inhalation Q4H - RT Nikolas Gray MD   2 puff at 12/04/21 1046   • artificial tears ophthalmic ointment   Both Eyes Q4H Charles Hamilton MD   Given at 12/04/21 1201   • ascorbic acid (VITAMIN C) tablet 500 mg  500 mg Oral Daily Nikolas Gray MD   500 mg at 12/04/21 0803   • atorvastatin (LIPITOR) tablet 10 mg  10 mg Oral Nightly Nikolas Gray MD   10 mg at 12/03/21 2010   • Beneprotein packet 3 packet  3 packet Per G Tube TID Julito Main MD   3 packet at 12/04/21 0800   • benzonatate (TESSALON) capsule 200 mg  200 mg Oral Q4H PRN Nikolas Gray MD   200 mg at 11/24/21 0807   • budesonide-formoterol (SYMBICORT) 160-4.5 MCG/ACT inhaler 2 puff  2 puff Inhalation BID - RT Aliya Fam APRN   2 puff at 12/04/21 0706   • cholecalciferol (VITAMIN D3) tablet 1,000 Units  1,000 Units Oral Daily Nikolas Gray MD   1,000 Units at 12/04/21 0803   • dexamethasone (DECADRON) tablet 6 mg  6 mg Oral BID Markus Hamilton MD   6 mg at 12/04/21 0803    Or   • dexamethasone (DECADRON) injection 6 mg  6 mg Intravenous BID Markus Hamilton MD   6 mg at 12/02/21 2101   • dextromethorphan polistirex ER (DELSYM) 30 MG/5ML oral suspension 60 mg  60 mg Oral Q12H PRN Nikolas Gray MD   60 mg  at 11/23/21 2011   • dextrose (D50W) (25 g/50 mL) IV injection 25 g  25 g Intravenous Q15 Min PRN Nikolas Gray MD       • dextrose (D50W) (25 g/50 mL) IV injection 50 mL  50 mL Intravenous Q1H PRN Aravind Landon MD   50 mL at 11/26/21 1235   • dextrose (GLUTOSE) oral gel 15 g  15 g Oral Q15 Min PRN Nikolas Gray MD       • enoxaparin (LOVENOX) syringe 80 mg  1 mg/kg Subcutaneous Q12H Julito Main MD   80 mg at 12/04/21 0814   • famotidine (PEPCID) tablet 20 mg  20 mg Oral Daily Aravind Landon MD   20 mg at 12/04/21 0803   • fenofibrate (TRICOR) tablet 48 mg  48 mg Oral Daily Aravind Landon MD   48 mg at 12/04/21 0803   • fentaNYL 2500 mcg/250 mL NS infusion   mcg/hr Intravenous Titrated Julito Main MD 20 mL/hr at 12/04/21 1027 200 mcg/hr at 12/04/21 1027   • glucagon (human recombinant) (GLUCAGEN DIAGNOSTIC) injection 1 mg  1 mg Subcutaneous Q15 Min PRN Nikolas Gray MD       • guaiFENesin (ROBITUSSIN) 100 MG/5ML oral solution 200 mg  200 mg Oral TID Aliya Fam APRN   200 mg at 12/04/21 0804   • insulin lispro (humaLOG) injection 0-9 Units  0-9 Units Subcutaneous Q6H Aravind Landon MD   4 Units at 12/04/21 1209   • melatonin tablet 3 mg  3 mg Oral Nightly Nikolas Gray MD   3 mg at 12/03/21 2011   • multivitamin with minerals 1 tablet  1 tablet Oral Daily Nikolas Gray MD   1 tablet at 12/04/21 0803   • norepinephrine (LEVOPHED) 8 mg in 250 mL NS infusion (premix)  0.02-0.3 mcg/kg/min Intravenous Titrated Aravind Landon MD 6.73 mL/hr at 12/04/21 0930 0.04 mcg/kg/min at 12/04/21 0930   • ondansetron (ZOFRAN) tablet 4 mg  4 mg Oral Q6H PRN Nikolas Gray MD        Or   • ondansetron (ZOFRAN) injection 4 mg  4 mg Intravenous Q6H PRN Nikolas Gray MD       • polyethylene glycol (MIRALAX) packet 17 g  17 g Oral Daily Aravind Landon MD   17 g at 12/01/21 0806   • propofol (DIPRIVAN) infusion 10 mg/mL 100 mL  5-75 mcg/kg/min Intravenous Titrated  Aravind Landon MD 37.7 mL/hr at 12/04/21 1201 70 mcg/kg/min at 12/04/21 1201   • rocuronium (ZEMURON) 500 mg in sodium chloride 0.9 % 100 mL infusion  8-12 mcg/kg/min Intravenous Titrated Charles Hamilton MD   Held at 11/29/21 0205   • rocuronium (ZEMURON) injection 22.4 mg  250 mcg/kg Intravenous Once PRN Charles Hamilton MD       • rocuronium (ZEMURON) injection 50 mg  50 mg Intravenous Once Aravind Landon MD       • sodium chloride 0.45 % infusion  75 mL/hr Intravenous Continuous Mac Ng MD 75 mL/hr at 12/04/21 0922 75 mL/hr at 12/04/21 0922   • sodium chloride nasal spray 2 spray  2 spray Each Nare PRN Kelsey Zuñiga MD       • sodium zirconium cyclosilicate (LOKELMA) pack 10 g  10 g Nasogastric Daily Mac Ng MD   10 g at 12/04/21 0800   • thiamine (VITAMIN B-1) tablet 100 mg  100 mg Oral Daily Julito Main MD   100 mg at 12/04/21 0803   • zinc sulfate (ZINCATE) capsule 220 mg  220 mg Oral Daily Nikolas Gray MD   220 mg at 12/04/21 0804       Past Medical History:  Past Medical History:   Diagnosis Date   • Acute saddle pulmonary embolism with acute cor pulmonale (HCC)    • Hyperlipidemia    • Hypertension        Past Surgical History:  Past Surgical History:   Procedure Laterality Date   • CARDIAC CATHETERIZATION N/A 10/21/2016    Procedure: Thrombolytic Therapy;  Surgeon: Andrew Akins MD;  Location:  [facility];  Service:    • EKOS CATHETER REMOVAL Right 10/22/2016    Procedure: Ekos catheter removal with stent placement;  Surgeon: Percy Chavarria MD;  Location:  [facility];  Service:        Family History  Family History   Problem Relation Age of Onset   • Heart disease Mother    • Hypertension Mother    • Heart disease Father    • Hypertension Brother        Social History  Social History     Socioeconomic History   • Marital status:    Tobacco Use   • Smoking status: Never Smoker   • Tobacco comment: Wife is surrogate decision-maker    Substance and Sexual Activity   • Alcohol use: No   • Drug use: No   • Sexual activity: Defer         Review of Systems:  Unable to obtain due to intubated and sedated    Objective:  Patient Vitals for the past 24 hrs:   BP Temp Temp src Pulse Resp SpO2 Weight   12/04/21 1200 105/61 96.7 °F (35.9 °C) Axillary 96 -- (!) 76 % --   12/04/21 1100 105/65 -- -- 93 -- (!) 75 % --   12/04/21 1046 -- -- -- 95 (!) 34 (!) 77 % --   12/04/21 1000 90/64 -- -- 94 -- (!) 79 % --   12/04/21 0900 94/68 -- -- 92 -- (!) 78 % --   12/04/21 0800 90/62 -- -- 92 -- (!) 84 % --   12/04/21 0730 -- 96.9 °F (36.1 °C) Axillary -- -- -- --   12/04/21 0706 -- -- -- 92 (!) 34 (!) 85 % --   12/04/21 0700 94/62 -- -- 93 -- (!) 84 % --   12/04/21 0600 (!) 89/63 -- -- 90 -- (!) 84 % 85.8 kg (189 lb 3.2 oz)   12/04/21 0500 100/62 -- -- 91 -- (!) 84 % --   12/04/21 0400 93/62 97.2 °F (36.2 °C) Axillary 89 -- (!) 86 % --   12/04/21 0300 105/53 -- -- 93 -- 90 % --   12/04/21 0257 -- -- -- 91 (!) 34 90 % --   12/04/21 0200 93/63 -- -- 91 -- 90 % --   12/04/21 0100 90/56 -- -- 91 -- (!) 89 % --   12/04/21 0000 (!) 89/56 -- -- 87 -- (!) 89 % --   12/03/21 2300 97/58 97.1 °F (36.2 °C) Axillary 95 -- 91 % --   12/03/21 2227 -- -- -- 89 (!) 32 90 % --   12/03/21 2200 (!) 87/58 -- -- 91 -- -- --   12/03/21 2100 96/57 -- -- 94 -- 92 % --   12/03/21 2000 95/64 -- -- 92 -- (!) 89 % --   12/03/21 1900 (!) 88/61 97.1 °F (36.2 °C) Axillary 90 -- (!) 89 % --   12/03/21 1831 -- -- -- 92 (!) 32 (!) 88 % --   12/03/21 1800 103/59 -- -- 92 -- (!) 86 % --   12/03/21 1745 -- -- -- 93 -- (!) 87 % --   12/03/21 1730 -- -- -- 92 -- (!) 87 % --   12/03/21 1715 -- -- -- 93 -- (!) 86 % --   12/03/21 1700 94/59 -- -- 93 -- (!) 86 % --   12/03/21 1645 -- -- -- 92 -- (!) 86 % --   12/03/21 1630 -- -- -- 90 -- (!) 88 % --   12/03/21 1615 -- 97.2 °F (36.2 °C) Axillary 89 -- (!) 88 % --   12/03/21 1600 (!) 89/64 -- -- 91 -- (!) 88 % --   12/03/21 1545 -- -- -- 91 -- (!) 88 % --    12/03/21 1537 -- -- -- 90 (!) 34 (!) 88 % --   12/03/21 1530 -- -- -- 91 -- (!) 87 % --   12/03/21 1515 -- -- -- 91 -- (!) 87 % --   12/03/21 1500 (!) 87/65 -- -- 89 -- (!) 84 % --   12/03/21 1445 -- -- -- 93 -- (!) 87 % --   12/03/21 1430 -- -- -- 90 -- (!) 86 % --   12/03/21 1415 -- -- -- 92 -- (!) 86 % --   12/03/21 1400 101/65 -- -- 93 -- (!) 86 % --   12/03/21 1345 -- -- -- 91 -- (!) 87 % --   12/03/21 1330 -- -- -- 91 -- (!) 87 % --   12/03/21 1315 -- -- -- 91 -- (!) 86 % --   12/03/21 1300 106/80 -- -- 90 -- (!) 85 % --       Intake/Output Summary (Last 24 hours) at 12/4/2021 1255  Last data filed at 12/4/2021 1201  Gross per 24 hour   Intake 5177 ml   Output 1300 ml   Net 3877 ml   In person physical examination was not done as he  has COVID-19 pneumonia.  This was to prevent unnecessary exposure of COVID-19 as well as unnecessary use of PPE.  However patient was seen through the glass door.  Finding of physical exam was discussed with registered nurse.  General: intubated, sedated  HEENT: Normocephalic atraumatic head/orotracheal intubation.  Chest:  equal chest rise  CVS: regular rate and rhythm  Abdominal: soft, nontender, positive bowel sounds  Extremities: no cyanosis or edema  Skin: warm and dry without rash      Labs:  Results from last 7 days   Lab Units 12/04/21  0608 12/03/21  0522 12/02/21  0319   WBC 10*3/mm3 18.45* 16.07* 18.61*   HEMOGLOBIN g/dL 9.2* 9.5* 10.1*   HEMATOCRIT % 30.6* 31.0* 33.3*   PLATELETS 10*3/mm3 132* 130* 158         Results from last 7 days   Lab Units 12/04/21  0608 12/03/21 2033 12/03/21 0522 12/03/21  0000 12/02/21 0319   SODIUM mmol/L 132* 135* 138  136   < > 141   POTASSIUM mmol/L 5.6* 5.4* 5.7*  5.6*   < > 5.8*   CHLORIDE mmol/L 98 98 101  100   < > 104   CO2 mmol/L 24.0 25.0 28.0  29.0   < > 31.0*   BUN mg/dL 108* 98* 93*  97*   < > 89*   CREATININE mg/dL 1.36* 1.29* 1.26  1.21   < > 1.22   CALCIUM mg/dL 7.7* 7.5* 7.7*  7.8*   < > 7.6*   BILIRUBIN mg/dL  0.6  --  0.5  --  0.5   ALK PHOS U/L 55  --  54  --  63   ALT (SGPT) U/L 60*  --  50*  --  37   AST (SGOT) U/L 38  --  34  --  40   GLUCOSE mg/dL 186* 177* 190*  189*   < > 226*    < > = values in this interval not displayed.       Radiology:   Imaging Results (Last 72 Hours)     ** No results found for the last 72 hours. **          Culture:  Respiratory Culture   Date Value Ref Range Status   11/25/2021 Light growth (2+) Pseudomonas aeruginosa (A)  Final   11/25/2021 (A)  Final    Light growth (2+) Klebsiella pneumoniae ssp pneumoniae   11/25/2021 No Normal Respiratory Michelle (A)  Final         Assessment   1.  Acute kidney injury/improving.  2.  Acute tubular necrosis.  3.  Recurrent hyperkalemia   4.  Bilateral COVID-19 pneumonia.  5.  Acute respiratory failure/intubated.  6.  Metabolic alkalosis.  7.  Hypernatremia now resolved.    Plan:  1.  Decrease IV fluid.  2.  Resume Lokelma.  4.  Overall prognosis looks poor.      Mac Ng MD  12/4/2021  12:55 CST

## 2021-12-04 NOTE — PROGRESS NOTES
[facility] Medicine Services  INPATIENT PROGRESS NOTE    Patient Name: Carlton Ritter  Date of Admission: 11/15/2021  Today's Date: 12/04/21  Length of Stay: 19  Primary Care Physician: Jose Luciano MD    Subjective   Chief Complaint: SOA    Intubated/Sedated  Oxygen sats 77% on 100% FiO2 and 14 of PEEP  He is breathing 32-34 times per minute  Wife told palliative care yesterday she wants him to remain a full code      Review of Systems   Unable to perform ROS: Intubated          All pertinent negatives and positives are as above. All other systems have been reviewed and are negative unless otherwise stated.     Objective    Temp:  [96.9 °F (36.1 °C)-97.5 °F (36.4 °C)] 96.9 °F (36.1 °C)  Heart Rate:  [87-95] 95  Resp:  [32-34] 34  BP: ()/(53-80) 94/68  Arterial Line BP: ()/(52-66) 97/54  FiO2 (%):  [100 %] 100 %  Physical Exam  Vitals reviewed.   Constitutional:       Appearance: He is well-developed.      Interventions: He is sedated and intubated.   HENT:      Head: Normocephalic and atraumatic.      Right Ear: External ear normal.      Left Ear: External ear normal.      Nose: Nose normal.   Eyes:      General: No scleral icterus.        Right eye: No discharge.         Left eye: No discharge.      Conjunctiva/sclera: Conjunctivae normal.      Pupils: Pupils are equal, round, and reactive to light.   Neck:      Thyroid: No thyromegaly.      Trachea: No tracheal deviation.   Cardiovascular:      Rate and Rhythm: Normal rate and regular rhythm.      Heart sounds: Normal heart sounds. No murmur heard.  No friction rub. No gallop.    Pulmonary:      Effort: Pulmonary effort is normal. No respiratory distress. He is intubated.      Breath sounds: No stridor. Decreased breath sounds and rhonchi present. No wheezing or rales.   Chest:      Chest wall: No tenderness.   Abdominal:      General: Bowel sounds are normal. There is no distension.      Palpations: Abdomen  is soft. There is no mass.      Tenderness: There is no abdominal tenderness. There is no guarding or rebound.      Hernia: No hernia is present.   Musculoskeletal:         General: No deformity. Normal range of motion.      Cervical back: Normal range of motion and neck supple.   Lymphadenopathy:      Cervical: No cervical adenopathy.   Skin:     General: Skin is warm and dry.      Coloration: Skin is not pale.      Findings: No erythema or rash.   Neurological:      Mental Status: He is unresponsive.      Cranial Nerves: No cranial nerve deficit.      Motor: No abnormal muscle tone.      Coordination: Coordination normal.      Deep Tendon Reflexes: Reflexes are normal and symmetric. Reflexes normal.           Results Review:  I have reviewed the labs, radiology results, and diagnostic studies.    Laboratory Data:   Results from last 7 days   Lab Units 12/04/21  0608 12/03/21 0522 12/02/21 0319   WBC 10*3/mm3 18.45* 16.07* 18.61*   HEMOGLOBIN g/dL 9.2* 9.5* 10.1*   HEMATOCRIT % 30.6* 31.0* 33.3*   PLATELETS 10*3/mm3 132* 130* 158        Results from last 7 days   Lab Units 12/04/21  0608 12/03/21 2033 12/03/21 0522 12/03/21  0000 12/02/21 0319   SODIUM mmol/L 132* 135* 138  136   < > 141   POTASSIUM mmol/L 5.6* 5.4* 5.7*  5.6*   < > 5.8*   CHLORIDE mmol/L 98 98 101  100   < > 104   CO2 mmol/L 24.0 25.0 28.0  29.0   < > 31.0*   BUN mg/dL 108* 98* 93*  97*   < > 89*   CREATININE mg/dL 1.36* 1.29* 1.26  1.21   < > 1.22   CALCIUM mg/dL 7.7* 7.5* 7.7*  7.8*   < > 7.6*   BILIRUBIN mg/dL 0.6  --  0.5  --  0.5   ALK PHOS U/L 55  --  54  --  63   ALT (SGPT) U/L 60*  --  50*  --  37   AST (SGOT) U/L 38  --  34  --  40   GLUCOSE mg/dL 186* 177* 190*  189*   < > 226*    < > = values in this interval not displayed.       Culture Data:   No results found for: BLOODCX, URINECX, WOUNDCX, MRSACX, RESPCX, STOOLCX    Radiology Data:   Imaging Results (Last 24 Hours)     ** No results found for the last 24 hours. **           I have reviewed the patient's current medications.     Labs reviewed: leukocytosis, hyperkalemia    Telemetry reviewed    Telemetry independently interpreted by me:  NSR      Assessment/Plan     Active Hospital Problems    Diagnosis    • **Pneumonia due to COVID-19 virus    • Acute respiratory failure with hypoxia (HCC)    • History of pulmonary embolism    • Hyperglycemia    • Essential hypertension      1.  Acute Hypoxic respiratory failure  -Decadron  -Pulm consulted and following  -Tocilzumab given    2.  COVID-19 pneumonia  -Decadron  -Pulm consulted and following  -Tocilzumab given  -ID consulted and following    3.  HTN  -monitor    4.  History of PE  -Lovenox    5.  HLD  -Statin    6.  DVT  -Lovenox    7.  Superimposed bacterial pneumonia due to Pseudomonas Aeruginosa and Klebsiella  -IV Abx  -ID following    8.  Hyperkalemia  -Lokelema   -Nephrology following    Called wife to discuss situation, prognosis.  She did not answer.  I also reached out two days ago with no answer, but was able to speak with the daughter in CCU.  Will continue to try to reach wife.      Discharge Planning: continue in CCU, poor overall prognosis    Electronically signed by Julito Main MD, 12/04/21, 10:51 CST.

## 2021-12-04 NOTE — PROGRESS NOTES
PULMONARY AND CRITICAL CARE PROGRESS NOTE - [facility]    Patient: Carlton Ritter    1950    MR# [id number]    Acct# [id number]  12/04/21   12:32 CST  Referring Provider: Julito Main MD    Chief Complaint: Mechanically ventilated    Interval history: Patient remains on the ventilator, sedated, mechanically ventilated.  In Covid isolation.  He remains on elevated dose of dexamethasone sedated unable provide any history.  He remains on full dose anticoagulation for DVT    Meds:  albuterol sulfate HFA, 2 puff, Inhalation, Q4H - RT  artificial tears, , Both Eyes, Q4H  ascorbic acid, 500 mg, Oral, Daily  atorvastatin, 10 mg, Oral, Nightly  Beneprotein, 3 packet, Per G Tube, TID  budesonide-formoterol, 2 puff, Inhalation, BID - RT  cholecalciferol, 1,000 Units, Oral, Daily  dexamethasone, 6 mg, Oral, BID   Or  dexamethasone, 6 mg, Intravenous, BID  enoxaparin, 1 mg/kg, Subcutaneous, Q12H  famotidine, 20 mg, Oral, Daily  fenofibrate, 48 mg, Oral, Daily  guaiFENesin, 200 mg, Oral, TID  insulin lispro, 0-9 Units, Subcutaneous, Q6H  melatonin, 3 mg, Oral, Nightly  multivitamin with minerals, 1 tablet, Oral, Daily  polyethylene glycol, 17 g, Oral, Daily  rocuronium, 50 mg, Intravenous, Once  sodium zirconium cyclosilicate, 10 g, Nasogastric, Daily  thiamine, 100 mg, Oral, Daily  zinc sulfate, 220 mg, Oral, Daily      fentanyl 10 mcg/mL,  mcg/hr, Last Rate: 200 mcg/hr (12/04/21 1027)  norepinephrine, 0.02-0.3 mcg/kg/min, Last Rate: 0.04 mcg/kg/min (12/04/21 0930)  propofol, 5-75 mcg/kg/min, Last Rate: 70 mcg/kg/min (12/04/21 1201)  rocuronium (ZEMURON) infusion, 8-12 mcg/kg/min, Last Rate: Stopped (11/29/21 0205)  sodium chloride, 75 mL/hr, Last Rate: 75 mL/hr (12/04/21 0922)      Review of Systems:   Cannot obtain due to mechanical ventilated state    Ventilator Settings:        Resp Rate (Set): 32     FiO2 (%): 100 %  PEEP/CPAP (cm H2O): 14 cm H20  Minute Ventilation (L/min) (Obs):  15.4 L/min  Resp Rate (Observed) Vent: 32  I:E Ratio (Set): 1:1.5  I:E Ratio (Obs): 1.70:1  PIP Observed (cm H2O): 40 cm H2O     Physical Exam:  Temp:  [96.7 °F (35.9 °C)-97.5 °F (36.4 °C)] 96.7 °F (35.9 °C)  Heart Rate:  [87-96] 96  Resp:  [32-34] 34  BP: ()/(53-80) 105/61  Arterial Line BP: ()/(52-66) 106/62  FiO2 (%):  [100 %] 100 %    Intake/Output Summary (Last 24 hours) at 12/4/2021 1232  Last data filed at 12/4/2021 1201  Gross per 24 hour   Intake 5524 ml   Output 1300 ml   Net 4224 ml     SpO2 Percentage    12/04/21 0900 12/04/21 1046 12/04/21 1200   SpO2: (!) 78% (!) 77% (!) 76%      GENERAL/CONSTITUTIONAL:  Pt is on vent. He appears gravely ill  HEENT: atraumatic, normocephalic. Nutrition infusing to OG.  NOSE: normal no discharge  NECK: jugular veins nondistended no visible goiter  CHEST: no paradox, no retractions.  No respiratory distress.   Vent synchrony: yes  CARDIAC: normal rate  ABDOMEN: deferred  : deferred  EXTREMITIES: deferred  NEURO: Deeply sedated, mechanically ventilated; no seizures no tremors or fasciculations  SKIN: no jaundice.     Results from last 7 days   Lab Units 12/04/21  0608 12/03/21 0522 12/02/21  0319   WBC 10*3/mm3 18.45* 16.07* 18.61*   HEMOGLOBIN g/dL 9.2* 9.5* 10.1*   PLATELETS 10*3/mm3 132* 130* 158     Results from last 7 days   Lab Units 12/04/21  0608 12/03/21 2033 12/03/21  0522 12/01/21 2035 12/01/21  0349   SODIUM mmol/L 132* 135* 138  136   < > 145   POTASSIUM mmol/L 5.6* 5.4* 5.7*  5.6*   < > 5.5*   BUN mg/dL 108* 98* 93*  97*   < > 87*   CREATININE mg/dL 1.36* 1.29* 1.26  1.21   < > 1.04   CRP mg/dL 1.50*  --  2.04*   < > 1.47*   PROCALCITONIN ng/mL  --   --  0.22  --  0.13    < > = values in this interval not displayed.     Results from last 7 days   Lab Units 12/04/21  0301 11/30/21  0805 11/28/21  1447   PH, ARTERIAL pH units 7.256* 7.377 7.326*   PCO2, ARTERIAL mm Hg 68.0* 60.4* 60.4*   PO2 ART mm Hg 63.6* 76.4* 48.9*   FIO2 % 100 100 100      Respiratory Culture   Date Value Ref Range Status   11/25/2021 Light growth (2+) Pseudomonas species (A)  Preliminary   11/25/2021 Light growth (2+) Gram Negative Bacilli (A)  Preliminary   11/25/2021 No Normal Respiratory Michelle (A)  Preliminary     Recent films:  No radiology results for the last day  Films reviewed personally by me.  My interpretation: none to review today     Pulmonary Assessment:  1. Acute respiratory failure with ARDS, very poor lung compliance   2. Bilateral COVID-19 pneumonia, vaccinated patient. Hospitalized 11/15.  Status post Tocilizumab for high inflammatory markers and hypoxia  3. Pseudomonas growth in respiratory cultures  4. Steroid-induced hyperglycemia  5. Personal history of remote pulmonary embolism  6. Obesity, BMI 31  7. Acute kidney injury-due to acute tubular necrosis    Recommend:   · D#10 ETT.  Continue mechanical ventilation titrate oxygen for saturation 90%.  Remains on maximal ventilation, worsening respiratory acidosis, poor prognosis  · Continue dvt prophylaxis-on Lovenox for DVT  · Continue stress ulcer prophylaxis while on vent-Pepcid  · Continue dexamethasone; does not appear improved on twice daily dosing  · ABG/chest x-ray as needed  · Continue Symbicort and albuterol HFA  · Continue Robitussin  · Continue adjuvant therapy zinc, vitamin D, vitamin C  · Continue antibiotic per ID -zosyn for Pseudomonas, Klebsiella  · Prognosis is poor   · Continue fentanyl, propofol, try to minimize doses    Electronically signed by Reji Bhagat MD on 12/4/2021 at 12:32 CST

## 2021-12-04 NOTE — PROGRESS NOTES
[facility] Medicine Services  Advance Care Planning      Carlton Ritter  12/04/21  17:34 CST      An advanced care planning conversation was facilitated by myself.     Patient's wife and daughter were present and agreeable to the discussion.    Content discussed was prognosis, code status    The healthcare surrogate has been designated as the patient's wife     CODE STATUS will be full code.  Limited interventions include--all interventions.       Total time spent in coordination of advance care planning was 20 minutes.       Electronically signed by Julito Main MD, 12/04/21, 17:34 CST.

## 2021-12-04 NOTE — PROGRESS NOTES
INFECTIOUS DISEASES PROGRESS NOTE    Patient:  Carlton Ritter  YOB: 1950  MRN: [id number]   Admit date: 11/15/2021   Admitting Physician: Julito Main MD  Primary Care Physician: Jose Luciano MD      Chief Complaint: Unobtainable from patient on ventilator      Interval History: Patient remains on maximal ventilator settings.  O2 sats in general left trended down into the 70s but are remaining stable.    Noted Dr Moore had phone conversation with patient's wife yesterday    Allergies: No Known Allergies    Current Meds:     Current Facility-Administered Medications:   •  acetaminophen (TYLENOL) tablet 650 mg, 650 mg, Oral, Q4H PRN, 650 mg at 11/24/21 0806 **OR** acetaminophen (TYLENOL) suppository 650 mg, 650 mg, Rectal, Q4H PRN, Nikolas Gray MD  •  acetaminophen (TYLENOL) tablet 650 mg, 650 mg, Oral, Q4H PRN **OR** acetaminophen (TYLENOL) suppository 650 mg, 650 mg, Rectal, Q4H PRN, Nikolas Gray MD  •  albuterol sulfate HFA (PROVENTIL HFA;VENTOLIN HFA;PROAIR HFA) inhaler 2 puff, 2 puff, Inhalation, Q4H - RT, Nikolas Gray MD, 2 puff at 12/04/21 1046  •  artificial tears ophthalmic ointment, , Both Eyes, Q4H, Charles Hamilton MD, Given at 12/04/21 1201  •  ascorbic acid (VITAMIN C) tablet 500 mg, 500 mg, Oral, Daily, Nikolas Gray MD, 500 mg at 12/04/21 0803  •  atorvastatin (LIPITOR) tablet 10 mg, 10 mg, Oral, Nightly, Nikolas Gray MD, 10 mg at 12/03/21 2010  •  Beneprotein packet 3 packet, 3 packet, Per G Tube, TID, Julito Main MD, 3 packet at 12/04/21 0800  •  benzonatate (TESSALON) capsule 200 mg, 200 mg, Oral, Q4H PRN, Nikolas Gray MD, 200 mg at 11/24/21 0807  •  budesonide-formoterol (SYMBICORT) 160-4.5 MCG/ACT inhaler 2 puff, 2 puff, Inhalation, BID - RT, Aliya Fam APRN, 2 puff at 12/04/21 0706  •  cholecalciferol (VITAMIN D3) tablet 1,000 Units, 1,000 Units, Oral, Daily, Nikolas Gray MD, 1,000 Units at 12/04/21 0803  •   dexamethasone (DECADRON) tablet 6 mg, 6 mg, Oral, BID, 6 mg at 12/04/21 0803 **OR** dexamethasone (DECADRON) injection 6 mg, 6 mg, Intravenous, BID, Markus Hamilton MD, 6 mg at 12/02/21 2101  •  dextromethorphan polistirex ER (DELSYM) 30 MG/5ML oral suspension 60 mg, 60 mg, Oral, Q12H PRN, Nikolas Gray MD, 60 mg at 11/23/21 2011  •  dextrose (D50W) (25 g/50 mL) IV injection 25 g, 25 g, Intravenous, Q15 Min PRN, Nikolas Gray MD  •  dextrose (D50W) (25 g/50 mL) IV injection 50 mL, 50 mL, Intravenous, Q1H PRN, Aravind Landon MD, 50 mL at 11/26/21 1235  •  dextrose (GLUTOSE) oral gel 15 g, 15 g, Oral, Q15 Min PRN, Nikolas Gray MD  •  enoxaparin (LOVENOX) syringe 80 mg, 1 mg/kg, Subcutaneous, Q12H, Julito Main MD, 80 mg at 12/04/21 0814  •  famotidine (PEPCID) tablet 20 mg, 20 mg, Oral, Daily, Aravind Landon MD, 20 mg at 12/04/21 0803  •  fenofibrate (TRICOR) tablet 48 mg, 48 mg, Oral, Daily, Aravind Landon MD, 48 mg at 12/04/21 0803  •  fentaNYL 2500 mcg/250 mL NS infusion,  mcg/hr, Intravenous, Titrated, Julito Main MD, Last Rate: 20 mL/hr at 12/04/21 1027, 200 mcg/hr at 12/04/21 1027  •  glucagon (human recombinant) (GLUCAGEN DIAGNOSTIC) injection 1 mg, 1 mg, Subcutaneous, Q15 Min PRN, Nikolas Gray MD  •  guaiFENesin (ROBITUSSIN) 100 MG/5ML oral solution 200 mg, 200 mg, Oral, TID, Aliya Fam APRN, 200 mg at 12/04/21 0804  •  insulin lispro (humaLOG) injection 0-9 Units, 0-9 Units, Subcutaneous, Q6H, Aravind Landon MD, 4 Units at 12/04/21 1209  •  melatonin tablet 3 mg, 3 mg, Oral, Nightly, Nikolas Gray MD, 3 mg at 12/03/21 2011  •  multivitamin with minerals 1 tablet, 1 tablet, Oral, Daily, Nikolas Gray MD, 1 tablet at 12/04/21 0803  •  norepinephrine (LEVOPHED) 8 mg in 250 mL NS infusion (premix), 0.02-0.3 mcg/kg/min, Intravenous, Titrated, Aravind Landon MD, Last Rate: 6.73 mL/hr at 12/04/21 0930, 0.04 mcg/kg/min at 12/04/21 0930  •   "ondansetron (ZOFRAN) tablet 4 mg, 4 mg, Oral, Q6H PRN **OR** ondansetron (ZOFRAN) injection 4 mg, 4 mg, Intravenous, Q6H PRN, Nikolas Gray MD  •  polyethylene glycol (MIRALAX) packet 17 g, 17 g, Oral, Daily, Aravind Landon MD, 17 g at 21 0806  •  propofol (DIPRIVAN) infusion 10 mg/mL 100 mL, 5-75 mcg/kg/min, Intravenous, Titrated, Aravind Landon MD, Last Rate: 37.7 mL/hr at 21 1201, 70 mcg/kg/min at 21 1201  •  rocuronium (ZEMURON) 500 mg in sodium chloride 0.9 % 100 mL infusion, 8-12 mcg/kg/min, Intravenous, Titrated, Charles Hamilton MD, Held at 21 0205  •  rocuronium (ZEMURON) injection 22.4 mg, 250 mcg/kg, Intravenous, Once PRN, Charles Hamilton MD  •  rocuronium (ZEMURON) injection 50 mg, 50 mg, Intravenous, Once, Aravind Landon MD  •  sodium chloride 0.45 % infusion, 75 mL/hr, Intravenous, Continuous, Mac Ng MD, Last Rate: 75 mL/hr at 21 0922, 75 mL/hr at 21 0922  •  sodium chloride nasal spray 2 spray, 2 spray, Each Nare, PRN, Kelsey Zuñiga MD  •  sodium zirconium cyclosilicate (LOKELMA) pack 10 g, 10 g, Nasogastric, Daily, Mac Ng MD, 10 g at 21 0800  •  thiamine (VITAMIN B-1) tablet 100 mg, 100 mg, Oral, Daily, Julito Main MD, 100 mg at 21 0803  •  zinc sulfate (ZINCATE) capsule 220 mg, 220 mg, Oral, Daily, Nikolas Gray MD, 220 mg at 21 0804      Review of Systems   Unable to perform ROS: Intubated       Objective     Vital Signs:  Temp (24hrs), Av °F (36.1 °C), Min:96.7 °F (35.9 °C), Max:97.2 °F (36.2 °C)      BP (!) 85/64   Pulse 97   Temp 96.7 °F (35.9 °C) (Axillary)   Resp (!) 34   Ht 172.7 cm (68\")   Wt 85.8 kg (189 lb 3.2 oz)   SpO2 (!) 76%   BMI 28.77 kg/m²       Physical Exam  General: Patient was lying in bed in no acute distress.    HEENT: ET tube in place.  Noted purple foam underneath head  Respiratory: Appears synchronous with the vent .  Neuro: Sedated on vent   Psych: Sedated on " vent      Results Review:    I reviewed the patient's new clinical results.    Lab Results:  CBC:   Lab Results   Lab 11/28/21  0616 11/29/21  0554 11/30/21 0337 12/01/21 0349 12/02/21 0319 12/03/21 0522 12/04/21  0608   WBC 21.30* 17.44* 17.66* 13.77* 18.61* 16.07* 18.45*   HEMOGLOBIN 12.1* 11.3* 10.8* 10.0* 10.1* 9.5* 9.2*   HEMATOCRIT 38.3 36.4* 35.6* 33.1* 33.3* 31.0* 30.6*   PLATELETS 181 183 207 168 158 130* 132*   LYMPHOCYTE %  --   --   --   --   --   --  7.4*   MONOCYTES %  --   --   --   --   --   --  1.1*     >>>>>>>>> manual differential with 88% neutrophils/7% lymphocytes/1% monocytes/2% eosinophils    CMP:   Lab Results   Lab 12/02/21 0319 12/03/21  0000 12/03/21 0522 12/03/21 2033 12/04/21  0608   SODIUM 141   < > 138  136 135* 132*   POTASSIUM 5.8*   < > 5.7*  5.6* 5.4* 5.6*   CHLORIDE 104   < > 101  100 98 98   CO2 31.0*   < > 28.0  29.0 25.0 24.0   BUN 89*   < > 93*  97* 98* 108*   CREATININE 1.22   < > 1.26  1.21 1.29* 1.36*   CALCIUM 7.6*   < > 7.7*  7.8* 7.5* 7.7*   BILIRUBIN 0.5  --  0.5  --  0.6   ALK PHOS 63  --  54  --  55   ALT (SGPT) 37  --  50*  --  60*   AST (SGOT) 40  --  34  --  38   GLUCOSE 226*   < > 190*  189* 177* 186*    < > = values in this interval not displayed.       COVID LABS:  Results From Last 14 Days   Lab Units 12/04/21  0608 12/03/21  0522 12/02/21 0319 12/01/21 0349 12/01/21 0349 11/30/21  0337 11/29/21  0554 11/24/21  0319 11/23/21  0307 11/21/21 0327 11/21/21 0327   CRP mg/dL 1.50* 2.04* 1.65*   < > 1.47*   < > 5.53*   < > 0.68*   < > 1.62*   FERRITIN ng/mL  --   --   --   --   --   --   --   --   --   --  1,115.00*   PROCALCITONIN ng/mL  --  0.22  --   --  0.13  --  0.27*   < > 0.09   < > 0.11   PROTIME Seconds  --   --   --   --   --   --   --   --   --   --  14.8*   INR   --   --   --   --   --   --   --   --   --   --  1.21*   TRIGLYCERIDES mg/dL  --   --   --   --   --   --   --   --  324*  --   --     < > = values in this interval not  displayed.         Estimated Creatinine Clearance: 54.7 mL/min (A) (by C-G formula based on SCr of 1.36 mg/dL (H)).    Culture Results:    Microbiology Results (last 10 days)     Procedure Component Value - Date/Time    Respiratory Culture - Sputum, ET Suction [[id number]]  (Abnormal)  (Susceptibility) Collected: 11/25/21 1045    Lab Status: Edited Result - FINAL Specimen: Sputum from ET Suction Updated: 11/28/21 0918     Respiratory Culture Light growth (2+) Pseudomonas aeruginosa      Light growth (2+) Klebsiella pneumoniae ssp pneumoniae      No Normal Respiratory Abdirahman     Gram Stain Greater than 25 WBCs per low power field      No Epithelial cells per low power field      Many (4+) Gram negative bacilli      Few (2+) Mixed gram positive abdirahman    Susceptibility      Pseudomonas aeruginosa     KG     Cefazolin Resistant (C)  [1]      Ceftazidime Susceptible     Ciprofloxacin Susceptible     Gentamicin Susceptible     Imipenem Susceptible (C)  [1]      Levofloxacin Susceptible     Meropenem Susceptible (C)  [1]      Piperacillin + Tazobactam Susceptible                 [1]  Appended report. These results have been appended to a previously final verified report.             Susceptibility      Klebsiella pneumoniae ssp pneumoniae     KG     Ampicillin Resistant     Ampicillin + Sulbactam Intermediate     Cefepime Susceptible     Ceftazidime Susceptible     Ceftriaxone Susceptible     Gentamicin Susceptible     Imipenem Susceptible (C)  [1]      Levofloxacin Intermediate     Piperacillin + Tazobactam Susceptible     Tetracycline Resistant     Trimethoprim + Sulfamethoxazole Susceptible                 [1]  Appended report. These results have been appended to a previously final verified report.             Susceptibility Comments     Pseudomonas aeruginosa    The previously reported component CEFEPIME is no longer being reported. Previous result was Susceptible (<=1 ug/ml) (Reference Range: [Reference Range]) on  11/28/2021 at 0827 CST.    Klebsiella pneumoniae ssp pneumoniae    Cefazolin sensitivity will not be reported for Enterobacteriaceae in non-urine isolates. If cefazolin is preferred, please call the microbiology lab to request an E-test.  With the exception of urinary-sourced infections, aminoglycosides should not be used as monotherapy.                        Radiology:               Imaging Results (Last 72 Hours)     ** No results found for the last 72 hours. **                    Assessment/Plan     Active Hospital Problems    Diagnosis    • **Pneumonia due to COVID-19 virus    • Acute respiratory failure with hypoxia (HCC)    • History of pulmonary embolism    • Hyperglycemia    • Essential hypertension    Acute respiratory failure secondary to COVID-19 infection patient had been on max BiPAP setting for several days.  Intubated November 24.  On FiO2 of 100%.  Possible superimposed bacterial pneumonia with  Pseudomonas aeruginosa and Klebsiella.  On Zosyn  Acute kidney injury-essentially resolved nephrology following  Markedly elevated CRP on admission.  Significantly improved however trending back up slightly  Elevated procalcitonin-resolved  Diabetes mellitus per hemoglobin A1c of 6.7  New bilateral DVT in patient with history of DVT and PE in 2016.  Leukocytosis -  Thrombocytopenia-new problem - stable        RECOMMENDATION:   · Continue zosyn.  Increased dose to 4.5 g for Pseudomonas dosing given improved renal function.( Day #10 of abx -was on meropenem first)  · Continue dexamethasone-increased to twice daily November 24  · Wean norepinephrine as tolerated  · Vent management per pulmonary  ·  Adjuvant therapy per hospitalist  · Monitor white blood count  · Stat pCXR      Discussed with THERESA Hatfield MD  12/04/21  13:10 CST

## 2021-12-05 NOTE — PLAN OF CARE
Goal Outcome Evaluation:              Outcome Summary: Maximum vent settings with O2 sat declining throughout the day. Levophed restarted and titrated up to .24. Vasopressin started. Fentanyl and propofol infusing for sedation. Afebrile. Chest X-ray obtained. Continue to monitor.

## 2021-12-05 NOTE — PROGRESS NOTES
[facility] Services  INPATIENT PROGRESS NOTE    Patient Name: Carlton Ritter  Date of Admission: 11/15/2021  Today's Date: 12/05/21  Length of Stay: 20  Primary Care Physician: Jose Luciano MD    Subjective   Chief Complaint: SOA    Intubated/Sedated  Oxygen sats dropped to low-mid 60's for much of the day yesterday.  Family ultimately decided to make him DNR.  Nursing suctioned him this AM.  He had what appears to be tube feeding removed.  His oxygen sats have improved to 85%.      Unfortunately he is now in mult-system organ failure.  AST and ALT are both >7,000.  Bilirubin is 1.7.      Creatinine is worse today at 2.5 with worsening hyperkalemia.  Potassium is 6.3.        Review of Systems   Unable to perform ROS: Intubated          All pertinent negatives and positives are as above. All other systems have been reviewed and are negative unless otherwise stated.     Objective    Temp:  [94.9 °F (34.9 °C)-97.1 °F (36.2 °C)] 94.9 °F (34.9 °C)  Heart Rate:  [] 86  Resp:  [32-34] 32  BP: ()/(23-99) 105/66  Arterial Line BP: ()/(53-69) 96/69  FiO2 (%):  [100 %] 100 %  Physical Exam  Vitals reviewed.   Constitutional:       Appearance: He is well-developed.      Interventions: He is sedated and intubated.   HENT:      Head: Normocephalic and atraumatic.      Right Ear: External ear normal.      Left Ear: External ear normal.      Nose: Nose normal.   Eyes:      General: No scleral icterus.        Right eye: No discharge.         Left eye: No discharge.      Conjunctiva/sclera: Conjunctivae normal.      Pupils: Pupils are equal, round, and reactive to light.   Neck:      Thyroid: No thyromegaly.      Trachea: No tracheal deviation.   Cardiovascular:      Rate and Rhythm: Normal rate and regular rhythm.      Heart sounds: Normal heart sounds. No murmur heard.  No friction rub. No gallop.    Pulmonary:      Effort: Pulmonary effort is normal. No  respiratory distress. He is intubated.      Breath sounds: No stridor. Decreased breath sounds and rhonchi present. No wheezing or rales.   Chest:      Chest wall: No tenderness.   Abdominal:      General: Bowel sounds are normal. There is no distension.      Palpations: Abdomen is soft. There is no mass.      Tenderness: There is no abdominal tenderness. There is no guarding or rebound.      Hernia: No hernia is present.   Musculoskeletal:         General: No deformity. Normal range of motion.      Cervical back: Normal range of motion and neck supple.   Lymphadenopathy:      Cervical: No cervical adenopathy.   Skin:     General: Skin is warm and dry.      Coloration: Skin is not pale.      Findings: No erythema or rash.   Neurological:      Mental Status: He is unresponsive.      Cranial Nerves: No cranial nerve deficit.      Motor: No abnormal muscle tone.      Coordination: Coordination normal.      Deep Tendon Reflexes: Reflexes are normal and symmetric. Reflexes normal.           Results Review:  I have reviewed the labs, radiology results, and diagnostic studies.    Laboratory Data:   Results from last 7 days   Lab Units 12/05/21  0641 12/04/21  0608 12/03/21  0522   WBC 10*3/mm3 38.35* 18.45* 16.07*   HEMOGLOBIN g/dL 9.9* 9.2* 9.5*   HEMATOCRIT % 33.6* 30.6* 31.0*   PLATELETS 10*3/mm3 128* 132* 130*        Results from last 7 days   Lab Units 12/05/21  0641 12/05/21  0003 12/04/21 1953 12/04/21  0608 12/04/21  0608 12/03/21 2033 12/03/21  0522   SODIUM mmol/L 127*  --  125*  --  132*   < > 138  136   POTASSIUM mmol/L 6.3* 6.9* 6.7*   < > 5.6*   < > 5.7*  5.6*   CHLORIDE mmol/L 92*  --  91*  --  98   < > 101  100   CO2 mmol/L 16.0*  --  14.0*  --  24.0   < > 28.0  29.0   BUN mg/dL 109*  --  112*  --  108*   < > 93*  97*   CREATININE mg/dL 2.25*  --  1.82*  --  1.36*   < > 1.26  1.21   CALCIUM mg/dL 7.0*  --  7.2*  --  7.7*   < > 7.7*  7.8*   BILIRUBIN mg/dL 1.7*  --   --   --  0.6  --  0.5   ALK PHOS  U/L 60  --   --   --  55  --  54   ALT (SGPT) U/L >7,000*  --   --   --  60*  --  50*   AST (SGOT) U/L >7,000*  --   --   --  38  --  34   GLUCOSE mg/dL 208*  --  203*  --  186*   < > 190*  189*    < > = values in this interval not displayed.       Culture Data:   No results found for: BLOODCX, URINECX, WOUNDCX, MRSACX, RESPCX, STOOLCX    Radiology Data:   Imaging Results (Last 24 Hours)     Procedure Component Value Units Date/Time    XR Chest 1 View [[id number]] Collected: 12/04/21 1507     Updated: 12/04/21 1512    Narrative:      EXAM: XR CHEST 1 VW- 12/4/2021 2:59 PM CST     HISTORY: worsenig oxygenation; U07.1-COVID-19; J12.82-Pneumonia due to  coronavirus disease 2019; R09.02-Hypoxemia; I10-Essential (primary)  hypertension; J96.01-Acute respiratory failure with hypoxia;  Z86.711-Personal history of pulmonary embolism; R73.9-Hyperglycemia,  unspecified; I26.02-Saddle embolus of pulmonary artery with acute cor  pulmonale; E78.5-Hyperlipidemia, unspecified; R06.00-Dyspnea, unspeci       COMPARISON: November 28, 2021.     TECHNIQUE: Frontal radiograph of the chest     FINDINGS:   Bilateral parenchymal infiltrates are present. This is extensive  pneumonia. There is no improvement from November 28, 2021.. Cardiac  silhouettes moderately enlarged. Endotracheal tube and nasogastric tube  are stable in position..      The osseous structures and surrounding soft tissues demonstrate no acute  abnormality.          Impression:      1. Extensive bilateral airspace filling infiltrates consistent with  pneumonia. There is no improvement from November 28, 2021.        This report was finalized on 12/04/2021 15:09 by Dr. Tobias Freedman MD.          I have reviewed the patient's current medications.     Labs reviewed: leukocytosis, hyperkalemia    Telemetry reviewed    Telemetry independently interpreted by me:  NSR      Assessment/Plan     Active Hospital Problems    Diagnosis    • **Pneumonia due to COVID-19 virus    •  Acute respiratory failure with hypoxia (HCC)    • History of pulmonary embolism    • Hyperglycemia    • Essential hypertension      1.  Acute Hypoxic respiratory failure  -Decadron  -Pulm consulted and following  -Tocilzumab given    2.  COVID-19 pneumonia  -Decadron  -Pulm consulted and following  -Tocilzumab given  -ID consulted and following    3.  HTN  -monitor    4.  History of PE  -Lovenox    5.  HLD  -Statin    6.  DVT  -Lovenox    7.  Superimposed bacterial pneumonia due to Pseudomonas Aeruginosa and Klebsiella  -IV Abx  -ID following    8.  Hyperkalemia  -IV calcium  -Insulin/Dextrose  -Lokelema   -Nephrology following; may need HD, not sure sure he's stable enough to tolerate it.  Will defer to Nephrology    Spoke with the patient's wife and daughter at bedside yesterday.        Discharge Planning: continue in CCU, poor overall prognosis    Electronically signed by Julito Main MD, 12/05/21, 10:45 CST.

## 2021-12-05 NOTE — PROGRESS NOTES
INFECTIOUS DISEASES PROGRESS NOTE    Patient:  Carlton Ritter  YOB: 1950  MRN: [id number]   Admit date: 11/15/2021   Admitting Physician: Julito Main MD  Primary Care Physician: Jose Luciano MD      Chief Complaint: Unobtainable from patient on ventilator      Interval History: Family was able to visit yesterday as patient was taken off isolation.  Decision was made for no CPR.    Patient's norepinephrine requirements increased yesterday and he subsequently had a second pressor added.    Urine output decline, creatinine increased and transaminase levels greater than 7000.    Patient now has Eric hugger on due to hypothermia.  O2 sats yesterday were in the 60s and 70s much of the day.  They have improved somewhat today but are still in the 80s    Allergies: No Known Allergies    Current Meds:     Current Facility-Administered Medications:   •  acetaminophen (TYLENOL) tablet 650 mg, 650 mg, Oral, Q4H PRN, 650 mg at 11/24/21 0806 **OR** acetaminophen (TYLENOL) suppository 650 mg, 650 mg, Rectal, Q4H PRN, Nikolas Gray MD  •  acetaminophen (TYLENOL) tablet 650 mg, 650 mg, Oral, Q4H PRN **OR** acetaminophen (TYLENOL) suppository 650 mg, 650 mg, Rectal, Q4H PRN, Nikolas Gray MD  •  albuterol sulfate HFA (PROVENTIL HFA;VENTOLIN HFA;PROAIR HFA) inhaler 2 puff, 2 puff, Inhalation, Q4H - RT, Nikolas Gray MD, 2 puff at 12/05/21 0654  •  artificial tears ophthalmic ointment, , Both Eyes, Q4H, Charles Hamilton MD, Given at 12/05/21 0711  •  ascorbic acid (VITAMIN C) tablet 500 mg, 500 mg, Oral, Daily, Nikolas Gray MD, 500 mg at 12/05/21 0818  •  atorvastatin (LIPITOR) tablet 10 mg, 10 mg, Oral, Nightly, Nikolas Gray MD, 10 mg at 12/04/21 2007  •  Beneprotein packet 3 packet, 3 packet, Per G Tube, TID, Julito Main MD, 3 packet at 12/05/21 0821  •  benzonatate (TESSALON) capsule 200 mg, 200 mg, Oral, Q4H PRN, Nikolas Gray MD, 200 mg at 11/24/21 0807  •   budesonide-formoterol (SYMBICORT) 160-4.5 MCG/ACT inhaler 2 puff, 2 puff, Inhalation, BID - RT, Aliya Fam, APRN, 2 puff at 12/05/21 0654  •  cholecalciferol (VITAMIN D3) tablet 1,000 Units, 1,000 Units, Oral, Daily, Nikolas Gray MD, 1,000 Units at 12/05/21 0817  •  dexamethasone (DECADRON) tablet 6 mg, 6 mg, Oral, Daily, 6 mg at 12/05/21 0816 **OR** dexamethasone (DECADRON) injection 6 mg, 6 mg, Intravenous, Daily, Reji Bhagat MD  •  dextromethorphan polistirex ER (DELSYM) 30 MG/5ML oral suspension 60 mg, 60 mg, Oral, Q12H PRN, Nikolas Gray MD, 60 mg at 11/23/21 2011  •  dextrose (D50W) (25 g/50 mL) IV injection 25 g, 25 g, Intravenous, Q15 Min PRN, Nikolas Gray MD  •  dextrose (D50W) (25 g/50 mL) IV injection 50 mL, 50 mL, Intravenous, Q1H PRN, Aravind Landon MD, 50 mL at 11/26/21 1235  •  dextrose (GLUTOSE) oral gel 15 g, 15 g, Oral, Q15 Min PRN, Nikolas Gray MD  •  enoxaparin (LOVENOX) syringe 80 mg, 1 mg/kg, Subcutaneous, Q12H, Julito Main MD, 80 mg at 12/05/21 0821  •  famotidine (PEPCID) tablet 20 mg, 20 mg, Oral, Daily, Aravind Landon MD, 20 mg at 12/05/21 0817  •  fenofibrate (TRICOR) tablet 48 mg, 48 mg, Oral, Daily, Aravind Landon MD, 48 mg at 12/05/21 0818  •  fentaNYL 2500 mcg/250 mL NS infusion,  mcg/hr, Intravenous, Titrated, Julito Main MD, Last Rate: 20 mL/hr at 12/05/21 0656, 200 mcg/hr at 12/05/21 0656  •  glucagon (human recombinant) (GLUCAGEN DIAGNOSTIC) injection 1 mg, 1 mg, Subcutaneous, Q15 Min PRN, Nikolas Gray MD  •  guaiFENesin (ROBITUSSIN) 100 MG/5ML oral solution 200 mg, 200 mg, Oral, TID, Aliya Fam APRN, 200 mg at 12/05/21 0820  •  insulin lispro (humaLOG) injection 0-9 Units, 0-9 Units, Subcutaneous, Q6H, Aravind Landon MD, 4 Units at 12/05/21 0655  •  melatonin tablet 3 mg, 3 mg, Oral, Nightly, Nikolas Gray MD, 3 mg at 12/04/21 2003  •  multivitamin with minerals 1 tablet, 1 tablet, Oral, Daily,  Nikolas Gray MD, 1 tablet at 12/04/21 0803  •  norepinephrine (LEVOPHED) 8 mg in 250 mL NS infusion (premix), 0.02-0.3 mcg/kg/min, Intravenous, Titrated, Aravind Landon MD, Last Rate: 50.5 mL/hr at 12/05/21 0439, 0.3 mcg/kg/min at 12/05/21 0439  •  ondansetron (ZOFRAN) tablet 4 mg, 4 mg, Oral, Q6H PRN **OR** ondansetron (ZOFRAN) injection 4 mg, 4 mg, Intravenous, Q6H PRN, Nikolas Gray MD, 4 mg at 12/04/21 2041  •  phenylephrine (AMY-SYNEPHRINE) 50 mg in sodium chloride 0.9 % 250 mL infusion, 0.5-3 mcg/kg/min, Intravenous, Titrated, Hernandez Marie DO  •  piperacillin-tazobactam (ZOSYN) 4.5 g in iso-osmotic dextrose 100 mL IVPB (premix), 4.5 g, Intravenous, Q6H, Kelsey Zuñiga MD, 4.5 g at 12/05/21 0626  •  polyethylene glycol (MIRALAX) packet 17 g, 17 g, Oral, Daily, Aravind Landon MD, 17 g at 12/01/21 0806  •  propofol (DIPRIVAN) infusion 10 mg/mL 100 mL, 5-75 mcg/kg/min, Intravenous, Titrated, Aravind Landon MD, Last Rate: 37.7 mL/hr at 12/04/21 1908, 70 mcg/kg/min at 12/04/21 1908  •  rocuronium (ZEMURON) 500 mg in sodium chloride 0.9 % 100 mL infusion, 8-12 mcg/kg/min, Intravenous, Titrated, Charles Hamilton MD, Held at 11/29/21 0205  •  rocuronium (ZEMURON) injection 22.4 mg, 250 mcg/kg, Intravenous, Once PRN, Charles Hamilton MD  •  sodium chloride 0.45 % infusion, 75 mL/hr, Intravenous, Continuous, Mac Ng MD, Last Rate: 75 mL/hr at 12/04/21 0922, 75 mL/hr at 12/04/21 0922  •  sodium chloride nasal spray 2 spray, 2 spray, Each Nare, PRN, Kelsey Zuñiga MD  •  sodium zirconium cyclosilicate (LOKELMA) pack 10 g, 10 g, Nasogastric, Daily, Mac Ng MD, 10 g at 12/05/21 0819  •  sodium zirconium cyclosilicate (LOKELMA) pack 10 g, 10 g, Nasogastric, Once, Hernandez Marie DO  •  thiamine (VITAMIN B-1) tablet 100 mg, 100 mg, Oral, Daily, Julito Main MD, 100 mg at 12/05/21 0818  •  Vasopressin (PITRESSIN) 20 Units in sodium chloride 0.9 % 100 mL infusion, 0.03  "Units/min, Intravenous, Continuous, Julito Main MD, Last Rate: 9 mL/hr at 21 034, 0.03 Units/min at 21 034  •  zinc sulfate (ZINCATE) capsule 220 mg, 220 mg, Oral, Daily, Nikolas Gray MD, 220 mg at 21 0816      Review of Systems   Unable to perform ROS: Intubated       Objective     Vital Signs:  Temp (24hrs), Av.8 °F (36 °C), Min:96.5 °F (35.8 °C), Max:97.1 °F (36.2 °C)      /81   Pulse 77   Temp 96.5 °F (35.8 °C) (Axillary)   Resp (!) 32   Ht 172.7 cm (68\")   Wt 86.4 kg (190 lb 7.6 oz)   SpO2 (!) 85%   BMI 28.96 kg/m²       Physical Exami  General: Patient was lying in bed turned towards his left side in no acute distress  HEENT: ET tube in place.    Respiratory: Appears synchronous with the vent .  Neuro: Sedated on vent   Psych: Sedated on vent      Results Review:    I reviewed the patient's new clinical results.    Lab Results:  CBC:   Lab Results   Lab 21  0554 217 21  0608 21  0641   WBC 17.44* 17.66* 13.77* 18.61* 16.07* 18.45* 38.35*   HEMOGLOBIN 11.3* 10.8* 10.0* 10.1* 9.5* 9.2* 9.9*   HEMATOCRIT 36.4* 35.6* 33.1* 33.3* 31.0* 30.6* 33.6*   PLATELETS 183 207 168 158 130* 132* 128*   LYMPHOCYTE %  --   --   --   --   --  7.4* 1.0*   MONOCYTES %  --   --   --   --   --  1.1* 3.0*   >>>>>>>>>>>>>> bands 11%    CMP:   Lab Results   Lab 12/03/21  0522 12/03/21  21  0003 2141   SODIUM 138  136   < > 132*  --  125*  --  127*   POTASSIUM 5.7*  5.6*   < > 5.6*   < > 6.7* 6.9* 6.3*   CHLORIDE 101  100   < > 98  --  91*  --  92*   CO2 28.0  29.0   < > 24.0  --  14.0*  --  16.0*   BUN 93*  97*   < > 108*  --  112*  --  109*   CREATININE 1.26  1.21   < > 1.36*  --  1.82*  --  2.25*   CALCIUM 7.7*  7.8*   < > 7.7*  --  7.2*  --  7.0*   BILIRUBIN 0.5  --  0.6  --   --   --  1.7*   ALK PHOS 54  --  55  --   --   --  60   ALT " (SGPT) 50*  --  60*  --   --   --  >7,000*   AST (SGOT) 34  --  38  --   --   --  >7,000*   GLUCOSE 190*  189*   < > 186*  --  203*  --  208*    < > = values in this interval not displayed.       COVID LABS:  Results From Last 14 Days   Lab Units 12/05/21  0641 12/04/21  0608 12/03/21  0522 12/02/21  0319 12/01/21  0349 11/24/21  0319 11/23/21  0307   CRP mg/dL 2.05* 1.50* 2.04*   < > 1.47*   < > 0.68*   PROCALCITONIN ng/mL 1.05*  --  0.22  --  0.13   < > 0.09   TRIGLYCERIDES mg/dL  --   --   --   --   --   --  324*    < > = values in this interval not displayed.         Estimated Creatinine Clearance: 33.1 mL/min (A) (by C-G formula based on SCr of 2.25 mg/dL (H)).    Culture Results:    Microbiology Results (last 10 days)     Procedure Component Value - Date/Time    Respiratory Culture - Sputum, ET Suction [[id number]]  (Abnormal)  (Susceptibility) Collected: 11/25/21 1045    Lab Status: Edited Result - FINAL Specimen: Sputum from ET Suction Updated: 11/28/21 0924     Respiratory Culture Light growth (2+) Pseudomonas aeruginosa      Light growth (2+) Klebsiella pneumoniae ssp pneumoniae      No Normal Respiratory Abdirahman     Gram Stain Greater than 25 WBCs per low power field      No Epithelial cells per low power field      Many (4+) Gram negative bacilli      Few (2+) Mixed gram positive abdirahman    Susceptibility      Pseudomonas aeruginosa     KG     Cefazolin Resistant (C)  [1]      Ceftazidime Susceptible     Ciprofloxacin Susceptible     Gentamicin Susceptible     Imipenem Susceptible (C)  [1]      Levofloxacin Susceptible     Meropenem Susceptible (C)  [1]      Piperacillin + Tazobactam Susceptible                 [1]  Appended report. These results have been appended to a previously final verified report.             Susceptibility      Klebsiella pneumoniae ssp pneumoniae     KG     Ampicillin Resistant     Ampicillin + Sulbactam Intermediate     Cefepime Susceptible     Ceftazidime Susceptible      Ceftriaxone Susceptible     Gentamicin Susceptible     Imipenem Susceptible (C)  [1]      Levofloxacin Intermediate     Piperacillin + Tazobactam Susceptible     Tetracycline Resistant     Trimethoprim + Sulfamethoxazole Susceptible                 [1]  Appended report. These results have been appended to a previously final verified report.             Susceptibility Comments     Pseudomonas aeruginosa    The previously reported component CEFEPIME is no longer being reported. Previous result was Susceptible (<=1 ug/ml) (Reference Range: [Reference Range]) on 11/28/2021 at 0827 CST.    Klebsiella pneumoniae ssp pneumoniae    Cefazolin sensitivity will not be reported for Enterobacteriaceae in non-urine isolates. If cefazolin is preferred, please call the microbiology lab to request an E-test.  With the exception of urinary-sourced infections, aminoglycosides should not be used as monotherapy.                        Radiology:     12/4 11/28      Imaging Results (Last 72 Hours)     Procedure Component Value Units Date/Time    XR Chest 1 View [[id number]] Collected: 12/04/21 1507     Updated: 12/04/21 1512    Narrative:      EXAM: XR CHEST 1 VW- 12/4/2021 2:59 PM CST     HISTORY: worsenig oxygenation; U07.1-COVID-19; J12.82-Pneumonia due to  coronavirus disease 2019; R09.02-Hypoxemia; I10-Essential (primary)  hypertension; J96.01-Acute respiratory failure with hypoxia;  Z86.711-Personal history of pulmonary embolism; R73.9-Hyperglycemia,  unspecified; I26.02-Saddle embolus of pulmonary artery with acute cor  pulmonale; E78.5-Hyperlipidemia, unspecified; R06.00-Dyspnea, unspeci       COMPARISON: November 28, 2021.     TECHNIQUE: Frontal radiograph of the chest     FINDINGS:   Bilateral parenchymal infiltrates are present. This is extensive  pneumonia. There is no improvement from November 28, 2021.. Cardiac  silhouettes moderately enlarged. Endotracheal tube and nasogastric tube  are stable in position..       The osseous structures and surrounding soft tissues demonstrate no acute  abnormality.          Impression:      1. Extensive bilateral airspace filling infiltrates consistent with  pneumonia. There is no improvement from November 28, 2021.        This report was finalized on 12/04/2021 15:09 by Dr. Tobias Freedman MD.                    Assessment/Plan     Active Hospital Problems    Diagnosis    • **Pneumonia due to COVID-19 virus    • Acute respiratory failure with hypoxia (HCC)    • History of pulmonary embolism    • Hyperglycemia    • Essential hypertension    Acute respiratory failure secondary to COVID-19 infection patient had been on max BiPAP setting for several days.  Intubated November 24.  Remains on FiO2 of 100%.  O2 sats are improved when compared to yesterday however still in the mid 80s  Significant decompensation since yesterday requiring 2 pressors and with evidence of shock liver and renal failure likely related to hypotension as well as prolonged hypoxemia.  Patient now hypothermic requiring Eric hugger.  Patient in multiorgan failure-pulmonary, liver, renal, circulatory  Superimposed bacterial pneumonia with  Pseudomonas aeruginosa and Klebsiella.  On Zosyn  Acute kidney injury-essentially resolved nephrology following  Diabetes mellitus per hemoglobin A1c of 6.7  New bilateral DVT in patient with history of DVT and PE in 2016.  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  At this point, my understanding is there not ready for comfort measures so we are continuing full support.  For this reason I will empirically change antimicrobial coverage.        Discussed with THERESA Galicia  Discussed with Dr. Bhagat  Discussed with Dr. Jose David Zuñiga,  MD  12/05/21  09:28 CST

## 2021-12-05 NOTE — PROGRESS NOTES
Nephrology ([facility]) Progress Note      Patient:  Carlton Ritter  YOB: 1950  Date of Service: 12/5/2021  MRN: [id number]   Acct: [id number]   Primary Care Physician: Jose Luciano MD  Advance Directive:   Code Status and Medical Interventions:   Ordered at: 12/05/21 1027     Code Status (Patient has no pulse and is not breathing):    CPR (Attempt to Resuscitate)     Medical Interventions (Patient has pulse or is breathing):    Full Support     Admit Date: 11/15/2021       Hospital Day: 20  Referring Provider: No Known Provider      Patient personally seen and examined.  Complete chart including Consults, Notes, Operative Reports, Labs, Cardiology, and Radiology studies reviewed as able.    Chief complaint: Abnormal labs.    Subjective:  Carlton Ritter is a 71 y.o. male for whom we were consulted for evaluation and treatment of acute kidney injury and hyperkalemia.  Admitted on 11/15 with COVID-19 pneumonia. Required intubation on 11/24 due to worsening hypoxia.  Levophed also started on 11/24. Starting on 11/24 creatinine and potassium levels started climbing. Patient started on TID Lokelma on 11/25.  Remained persistently hyperkalemic, but creatinine and potassium levels have been improving the last few days  Patient remains intubated and sedated.  He is still on very low-dose of Levophed/0.2 mcg.  He has responded to IV fluid and has significant improvement of renal function.  However he remains on 100% FiO2 with a PEEP of 14.  December 4, patient condition deteriorated.  He has worsening of hemodynamics due to severe hypoxemia now requiring maximum dose of 2 pressors.  He remains anuric and his renal function worsened overnight.    This afternoon he has severe hypothermia .  He has worsening of oxygenation saturation in mid 80s.  He is currently on high percent FiO2 and PEEP of 14..      Allergies:  Patient has no known allergies.    Home Meds:  Medications Prior  to Admission   Medication Sig Dispense Refill Last Dose   • apixaban (ELIQUIS) 5 MG tablet tablet Take 1 tablet by mouth Every 12 (Twelve) Hours. 60 tablet 2 Past Week at Unknown time   • ezetimibe (ZETIA) 10 MG tablet Take 10 mg by mouth Daily.   Past Week at Unknown time   • olmesartan (BENICAR) 20 MG tablet Take 40 mg by mouth Daily.   Past Week at Unknown time       Medicines:  Current Facility-Administered Medications   Medication Dose Route Frequency Provider Last Rate Last Admin   • acetaminophen (TYLENOL) tablet 650 mg  650 mg Oral Q4H PRN Nikolas Gray MD   650 mg at 11/24/21 0806    Or   • acetaminophen (TYLENOL) suppository 650 mg  650 mg Rectal Q4H PRN Nikolas Gray MD       • acetaminophen (TYLENOL) tablet 650 mg  650 mg Oral Q4H PRN Nikolas Gray MD        Or   • acetaminophen (TYLENOL) suppository 650 mg  650 mg Rectal Q4H PRN Nikolas Gray MD       • albuterol sulfate HFA (PROVENTIL HFA;VENTOLIN HFA;PROAIR HFA) inhaler 2 puff  2 puff Inhalation Q4H - RT Nikolas Gray MD   2 puff at 12/05/21 1027   • artificial tears ophthalmic ointment   Both Eyes Q4H Charles Hamilton MD   Given at 12/05/21 1143   • ascorbic acid (VITAMIN C) tablet 500 mg  500 mg Oral Daily Nikolas Gray MD   500 mg at 12/05/21 0818   • Beneprotein packet 3 packet  3 packet Per G Tube TID Julito Main MD   3 packet at 12/05/21 0821   • benzonatate (TESSALON) capsule 200 mg  200 mg Oral Q4H PRN Nikolas Gray MD   200 mg at 11/24/21 0807   • budesonide-formoterol (SYMBICORT) 160-4.5 MCG/ACT inhaler 2 puff  2 puff Inhalation BID - RT Aliya Fam APRN   2 puff at 12/05/21 0654   • cholecalciferol (VITAMIN D3) tablet 1,000 Units  1,000 Units Oral Daily Nikolas Gray MD   1,000 Units at 12/05/21 0817   • dexamethasone (DECADRON) tablet 6 mg  6 mg Oral Daily Reji Bhagat MD   6 mg at 12/05/21 0816    Or   • dexamethasone (DECADRON) injection 6 mg  6 mg Intravenous Daily Leola  Reji العراقي MD       • dextromethorphan polistirex ER (DELSYM) 30 MG/5ML oral suspension 60 mg  60 mg Oral Q12H PRN Nikolas Gray MD   60 mg at 11/23/21 2011   • dextrose (D50W) (25 g/50 mL) IV injection 25 g  25 g Intravenous Q15 Min PRN Nikolas Gray MD       • dextrose (D50W) (25 g/50 mL) IV injection 50 mL  50 mL Intravenous Q1H PRN Aravind Landon MD   50 mL at 11/26/21 1235   • dextrose (GLUTOSE) oral gel 15 g  15 g Oral Q15 Min PRN Nikolas Gray MD       • [START ON 12/6/2021] enoxaparin (LOVENOX) syringe 80 mg  1 mg/kg Subcutaneous Q24H Julito Main MD       • famotidine (PEPCID) tablet 20 mg  20 mg Oral Daily Aravind Landon MD   20 mg at 12/05/21 0817   • fenofibrate (TRICOR) tablet 48 mg  48 mg Oral Daily Aravind Landon MD   48 mg at 12/05/21 0818   • fentaNYL 2500 mcg/250 mL NS infusion   mcg/hr Intravenous Titrated Julito Main MD 20 mL/hr at 12/05/21 0656 200 mcg/hr at 12/05/21 0656   • fluconazole (DIFLUCAN) IVPB 200 mg  200 mg Intravenous Once Kelsey Zuñiga MD       • glucagon (human recombinant) (GLUCAGEN DIAGNOSTIC) injection 1 mg  1 mg Subcutaneous Q15 Min PRN Nikolas Gray MD       • guaiFENesin (ROBITUSSIN) 100 MG/5ML oral solution 200 mg  200 mg Oral TID Aliya Fam APRN   200 mg at 12/05/21 0820   • insulin lispro (humaLOG) injection 0-9 Units  0-9 Units Subcutaneous Q6H Aravind Landon MD   4 Units at 12/05/21 1150   • melatonin tablet 3 mg  3 mg Oral Nightly Nikolas Gray MD   3 mg at 12/04/21 2003   • meropenem (MERREM) 1 g in sodium chloride 0.9 % 100 mL IVPB-VTB  1 g Intravenous Q12H Kelsey Zuñiga MD       • multivitamin with minerals 1 tablet  1 tablet Oral Daily Nikolas Gray MD   1 tablet at 12/05/21 1048   • norepinephrine (LEVOPHED) 8 mg in 250 mL NS infusion (premix)  0.02-0.3 mcg/kg/min Intravenous Titrated Aravind Landon MD 50.5 mL/hr at 12/05/21 1048 0.3 mcg/kg/min at 12/05/21 1048   • ondansetron  (ZOFRAN) tablet 4 mg  4 mg Oral Q6H PRN Nikolas Gray MD        Or   • ondansetron (ZOFRAN) injection 4 mg  4 mg Intravenous Q6H PRN Nikolas Gray MD   4 mg at 12/04/21 2041   • phenylephrine (AMY-SYNEPHRINE) 50 mg in sodium chloride 0.9 % 250 mL infusion  0.5-3 mcg/kg/min Intravenous Titrated Hernandez Marie DO       • polyethylene glycol (MIRALAX) packet 17 g  17 g Oral Daily Aravind Landon MD   17 g at 12/01/21 0806   • propofol (DIPRIVAN) infusion 10 mg/mL 100 mL  5-75 mcg/kg/min Intravenous Titrated Aravind Landon MD 37.7 mL/hr at 12/04/21 1908 70 mcg/kg/min at 12/04/21 1908   • rocuronium (ZEMURON) 500 mg in sodium chloride 0.9 % 100 mL infusion  8-12 mcg/kg/min Intravenous Titrated Charles Hamilton MD   Held at 11/29/21 0205   • rocuronium (ZEMURON) injection 22.4 mg  250 mcg/kg Intravenous Once PRN Charles Hamilton MD       • sodium chloride 0.45 % infusion  75 mL/hr Intravenous Continuous Mac Ng MD 75 mL/hr at 12/05/21 0930 75 mL/hr at 12/05/21 0930   • sodium chloride nasal spray 2 spray  2 spray Each Nare PRN Kelsey Zuñiga MD       • sodium zirconium cyclosilicate (LOKELMA) pack 10 g  10 g Nasogastric Daily Mac Ng MD   10 g at 12/05/21 0819   • sodium zirconium cyclosilicate (LOKELMA) pack 10 g  10 g Nasogastric Once Hernandez Marie DO       • thiamine (VITAMIN B-1) tablet 100 mg  100 mg Oral Daily Julito Main MD   100 mg at 12/05/21 0818   • Vasopressin (PITRESSIN) 20 Units in sodium chloride 0.9 % 100 mL infusion  0.03 Units/min Intravenous Continuous Julito Main MD 9 mL/hr at 12/05/21 0342 0.03 Units/min at 12/05/21 0342   • zinc sulfate (ZINCATE) capsule 220 mg  220 mg Oral Daily Nikolas Gray MD   220 mg at 12/05/21 0816       Past Medical History:  Past Medical History:   Diagnosis Date   • Acute saddle pulmonary embolism with acute cor pulmonale (HCC)    • Hyperlipidemia    • Hypertension        Past Surgical History:  Past Surgical  History:   Procedure Laterality Date   • CARDIAC CATHETERIZATION N/A 10/21/2016    Procedure: Thrombolytic Therapy;  Surgeon: Andrew Akins MD;  Location:  [facility];  Service:    • EKOS CATHETER REMOVAL Right 10/22/2016    Procedure: Ekos catheter removal with stent placement;  Surgeon: Percy Chavarria MD;  Location:  [facility];  Service:        Family History  Family History   Problem Relation Age of Onset   • Heart disease Mother    • Hypertension Mother    • Heart disease Father    • Hypertension Brother        Social History  Social History     Socioeconomic History   • Marital status:    Tobacco Use   • Smoking status: Never Smoker   • Tobacco comment: Wife is surrogate decision-maker   Substance and Sexual Activity   • Alcohol use: No   • Drug use: No   • Sexual activity: Defer         Review of Systems:  Unable to obtain due to intubated and sedated    Objective:  Patient Vitals for the past 24 hrs:   BP Temp Temp src Pulse Resp SpO2 Weight   12/05/21 1200 114/64 97 °F (36.1 °C) Axillary 84 -- (!) 82 % --   12/05/21 1100 112/67 -- -- 90 -- (!) 82 % --   12/05/21 1027 -- -- -- 86 (!) 32 (!) 85 % --   12/05/21 1000 105/66 -- -- 85 -- (!) 85 % --   12/05/21 0900 122/69 -- -- 84 -- (!) 86 % --   12/05/21 0800 110/57 94.9 °F (34.9 °C) Rectal 76 -- (!) 86 % --   12/05/21 0700 111/81 -- -- 77 -- (!) 85 % --   12/05/21 0654 -- -- -- 77 (!) 32 (!) 85 % --   12/05/21 0600 -- -- -- -- -- -- 86.4 kg (190 lb 7.6 oz)   12/05/21 0500 113/65 -- -- 79 -- (!) 83 % --   12/05/21 0400 113/63 -- -- 82 -- (!) 85 % --   12/05/21 0330 -- 96.5 °F (35.8 °C) Axillary -- -- -- --   12/05/21 0300 113/60 -- -- 84 -- (!) 85 % --   12/05/21 0230 -- -- -- 87 -- (!) 85 % --   12/05/21 0214 -- -- -- 88 -- -- --   12/05/21 0208 -- -- -- 84 (!) 32 (!) 85 % --   12/05/21 0200 102/74 -- -- 88 -- (!) 84 % --   12/05/21 0100 104/73 -- -- 87 -- (!) 80 % --   12/05/21 0000 111/76 -- -- 89 -- (!) 77 % --    12/04/21 2300 102/67 -- -- 90 -- (!) 77 % --   12/04/21 2243 -- -- -- 90 (!) 32 (!) 77 % --   12/04/21 2200 103/62 -- -- 93 -- (!) 77 % --   12/04/21 2130 -- -- -- 92 -- (!) 68 % --   12/04/21 2100 (!) 113/23 -- -- 95 -- (!) 60 % --   12/04/21 2030 -- -- -- 99 -- (!) 64 % --   12/04/21 2000 101/73 -- -- 98 -- -- --   12/04/21 1926 -- -- -- 98 (!) 32 (!) 68 % --   12/04/21 1900 (!) 86/62 -- -- 93 -- (!) 68 % --   12/04/21 1800 97/49 -- -- 94 -- (!) 66 % --   12/04/21 1700 117/77 -- -- 87 -- (!) 68 % --   12/04/21 1615 -- -- -- 92 -- (!) 71 % --   12/04/21 1600 (!) 89/60 97.1 °F (36.2 °C) Axillary 91 -- (!) 73 % --   12/04/21 1545 -- -- -- 89 -- (!) 73 % --   12/04/21 1530 -- -- -- 88 -- (!) 73 % --   12/04/21 1515 104/65 -- -- 87 -- (!) 73 % --   12/04/21 1500 114/99 -- -- 90 -- (!) 72 % --   12/04/21 1450 -- -- -- 89 (!) 34 (!) 71 % --   12/04/21 1445 -- -- -- 87 -- (!) 73 % --   12/04/21 1430 -- -- -- 104 -- (!) 72 % --   12/04/21 1415 -- -- -- 97 -- (!) 74 % --   12/04/21 1400 100/66 -- -- 97 -- (!) 74 % --   12/04/21 1345 -- -- -- 96 -- (!) 75 % --       Intake/Output Summary (Last 24 hours) at 12/5/2021 1335  Last data filed at 12/5/2021 1200  Gross per 24 hour   Intake 3723.1 ml   Output 210 ml   Net 3513.1 ml   In person physical examination was not done as he  has COVID-19 pneumonia.  This was to prevent unnecessary exposure of COVID-19 as well as unnecessary use of PPE.  However patient was seen through the glass door.  Finding of physical exam was discussed with registered nurse.  General: intubated, sedated  HEENT: Normocephalic atraumatic head/orotracheal intubation.  Chest:  equal chest rise  CVS: regular rate and rhythm  Abdominal: soft, nontender, positive bowel sounds  Extremities: no cyanosis or edema  Skin: warm and dry without rash      Labs:  Results from last 7 days   Lab Units 12/05/21  0641 12/04/21  0608 12/03/21  0522   WBC 10*3/mm3 38.35* 18.45* 16.07*   HEMOGLOBIN g/dL 9.9* 9.2* 9.5*    HEMATOCRIT % 33.6* 30.6* 31.0*   PLATELETS 10*3/mm3 128* 132* 130*         Results from last 7 days   Lab Units 12/05/21  0641 12/05/21  0003 12/04/21 1953 12/04/21 0608 12/04/21 0608 12/03/21 2033 12/03/21  0522   SODIUM mmol/L 127*  --  125*  --  132*   < > 138  136   POTASSIUM mmol/L 6.3* 6.9* 6.7*   < > 5.6*   < > 5.7*  5.6*   CHLORIDE mmol/L 92*  --  91*  --  98   < > 101  100   CO2 mmol/L 16.0*  --  14.0*  --  24.0   < > 28.0  29.0   BUN mg/dL 109*  --  112*  --  108*   < > 93*  97*   CREATININE mg/dL 2.25*  --  1.82*  --  1.36*   < > 1.26  1.21   CALCIUM mg/dL 7.0*  --  7.2*  --  7.7*   < > 7.7*  7.8*   BILIRUBIN mg/dL 1.7*  --   --   --  0.6  --  0.5   ALK PHOS U/L 60  --   --   --  55  --  54   ALT (SGPT) U/L >7,000*  --   --   --  60*  --  50*   AST (SGOT) U/L >7,000*  --   --   --  38  --  34   GLUCOSE mg/dL 208*  --  203*  --  186*   < > 190*  189*    < > = values in this interval not displayed.       Radiology:   Imaging Results (Last 72 Hours)     Procedure Component Value Units Date/Time    XR Chest 1 View [[id number]] Collected: 12/04/21 1507     Updated: 12/04/21 1512    Narrative:      EXAM: XR CHEST 1 VW- 12/4/2021 2:59 PM CST     HISTORY: worsenig oxygenation; U07.1-COVID-19; J12.82-Pneumonia due to  coronavirus disease 2019; R09.02-Hypoxemia; I10-Essential (primary)  hypertension; J96.01-Acute respiratory failure with hypoxia;  Z86.711-Personal history of pulmonary embolism; R73.9-Hyperglycemia,  unspecified; I26.02-Saddle embolus of pulmonary artery with acute cor  pulmonale; E78.5-Hyperlipidemia, unspecified; R06.00-Dyspnea, unspeci       COMPARISON: November 28, 2021.     TECHNIQUE: Frontal radiograph of the chest     FINDINGS:   Bilateral parenchymal infiltrates are present. This is extensive  pneumonia. There is no improvement from November 28, 2021.. Cardiac  silhouettes moderately enlarged. Endotracheal tube and nasogastric tube  are stable in position..      The osseous  "structures and surrounding soft tissues demonstrate no acute  abnormality.          Impression:      1. Extensive bilateral airspace filling infiltrates consistent with  pneumonia. There is no improvement from November 28, 2021.        This report was finalized on 12/04/2021 15:09 by Dr. Tobias Freedman MD.          Culture:  Respiratory Culture   Date Value Ref Range Status   11/25/2021 Light growth (2+) Pseudomonas aeruginosa (A)  Final   11/25/2021 (A)  Final    Light growth (2+) Klebsiella pneumoniae ssp pneumoniae   11/25/2021 No Normal Respiratory Michelle (A)  Final         Assessment   1.  Acute kidney injury/worsening.  2.  Acute tubular necrosis.  3.  Recurrent hyperkalemia   4.  Bilateral COVID-19 pneumonia.  5.  Acute respiratory failure/intubated/worsening oxygenation status..  6.  Metabolic acidemia.  7.  Hypernatremia now resolved.  8.  Sepsis/septic shock  9.  Hyponatremia.    Plan:  1.  IV fluid with bicarb  2.  Continue Lokelma.  4.  Overall prognosis looks very poor  5.  Plan was discussed with her daughter \"Felicity\".  I have explained her that he would not be able to tolerate dialysis      Mac Ng MD  12/5/2021  13:35 CST  "

## 2021-12-05 NOTE — PROGRESS NOTES
PULMONARY AND CRITICAL CARE PROGRESS NOTE - [facility]    Patient: Carlton Ritter    1950    MR# [id number]    Acct# [id number]  12/05/21   15:59 CST  Referring Provider: Julito Main MD    Chief Complaint: Mechanically ventilated    Interval history: No significant changes identified overnight.  Remains on the ventilator and sedated.  He has had isolation removed.  He cannot provide any history.  Nursing ports no new events.    Meds:  albuterol sulfate HFA, 2 puff, Inhalation, Q4H - RT  artificial tears, , Both Eyes, Q4H  ascorbic acid, 500 mg, Oral, Daily  Beneprotein, 3 packet, Per G Tube, TID  budesonide-formoterol, 2 puff, Inhalation, BID - RT  cholecalciferol, 1,000 Units, Oral, Daily  dexamethasone, 6 mg, Oral, Daily   Or  dexamethasone, 6 mg, Intravenous, Daily  [START ON 12/6/2021] enoxaparin, 1 mg/kg, Subcutaneous, Q24H  famotidine, 20 mg, Oral, Daily  fenofibrate, 48 mg, Oral, Daily  guaiFENesin, 200 mg, Oral, TID  insulin lispro, 0-9 Units, Subcutaneous, Q6H  melatonin, 3 mg, Oral, Nightly  meropenem, 1 g, Intravenous, Q12H  multivitamin with minerals, 1 tablet, Oral, Daily  polyethylene glycol, 17 g, Oral, Daily  sodium zirconium cyclosilicate, 10 g, Nasogastric, Daily  sodium zirconium cyclosilicate, 10 g, Nasogastric, Once  thiamine, 100 mg, Oral, Daily  zinc sulfate, 220 mg, Oral, Daily      custom IV infusion builder, , Last Rate: 50 mL/hr at 12/05/21 1434  fentanyl 10 mcg/mL,  mcg/hr, Last Rate: 200 mcg/hr (12/05/21 0656)  norepinephrine, 0.02-0.3 mcg/kg/min, Last Rate: 0.3 mcg/kg/min (12/05/21 1341)  phenylephrine (AMY-SYNEPHRINE) 50 mg in 250 mL  infusion, 0.5-3 mcg/kg/min  propofol, 5-75 mcg/kg/min, Last Rate: 70 mcg/kg/min (12/04/21 1908)  rocuronium (ZEMURON) infusion, 8-12 mcg/kg/min, Last Rate: Stopped (11/29/21 0205)  sodium chloride, 75 mL/hr, Last Rate: 75 mL/hr (12/05/21 0930)  vasopressin, 0.03 Units/min, Last Rate: 0.03 Units/min (12/05/21  1410)      Review of Systems:   Cannot obtain due to mechanical ventilated state    Ventilator Settings:        Resp Rate (Set): 32     FiO2 (%): 100 %  PEEP/CPAP (cm H2O): 14 cm H20  Minute Ventilation (L/min) (Obs): 14.9 L/min  Resp Rate (Observed) Vent: 33  I:E Ratio (Set): 1:1.5  I:E Ratio (Obs): 1:1.70  PIP Observed (cm H2O): 44 cm H2O     Physical Exam:  Temp:  [94.9 °F (34.9 °C)-97.1 °F (36.2 °C)] 97 °F (36.1 °C)  Heart Rate:  [76-99] 97  Resp:  [32-34] 34  BP: ()/(23-81) 114/64  Arterial Line BP: ()/(53-69) 91/67  FiO2 (%):  [100 %] 100 %    Intake/Output Summary (Last 24 hours) at 12/5/2021 1559  Last data filed at 12/5/2021 1200  Gross per 24 hour   Intake 3723.1 ml   Output 210 ml   Net 3513.1 ml     SpO2 Percentage    12/05/21 1100 12/05/21 1200 12/05/21 1438   SpO2: (!) 82% (!) 82% (!) 82%      GENERAL/CONSTITUTIONAL:  Pt is on vent. He appears gravely ill  HEENT: atraumatic, normocephalic. Nutrition infusing to OG.  NOSE: normal no discharge  NECK: jugular veins nondistended supple, no adenopathy.  CHEST: Crackles bilaterally.  Good ventilator synchrony.  Plateau pressure is 38.  Minute volume is 14.5 L.  CARDIAC: normal rate  ABDOMEN: deferred  : deferred  EXTREMITIES: Mild edema no clubbing or cyanosis  NEURO: Deeply sedated, mechanically ventilated; no seizures no tremors or fasciculations  SKIN: no jaundice.  Warm and dry    Results from last 7 days   Lab Units 12/05/21  0641 12/04/21  0608 12/03/21  0522   WBC 10*3/mm3 38.35* 18.45* 16.07*   HEMOGLOBIN g/dL 9.9* 9.2* 9.5*   PLATELETS 10*3/mm3 128* 132* 130*     Results from last 7 days   Lab Units 12/05/21  0641 12/05/21  0003 12/04/21 1953 12/04/21  0608 12/04/21  0608 12/03/21 2033 12/03/21  0522   SODIUM mmol/L 127*  --  125*  --  132*   < > 138  136   POTASSIUM mmol/L 6.3* 6.9* 6.7*   < > 5.6*   < > 5.7*  5.6*   BUN mg/dL 109*  --  112*  --  108*   < > 93*  97*   CREATININE mg/dL 2.25*  --  1.82*  --  1.36*   < > 1.26  1.21    CRP mg/dL 2.05*  --   --   --  1.50*  --  2.04*   PROCALCITONIN ng/mL 1.05*  --   --   --   --   --  0.22    < > = values in this interval not displayed.     Results from last 7 days   Lab Units 12/04/21  0301 11/30/21  0805   PH, ARTERIAL pH units 7.256* 7.377   PCO2, ARTERIAL mm Hg 68.0* 60.4*   PO2 ART mm Hg 63.6* 76.4*   FIO2 % 100 100     Respiratory Culture   Date Value Ref Range Status   11/25/2021 Light growth (2+) Pseudomonas species (A)  Preliminary   11/25/2021 Light growth (2+) Gram Negative Bacilli (A)  Preliminary   11/25/2021 No Normal Respiratory Michelle (A)  Preliminary     Recent films:  XR Chest 1 View    Result Date: 12/4/2021  1. Extensive bilateral airspace filling infiltrates consistent with pneumonia. There is no improvement from November 28, 2021.   This report was finalized on 12/04/2021 15:09 by Dr. Tobias Freedman MD.    Films reviewed personally by me.  My interpretation: none to review today     Pulmonary Assessment:  1. Acute respiratory failure with ARDS, due to COVID-19, still very severe  2. Bilateral COVID-19 pneumonia, doubly vaccinated but without booster, last vaccine greater than 6 months ago  3. Deep vein thrombosis  4. Presumed Pseudomonas and Klebsiella pneumonias, treated  5. Steroid-induced hyperglycemia  6. Personal history of remote pulmonary embolism  7. Obesity, BMI 31  8. Acute kidney injury-due to acute tubular necrosis poor dialysis candidate per nephrology    Recommend:   · D#11 ETT.  Continue mechanical ventilation titrate oxygen for saturation 90%.  Remains on maximal ventilation, worsening respiratory acidosis, poor prognosis  · I have reduced dexamethasone to daily  · Continue Lovenox for DVT  · Continue stress ulcer prophylaxis while on vent-Pepcid  · Continue dexamethasone; does not appear improved on twice daily dosing  · ABG/chest x-ray as needed  · Continue Symbicort and albuterol HFA  · Continue Robitussin  · Continue adjuvant therapy zinc, vitamin D,  vitamin C  · Continue antibiotic per ID -zosyn for Pseudomonas, Klebsiella  · Prognosis is extremely poor.  Given this, okay with me to withdraw support if family wishes such.  · Continue fentanyl, propofol, try to minimize doses    Electronically signed by Reji Bhagat MD on 12/5/2021 at 15:59 CST

## 2021-12-06 PROBLEM — K72.90 LIVER FAILURE (HCC): Status: ACTIVE | Noted: 2021-01-01

## 2021-12-06 PROBLEM — N19 RENAL FAILURE: Status: ACTIVE | Noted: 2021-01-01

## 2021-12-06 PROBLEM — R57.9: Status: ACTIVE | Noted: 2021-01-01

## 2021-12-06 NOTE — PROGRESS NOTES
Nephrology ([facility]) Progress Note      Patient:  Carlton Ritter  YOB: 1950  Date of Service: 12/6/2021  MRN: [id number]   Acct: [id number]   Primary Care Physician: Jose Luciano MD  Advance Directive:   Code Status and Medical Interventions:   Ordered at: 12/05/21 1027     Code Status (Patient has no pulse and is not breathing):    CPR (Attempt to Resuscitate)     Medical Interventions (Patient has pulse or is breathing):    Full Support     Admit Date: 11/15/2021       Hospital Day: 21  Referring Provider: No Known Provider      Patient personally seen and examined.  Complete chart including Consults, Notes, Operative Reports, Labs, Cardiology, and Radiology studies reviewed as able.        Subjective:  Carlton Ritter is a 71 y.o. male for whom we were consulted for evaluation and treatment of acute kidney injury and hyperkalemia.  Admitted on 11/15 with COVID-19 pneumonia. Required intubation on 11/24 due to worsening hypoxia.  Levophed also started on 11/24. Starting on 11/24 creatinine and potassium levels started climbing. Patient started on TID Lokelma on 11/25.  Remained persistently hyperkalemic, but creatinine and urine output had improved. On 12/04 patient developed worsening hypoxia, more hemodynamically unstable. Renal function and urine output worsened.     Today remains intubated and sedated. Now on maximum dose Levophed and Vasopressin drips. Also on Edgar drip requiring increasing dose overnight.  Urine output anuric.  Potassium level continues to climb in spite of medical management.    Allergies:  Patient has no known allergies.    Home Meds:  Medications Prior to Admission   Medication Sig Dispense Refill Last Dose   • apixaban (ELIQUIS) 5 MG tablet tablet Take 1 tablet by mouth Every 12 (Twelve) Hours. 60 tablet 2 Past Week at Unknown time   • ezetimibe (ZETIA) 10 MG tablet Take 10 mg by mouth Daily.   Past Week at Unknown time   • olmesartan  (BENICAR) 20 MG tablet Take 40 mg by mouth Daily.   Past Week at Unknown time       Medicines:  Current Facility-Administered Medications   Medication Dose Route Frequency Provider Last Rate Last Admin   • acetaminophen (TYLENOL) tablet 650 mg  650 mg Oral Q4H PRN Nikolas Gray MD   650 mg at 11/24/21 0806    Or   • acetaminophen (TYLENOL) suppository 650 mg  650 mg Rectal Q4H PRN Nikolas Gray MD       • acetaminophen (TYLENOL) tablet 650 mg  650 mg Oral Q4H PRN Nikolas Gray MD        Or   • acetaminophen (TYLENOL) suppository 650 mg  650 mg Rectal Q4H PRN Nikolas Gray MD       • albuterol sulfate HFA (PROVENTIL HFA;VENTOLIN HFA;PROAIR HFA) inhaler 2 puff  2 puff Inhalation Q4H - RT Nikolas Gray MD   2 puff at 12/06/21 0723   • artificial tears ophthalmic ointment   Both Eyes Q4H Charles Hamilton MD   Given at 12/06/21 0626   • ascorbic acid (VITAMIN C) tablet 500 mg  500 mg Oral Daily Nikolas Gray MD   500 mg at 12/05/21 0818   • Beneprotein packet 3 packet  3 packet Per G Tube TID Julito Main MD   3 packet at 12/05/21 1509   • benzonatate (TESSALON) capsule 200 mg  200 mg Oral Q4H PRN Nikolas Gray MD   200 mg at 11/24/21 0807   • budesonide-formoterol (SYMBICORT) 160-4.5 MCG/ACT inhaler 2 puff  2 puff Inhalation BID - RT Aliya Fam APRN   2 puff at 12/06/21 0723   • calcium chloride injection 1 g  1 g Intravenous Once Mitesh Huff APRN       • cholecalciferol (VITAMIN D3) tablet 1,000 Units  1,000 Units Oral Daily Nikolas Gray MD   1,000 Units at 12/05/21 0817   • dexamethasone (DECADRON) tablet 6 mg  6 mg Oral Daily Reji Bhagat MD   6 mg at 12/05/21 0816    Or   • dexamethasone (DECADRON) injection 6 mg  6 mg Intravenous Daily Reji Bhagat MD       • dextromethorphan polistirex ER (DELSYM) 30 MG/5ML oral suspension 60 mg  60 mg Oral Q12H PRN Nikolas Gray MD   60 mg at 11/23/21 2011   • dextrose (D50W) (25 g/50 mL) IV  injection 25 g  25 g Intravenous Q15 Min PRN Nikolas Gray MD       • dextrose (D50W) (25 g/50 mL) IV injection 50 mL  50 mL Intravenous Q1H PRN Aravind Landon MD   50 mL at 11/26/21 1235   • dextrose (D50W) (25 g/50 mL) IV injection 50 mL  50 mL Intravenous Once Mitesh Huff APRN       • dextrose (GLUTOSE) oral gel 15 g  15 g Oral Q15 Min PRN Nikolas Gray MD       • dextrose 5 % 850 mL with sodium bicarbonate 8.4 % 150 mEq infusion   Intravenous Continuous Mitesh Huff APRN 150 mL/hr at 12/06/21 0854 Rate Change at 12/06/21 0854   • enoxaparin (LOVENOX) syringe 80 mg  1 mg/kg Subcutaneous Q24H Julito Main MD       • famotidine (PEPCID) tablet 20 mg  20 mg Oral Daily Aravind Landon MD   20 mg at 12/05/21 0817   • fenofibrate (TRICOR) tablet 48 mg  48 mg Oral Daily Aravind Landon MD   48 mg at 12/05/21 0818   • fentaNYL 2500 mcg/250 mL NS infusion   mcg/hr Intravenous Titrated Julito Main MD 20 mL/hr at 12/05/21 2055 200 mcg/hr at 12/05/21 2055   • glucagon (human recombinant) (GLUCAGEN DIAGNOSTIC) injection 1 mg  1 mg Subcutaneous Q15 Min PRN Nikolas Gray MD       • guaiFENesin (ROBITUSSIN) 100 MG/5ML oral solution 200 mg  200 mg Oral TID Aliya Fam APRN   200 mg at 12/05/21 1509   • insulin lispro (humaLOG) injection 0-9 Units  0-9 Units Subcutaneous Q6H Aravind Landon MD   2 Units at 12/06/21 0037   • insulin regular (humuLIN R,novoLIN R) injection 10 Units  10 Units Intravenous Once Mitesh Huff APRN       • melatonin tablet 3 mg  3 mg Oral Nightly Nikolas Gray MD   3 mg at 12/04/21 2003   • meropenem (MERREM) 1 g in sodium chloride 0.9 % 100 mL IVPB-VTB  1 g Intravenous Q12H Kelsey Zuñiga MD   1 g at 12/06/21 0626   • multivitamin with minerals 1 tablet  1 tablet Oral Daily Nikolas Gray MD   1 tablet at 12/05/21 1048   • norepinephrine (LEVOPHED) 8 mg in 250 mL NS infusion (premix)  0.02-0.3 mcg/kg/min  Intravenous Titrated Aravind Landon MD 50.5 mL/hr at 12/06/21 0832 0.3 mcg/kg/min at 12/06/21 0832   • ondansetron (ZOFRAN) tablet 4 mg  4 mg Oral Q6H PRN Nikolas Gray MD        Or   • ondansetron (ZOFRAN) injection 4 mg  4 mg Intravenous Q6H PRN Nikolas Gray MD   4 mg at 12/04/21 2041   • phenylephrine (AMY-SYNEPHRINE) 50 mg in sodium chloride 0.9 % 250 mL infusion  0.5-3 mcg/kg/min Intravenous Titrated Hernandez Marie DO 36 mL/hr at 12/06/21 0602 1.4 mcg/kg/min at 12/06/21 0602   • polyethylene glycol (MIRALAX) packet 17 g  17 g Oral Daily Aravind Landon MD   17 g at 12/01/21 0806   • propofol (DIPRIVAN) infusion 10 mg/mL 100 mL  5-75 mcg/kg/min Intravenous Titrated Aravind Landon MD 37.7 mL/hr at 12/04/21 1908 70 mcg/kg/min at 12/04/21 1908   • rocuronium (ZEMURON) injection 22.4 mg  250 mcg/kg Intravenous Once PRN Charles Hamilton MD       • sodium chloride nasal spray 2 spray  2 spray Each Nare PRN Kelsey Zuñiga MD       • sodium zirconium cyclosilicate (LOKELMA) pack 10 g  10 g Oral TID Mitesh Huff, APRN       • thiamine (VITAMIN B-1) tablet 100 mg  100 mg Oral Daily Julito Main MD   100 mg at 12/05/21 0818   • Vasopressin (PITRESSIN) 20 Units in sodium chloride 0.9 % 100 mL infusion  0.03 Units/min Intravenous Continuous Julito Main MD 9 mL/hr at 12/06/21 0208 0.03 Units/min at 12/06/21 0208   • zinc sulfate (ZINCATE) capsule 220 mg  220 mg Oral Daily Nikolas Gray MD   220 mg at 12/05/21 0816       Past Medical History:  Past Medical History:   Diagnosis Date   • Acute saddle pulmonary embolism with acute cor pulmonale (HCC)    • Hyperlipidemia    • Hypertension        Past Surgical History:  Past Surgical History:   Procedure Laterality Date   • CARDIAC CATHETERIZATION N/A 10/21/2016    Procedure: Thrombolytic Therapy;  Surgeon: Andrew Akins MD;  Location:  [facility] CATH INVASIVE LOCATION;  Service:    • EKOS CATHETER REMOVAL Right 10/22/2016     Procedure: Ekos catheter removal with stent placement;  Surgeon: Percy Chavarria MD;  Location: [facility] CATH INVASIVE LOCATION;  Service:        Family History  Family History   Problem Relation Age of Onset   • Heart disease Mother    • Hypertension Mother    • Heart disease Father    • Hypertension Brother        Social History  Social History     Socioeconomic History   • Marital status:    Tobacco Use   • Smoking status: Never Smoker   • Tobacco comment: Wife is surrogate decision-maker   Substance and Sexual Activity   • Alcohol use: No   • Drug use: No   • Sexual activity: Defer         Review of Systems:  Unable to obtain due to intubated and sedated    Objective:  Patient Vitals for the past 24 hrs:   BP Temp Temp src Pulse Resp SpO2 Weight   12/06/21 0751 109/67 98.8 °F (37.1 °C) -- -- (!) 38 -- --   12/06/21 0728 -- -- -- 104 (!) 34 (!) 88 % --   12/06/21 0723 -- -- -- 103 (!) 35 (!) 88 % --   12/06/21 0700 111/67 -- -- 105 -- (!) 88 % --   12/06/21 0630 -- -- -- 105 -- (!) 89 % --   12/06/21 0600 -- -- -- -- -- -- 87.8 kg (193 lb 9 oz)   12/06/21 0530 -- -- -- 108 -- 91 % --   12/06/21 0500 121/67 -- -- 107 -- 91 % --   12/06/21 0430 -- -- -- 108 -- 90 % --   12/06/21 0400 108/60 -- -- 108 -- 90 % --   12/06/21 0330 -- 98.9 °F (37.2 °C) -- 107 -- 90 % --   12/06/21 0300 106/64 -- -- 106 -- 90 % --   12/06/21 0256 -- -- -- 106 (!) 32 90 % --   12/06/21 0200 105/63 -- -- 106 -- 90 % --   12/06/21 0100 113/57 -- -- 111 -- 90 % --   12/06/21 0030 -- 97.6 °F (36.4 °C) -- 113 -- (!) 89 % --   12/06/21 0000 98/61 -- -- 111 -- (!) 87 % --   12/05/21 2303 -- -- -- 108 (!) 32 90 % --   12/05/21 2300 117/66 -- -- 108 -- 91 % --   12/05/21 2200 102/59 -- -- 110 -- (!) 89 % --   12/05/21 2100 104/58 -- -- 112 -- (!) 87 % --   12/05/21 2000 104/65 99.6 °F (37.6 °C) Axillary 110 -- (!) 86 % --   12/05/21 1930 -- -- -- 113 -- (!) 84 % --   12/05/21 1918 -- -- -- 115 (!) 32 (!) 84 % --   12/05/21 1900 114/60 -- --  111 -- (!) 84 % --   12/05/21 1800 103/68 -- -- 109 -- (!) 85 % --   12/05/21 1700 105/65 -- -- 104 -- (!) 84 % --   12/05/21 1600 110/61 97.6 °F (36.4 °C) Axillary 99 -- (!) 85 % --   12/05/21 1500 108/68 -- -- 96 -- (!) 83 % --   12/05/21 1438 -- -- -- 97 (!) 34 (!) 82 % --   12/05/21 1200 114/64 97 °F (36.1 °C) Axillary 84 -- (!) 82 % --   12/05/21 1100 112/67 -- -- 90 -- (!) 82 % --   12/05/21 1027 -- -- -- 86 (!) 32 (!) 85 % --   12/05/21 1000 105/66 -- -- 85 -- (!) 85 % --   12/05/21 0900 122/69 -- -- 84 -- (!) 86 % --       Intake/Output Summary (Last 24 hours) at 12/6/2021 0855  Last data filed at 12/6/2021 0700  Gross per 24 hour   Intake 5039.85 ml   Output 35 ml   Net 5004.85 ml     General: intubated, sedated  Chest:  equal chest rise  CVS: regular rate and rhythm  Abdominal: soft, nontender, positive bowel sounds  Extremities: no cyanosis or edema  Skin: warm and dry without rash      Labs:  Results from last 7 days   Lab Units 12/06/21 0637 12/05/21 0641 12/04/21  0608   WBC 10*3/mm3 39.16* 38.35* 18.45*   HEMOGLOBIN g/dL 9.0* 9.9* 9.2*   HEMATOCRIT % 29.5* 33.6* 30.6*   PLATELETS 10*3/mm3 84* 128* 132*         Results from last 7 days   Lab Units 12/06/21 0637 12/05/21 2023 12/05/21 0641 12/04/21 1953 12/04/21  0608   SODIUM mmol/L 130* 130* 127*   < > 132*   POTASSIUM mmol/L 7.2* 6.8* 6.3*   < > 5.6*   CHLORIDE mmol/L 92* 93* 92*   < > 98   CO2 mmol/L 17.0* 18.0* 16.0*   < > 24.0   BUN mg/dL 127* 122* 109*   < > 108*   CREATININE mg/dL 3.40* 3.01* 2.25*   < > 1.36*   CALCIUM mg/dL 6.5* 6.8* 7.0*   < > 7.7*   BILIRUBIN mg/dL 2.7*  --  1.7*  --  0.6   ALK PHOS U/L 79  --  60  --  55   ALT (SGPT) U/L >7,000*  --  >7,000*  --  60*   AST (SGOT) U/L >7,000*  --  >7,000*  --  38   GLUCOSE mg/dL 131* 151* 208*   < > 186*    < > = values in this interval not displayed.       Radiology:   Imaging Results (Last 72 Hours)     Procedure Component Value Units Date/Time    XR Chest 1 View [[id number]]  Collected: 12/04/21 1507     Updated: 12/04/21 1512    Narrative:      EXAM: XR CHEST 1 VW- 12/4/2021 2:59 PM CST     HISTORY: worsenig oxygenation; U07.1-COVID-19; J12.82-Pneumonia due to  coronavirus disease 2019; R09.02-Hypoxemia; I10-Essential (primary)  hypertension; J96.01-Acute respiratory failure with hypoxia;  Z86.711-Personal history of pulmonary embolism; R73.9-Hyperglycemia,  unspecified; I26.02-Saddle embolus of pulmonary artery with acute cor  pulmonale; E78.5-Hyperlipidemia, unspecified; R06.00-Dyspnea, unspeci       COMPARISON: November 28, 2021.     TECHNIQUE: Frontal radiograph of the chest     FINDINGS:   Bilateral parenchymal infiltrates are present. This is extensive  pneumonia. There is no improvement from November 28, 2021.. Cardiac  silhouettes moderately enlarged. Endotracheal tube and nasogastric tube  are stable in position..      The osseous structures and surrounding soft tissues demonstrate no acute  abnormality.          Impression:      1. Extensive bilateral airspace filling infiltrates consistent with  pneumonia. There is no improvement from November 28, 2021.        This report was finalized on 12/04/2021 15:09 by Dr. Tobias Freedman MD.          Culture:  Respiratory Culture   Date Value Ref Range Status   11/25/2021 Light growth (2+) Pseudomonas aeruginosa (A)  Final   11/25/2021 (A)  Final    Light growth (2+) Klebsiella pneumoniae ssp pneumoniae   11/25/2021 No Normal Respiratory Michelle (A)  Final         Assessment   1.  Acute kidney injury, ATN--worse today  2.  Severe Hyperkalemia  3.  COVID-19 pneumonia  4.  Acute respiratory failure--intubated  5.  Metabolic acidosis  6.  Hyponatremia   7.  Hypermagnesemia     Plan:  1.  Increase bicarb IV fluids to 150 ml/hour  2.  Repeat D50, insulin, calcium for severe hyperkalemia  3.  Increase Lokelma to TID  4.  Potassium continues to climb despite medical treatment, blood pressure worsening despite 3 vasopressors. Discussed with  family at bedside that patient would be unable to tolerate any form of RRT at this point.  It would appear that death is imminent.      Mitesh Huff, APRN  12/6/2021  08:55 CST

## 2021-12-06 NOTE — PROGRESS NOTES
[facility] Medicine Services  INPATIENT PROGRESS NOTE    Length of Stay: 21  Date of Admission: 11/15/2021  Primary Care Physician: Jose Luciano MD    Subjective   Chief Complaint: Covid pneumonia/respiratory failure/shock liver/renal failure/vascular failure.    HPI   Multisystem failure.  T-max 99.6.  T-current 98.8.  Patient requiring 3 pressors to keep the blood pressure elevated.  Prognosis is poor.    Review of Systems   Unable to assess secondary to the patient's intubated and sedated state.     All pertinent negatives and positives are as above. All other systems have been reviewed and are negative unless otherwise stated.     Objective    Temp:  [97 °F (36.1 °C)-99.6 °F (37.6 °C)] 98.8 °F (37.1 °C)  Heart Rate:  [] 99  Resp:  [32-38] 38  BP: ()/(57-68) 109/67  Arterial Line BP: (76-96)/(60-69) 79/64  FiO2 (%):  [100 %] 100 %    Intake/Output Summary (Last 24 hours) at 12/6/2021 1033  Last data filed at 12/6/2021 0700  Gross per 24 hour   Intake 5039.85 ml   Output 35 ml   Net 5004.85 ml     Physical Exam  Vitals reviewed.   Constitutional:       Appearance: He is well-developed.      Comments: Bear hugger in place.   HENT:      Head: Normocephalic.   Eyes:      General: No scleral icterus.     Pupils: Pupils are equal, round, and reactive to light.   Neck:      Thyroid: No thyromegaly.      Vascular: No carotid bruit or JVD.      Trachea: No tracheal deviation.   Cardiovascular:      Rate and Rhythm: Normal rate and regular rhythm.      Heart sounds: No murmur heard.  No friction rub. No gallop.    Pulmonary:      Effort: No respiratory distress.      Breath sounds: No wheezing or rales.      Comments: Intubated .  Diminished breath sound bilateral, clear.  Chest:      Chest wall: No tenderness.   Abdominal:      General: There is no distension.      Palpations: Abdomen is soft.      Tenderness: There is no abdominal tenderness.      Comments:  Distended/firm.  Hypoactive.   Musculoskeletal:      Cervical back: Normal range of motion and neck supple.   Skin:     General: Skin is warm and dry.      Capillary Refill: Capillary refill takes 2 to 3 seconds.      Findings: No rash.   Neurological:      Mental Status: He is alert.      Cranial Nerves: No cranial nerve deficit.      Motor: Weakness present.      Coordination: Coordination abnormal.      Gait: Gait abnormal.         Results Review:  Lab Results (last 24 hours)     Procedure Component Value Units Date/Time    Comprehensive Metabolic Panel [[id number]]  (Abnormal) Collected: 12/06/21 0637    Specimen: Blood Updated: 12/06/21 0731     Glucose 131 mg/dL       mg/dL      Creatinine 3.40 mg/dL      Sodium 130 mmol/L      Potassium 7.2 mmol/L      Chloride 92 mmol/L      CO2 17.0 mmol/L      Calcium 6.5 mg/dL      Total Protein 5.1 g/dL      Albumin 2.60 g/dL      ALT (SGPT) >7,000 U/L      AST (SGOT) >7,000 U/L      Alkaline Phosphatase 79 U/L      Total Bilirubin 2.7 mg/dL      eGFR Non African Amer 18 mL/min/1.73      Globulin 2.5 gm/dL      A/G Ratio 1.0 g/dL      BUN/Creatinine Ratio 37.4     Anion Gap 21.0 mmol/L     Narrative:      GFR Normal >60  Chronic Kidney Disease <60  Kidney Failure <15      Manual Differential [[id number]]  (Abnormal) Collected: 12/06/21 0637    Specimen: Blood Updated: 12/06/21 0713     Neutrophil % 86.0 %      Lymphocyte % 1.0 %      Monocyte % 5.0 %      Bands %  5.0 %      Metamyelocyte % 3.0 %      Neutrophils Absolute 35.64 10*3/mm3      Lymphocytes Absolute 0.39 10*3/mm3      Monocytes Absolute 1.96 10*3/mm3      nRBC 11.0 /100 WBC      Basophilic Stippling Slight/1+     Crenated RBC's Slight/1+     Macrocytes Slight/1+     Poikilocytes Slight/1+     Polychromasia Slight/1+     Toxic Granulation Slight/1+     Vacuolated Neutrophils Mod/2+     Platelet Estimate Decreased    CBC & Differential [[id number]]  (Abnormal) Collected: 12/06/21 0637    Specimen: Blood  Updated: 12/06/21 0713    Narrative:      The following orders were created for panel order CBC & Differential.  Procedure                               Abnormality         Status                     ---------                               -----------         ------                     CBC Auto Differential[[id number]]        Abnormal            Final result                 Please view results for these tests on the individual orders.    CBC Auto Differential [[id number]]  (Abnormal) Collected: 12/06/21 0637    Specimen: Blood Updated: 12/06/21 0713     WBC 39.16 10*3/mm3      RBC 2.95 10*6/mm3      Hemoglobin 9.0 g/dL      Hematocrit 29.5 %      .0 fL      MCH 30.5 pg      MCHC 30.5 g/dL      RDW 14.2 %      RDW-SD 49.9 fl      MPV 13.1 fL      Platelets 84 10*3/mm3     C-reactive Protein [[id number]]  (Abnormal) Collected: 12/06/21 0637    Specimen: Blood Updated: 12/06/21 0703     C-Reactive Protein 7.18 mg/dL     POC Glucose Once [[id number]]  (Normal) Collected: 12/06/21 0532    Specimen: Blood Updated: 12/06/21 0543     Glucose 129 mg/dL      Comment: : [id number] Mariia Moreno ID: [id number]       Blood Gas, Arterial - [[id number]]  (Abnormal) Collected: 12/06/21 0318    Specimen: Arterial Blood Updated: 12/06/21 0325     Site Arterial Line     Jason's Test N/A     pH, Arterial 7.115 pH units      Comment: 85 Value below critical limit        pCO2, Arterial 67.4 mm Hg      Comment: 86 Value above critical limit        pO2, Arterial 62.7 mm Hg      Comment: 84 Value below reference range        HCO3, Arterial 21.6 mmol/L      Base Excess, Arterial -8.0 mmol/L      Comment: 84 Value below reference range        O2 Saturation, Arterial 84.4 %      Comment: 84 Value below reference range        Temperature 37.0 C      Barometric Pressure for Blood Gas 745 mmHg      Modality Ventilator     FIO2 100 %      Ventilator Mode AC     Set Tidal Volume 450     Set Mech Resp Rate 32.0     PEEP 14.0      Notified Who KO HUNTER RN     Notified By [id number]     Notified Time 12/06/2021 03:27     Collected by [id number]     Comment: Meter: [id number]     :  [id number]        pCO2, Temperature Corrected 67.4 mm Hg      pH, Temp Corrected 7.115 pH Units      pO2, Temperature Corrected 62.7 mm Hg     POC Glucose Once [[id number]]  (Abnormal) Collected: 12/05/21 2342    Specimen: Blood Updated: 12/05/21 2354     Glucose 192 mg/dL      Comment: : [id number] FabioFcopaula Moreno ID: [id number]       Basic Metabolic Panel [[id number]]  (Abnormal) Collected: 12/05/21 2023    Specimen: Blood Updated: 12/05/21 2114     Glucose 151 mg/dL       mg/dL      Creatinine 3.01 mg/dL      Sodium 130 mmol/L      Potassium 6.8 mmol/L      Chloride 93 mmol/L      CO2 18.0 mmol/L      Calcium 6.8 mg/dL      eGFR Non African Amer 21 mL/min/1.73      BUN/Creatinine Ratio 40.5     Anion Gap 19.0 mmol/L     Narrative:      GFR Normal >60  Chronic Kidney Disease <60  Kidney Failure <15      POC Glucose Once [[id number]]  (Abnormal) Collected: 12/05/21 1728    Specimen: Blood Updated: 12/05/21 1741     Glucose 211 mg/dL      Comment: : [id number] Francisco Zapata ID: [id number]       POC Glucose Once [[id number]]  (Abnormal) Collected: 12/05/21 1147    Specimen: Blood Updated: 12/05/21 1158     Glucose 239 mg/dL      Comment: : [id number] Francisco HatfieldMeter ID: [id number]              Cultures:  No results found for: BLOODCX, URINECX, WOUNDCX, MRSACX, RESPCX, STOOLCX    Radiology Data:    Imaging Results (Last 24 Hours)     ** No results found for the last 24 hours. **          No Known Allergies    Scheduled meds:   artificial tears, , Both Eyes, Q4H  ascorbic acid, 500 mg, Oral, Daily  Beneprotein, 3 packet, Per G Tube, TID  budesonide-formoterol, 2 puff, Inhalation, BID - RT  cholecalciferol, 1,000 Units, Oral, Daily  dexamethasone, 6 mg, Oral, Daily   Or  dexamethasone, 6 mg, Intravenous, Daily  enoxaparin, 1  mg/kg, Subcutaneous, Q24H  famotidine, 20 mg, Oral, Daily  fenofibrate, 48 mg, Oral, Daily  guaiFENesin, 200 mg, Oral, TID  insulin lispro, 0-9 Units, Subcutaneous, Q6H  ipratropium-albuterol, 3 mL, Nebulization, 4x Daily - RT  melatonin, 3 mg, Oral, Nightly  meropenem, 500 mg, Intravenous, Q12H  multivitamin with minerals, 1 tablet, Oral, Daily  polyethylene glycol, 17 g, Oral, Daily  sodium zirconium cyclosilicate, 10 g, Oral, TID  thiamine, 100 mg, Oral, Daily  zinc sulfate, 220 mg, Oral, Daily        PRN meds:  •  acetaminophen **OR** acetaminophen  •  acetaminophen **OR** acetaminophen  •  benzonatate  •  dextromethorphan polistirex ER  •  dextrose  •  dextrose  •  dextrose  •  glucagon (human recombinant)  •  ondansetron **OR** ondansetron  •  rocuronium  •  sodium chloride    Assessment/Plan       Pneumonia due to COVID-19 virus    Essential hypertension    Acute respiratory failure with hypoxia (HCC)    History of pulmonary embolism    Hyperglycemia    Renal failure    Liver failure (HCC)    Peripheral vascular failure (HCC)      Plan:    Covid -19-pneumonia/respiratory failure hypoxia.  Patient has been vaccinated with Covid at the end of March Pfizer, no booster shot yet.  Patient is outside window as remdesivir window.  Intubated.  Fentanyl drip.  Propofol drip.    Decadron . Pulmonary consult.  Status post Tocilizumab.  ID consult.  Albuterol inhaler.  Vitamin C.  Melatonin at night.  Multivitamins.  Symbicort.  Vitamin D.  Robitussin.  Zinc.  Tessalon Perle as needed.  Delsym as needed.  Incentive spirometer.  Melatonin at night.   Meropenem.  Fluconazole.  Vancomycin..  Chest x-ray-Extensive bilateral airspace filling infiltrates consistent with pneumonia- no improvement from November 28, 2021.  Ventilation-assist-control, FiO2 100, PEEP 14, tidal volume 450, rate 32.  Maxing out .    Hypotension.  Levophed drip.  Edgar-Synephrine drip.  Vasopressin drip.    Hypertension/hyperlipidemia.  Lipitor.   TriCor.  Hold Cozaar due to hypotension.  Echocardiogram-ejection fraction 61 to 65%, mild concentric hypertrophy, diastolic dysfunction grade 1, no significant valvular dysfunction, normal right ventricle cavity size and function.    Elevated liver enzyme.  Shock liver.     Hyperkalemia.   Continue Lokelma.   Consult nephrology.  D50/insulin/calcium.     Acute renal failure.  Slightly worsening creatinine.  Nephrology consulted.  Bicarb drip.     Hyponatremia.  Stable.     History of saddle pulmonary embolism with cor pulmonale/DVT.  Lovenox therapeutic.  CTA of the chest-No evidence of pulmonary embolus, extensive bilateral groundglass opacity and nodular consolidation- most suggestive of pneumonia.  Doppler lower extremities- evidence of deep venous thrombosis in the bilateral lower extremities.BLE   RT: (+) semi-mobile thrombus in popliteal vein   LT: (+) gastrocnemius veins     Thrombocytopenia . Decrease in platelets     Reflux . Pepcid.  Zofran as needed.     Obesity.  BMI 31.     Diabetes.  Sliding scale.  Hemoglobin A1c 6.7.     Nutrition.  Thiamine.  Consult nutrition for NG feeding.     Respiratory culture-light Pseudomonas and Klebsiella.  MRSA-negative, respiratory panel-negative, previous blood culture shows no growth 5 days.  Strep pneumo-negative.  Legionella antigen -negative. Covid-19-positive.    Discharge Planning: 3 to 6 days.  Poor prognosis multisystem failure-renal cyst, liver, cardiovascular.    Electronically signed by Aravind Landon MD, 12/06/21, 8:01 AM CST.

## 2021-12-06 NOTE — CASE MANAGEMENT/SOCIAL WORK
Continued Stay Note   [place]     Patient Name: Carlton Ritter  MRN: [id number]  Today's Date: 12/6/2021    Admit Date: 11/15/2021     Discharge Plan     Row Name 12/06/21 1128       Plan    Plan Comments PT remains intubated. DC plans unclear. Will continue to follow and assist as needed.               Discharge Codes    No documentation.                     MIGUEL ANGEL Walton

## 2021-12-06 NOTE — PROGRESS NOTES
[facility]  INPATIENT WOUND CARE    PROGRESS NOTE    Today's Date: 12/06/21    Patient Name: Carlton Ritter  MRN: [id number]  CSN: [id number]  PCP: Jose Luciano MD  Referring Provider: Julito Main MD  Attending Provider: Aravind Landon MD  Length of Stay: 21    SUBJECTIVE   Chief Complaint: Stage 2 pressure injury of left cheek    HPI: Carlton Ritter continues care in CCU room 12.  Patient remains intubated and sedated with use of fentanyl.  He is now out of of enhanced droplet/contact precautions.  Patient is suffering from multiorgan failure.  He has no urine output.  He is not a candidate for renal replacement therapy due to significant hemodynamic instability.  Patient remains on 3 vasopressors for blood pressure support.  Was able to physically assess patient today to find buttocks and sacrococcygeal area pink and blanchable.  There are no acute changes to the wound on left cheek.       Visit Dx:    ICD-10-CM ICD-9-CM   1. Pneumonia due to COVID-19 virus  U07.1 480.8    J12.82 079.89   2. Hypoxemia  R09.02 799.02   3. Essential hypertension  I10 401.9   4. Acute respiratory failure with hypoxia (HCC)  J96.01 518.81   5. History of pulmonary embolism  Z86.711 V12.55   6. Hyperglycemia  R73.9 790.29   7. Acute saddle pulmonary embolism with acute cor pulmonale (HCC)  I26.02 415.13     415.0   8. Hyperlipidemia, unspecified hyperlipidemia type  E78.5 272.4   9. Dyspnea, unspecified type  R06.00 786.09   10. Acute venous embolism and thrombosis of deep vessels of distal end of right lower extremity (HCC)  I82.4Z1 453.42     Patient Active Problem List   Diagnosis   • Acute saddle pulmonary embolism with acute cor pulmonale (HCC)   • Essential hypertension   • Hyperlipidemia   • Dyspnea   • Acute venous embolism and thrombosis of deep vessels of distal end of right lower extremity (HCC)   • Pneumonia due to COVID-19 virus   • Acute respiratory failure with hypoxia (HCC)   • History  of pulmonary embolism   • Hyperglycemia   • Renal failure   • Liver failure (HCC)   • Peripheral vascular failure (HCC)       History:   Past Medical History:   Diagnosis Date   • Acute saddle pulmonary embolism with acute cor pulmonale (HCC)    • Hyperlipidemia    • Hypertension      Past Surgical History:   Procedure Laterality Date   • CARDIAC CATHETERIZATION N/A 10/21/2016    Procedure: Thrombolytic Therapy;  Surgeon: Andrew Akins MD;  Location:  [facility] CATH [facility]VASIVE LOCATION;  Service:    • EKOS CATHETER REMOVAL Right 10/22/2016    Procedure: Ekos catheter removal with stent placement;  Surgeon: Percy Chavarria MD;  Location:  [facility] CATH INVASIVE LOCATION;  Service:      Social History     Socioeconomic History   • Marital status:    Tobacco Use   • Smoking status: Never Smoker   • Tobacco comment: Wife is surrogate decision-maker   Substance and Sexual Activity   • Alcohol use: No   • Drug use: No   • Sexual activity: Defer       Allergies:  No Known Allergies    Medications:    Current Facility-Administered Medications:   •  acetaminophen (TYLENOL) tablet 650 mg, 650 mg, Oral, Q4H PRN, 650 mg at 11/24/21 0806 **OR** acetaminophen (TYLENOL) suppository 650 mg, 650 mg, Rectal, Q4H PRN, Nikolas Gray MD  •  acetaminophen (TYLENOL) tablet 650 mg, 650 mg, Oral, Q4H PRN **OR** acetaminophen (TYLENOL) suppository 650 mg, 650 mg, Rectal, Q4H PRN, Nikolas Gray MD  •  artificial tears ophthalmic ointment, , Both Eyes, Q4H, Charles Hamilton MD, Given at 12/06/21 0626  •  ascorbic acid (VITAMIN C) tablet 500 mg, 500 mg, Oral, Daily, Nikolas Gray MD, 500 mg at 12/06/21 0947  •  Beneprotein packet 3 packet, 3 packet, Per G Tube, TID, Julito Main MD, 3 packet at 12/06/21 0947  •  benzonatate (TESSALON) capsule 200 mg, 200 mg, Oral, Q4H PRN, Nikolas Gray MD, 200 mg at 11/24/21 0807  •  budesonide-formoterol (SYMBICORT) 160-4.5 MCG/ACT inhaler 2 puff, 2 puff, Inhalation, BID - RT, Cristel  Aliya PATEL, APRN, 2 puff at 12/06/21 0723  •  cholecalciferol (VITAMIN D3) tablet 1,000 Units, 1,000 Units, Oral, Daily, Nikolas Gray MD, 1,000 Units at 12/06/21 0947  •  dexamethasone (DECADRON) tablet 6 mg, 6 mg, Oral, Daily, 6 mg at 12/06/21 0947 **OR** dexamethasone (DECADRON) injection 6 mg, 6 mg, Intravenous, Daily, Reji Bhagat MD  •  dextromethorphan polistirex ER (DELSYM) 30 MG/5ML oral suspension 60 mg, 60 mg, Oral, Q12H PRN, Nikolas Gray MD, 60 mg at 11/23/21 2011  •  dextrose (D50W) (25 g/50 mL) IV injection 25 g, 25 g, Intravenous, Q15 Min PRN, Nikolas Gray MD  •  dextrose (D50W) (25 g/50 mL) IV injection 50 mL, 50 mL, Intravenous, Q1H PRN, Aravind Landon MD, 50 mL at 11/26/21 1235  •  dextrose (GLUTOSE) oral gel 15 g, 15 g, Oral, Q15 Min PRN, Nikolas Gray MD  •  dextrose 5 % 850 mL with sodium bicarbonate 8.4 % 150 mEq infusion, , Intravenous, Continuous, Mitesh Huff, APRN, Last Rate: 150 mL/hr at 12/06/21 0854, Rate Change at 12/06/21 0854  •  enoxaparin (LOVENOX) syringe 80 mg, 1 mg/kg, Subcutaneous, Q24H, Julito Main MD, 80 mg at 12/06/21 0947  •  famotidine (PEPCID) tablet 20 mg, 20 mg, Oral, Daily, Aravind Ladnon MD, 20 mg at 12/06/21 0947  •  fenofibrate (TRICOR) tablet 48 mg, 48 mg, Oral, Daily, Aravind Landon MD, 48 mg at 12/06/21 0948  •  fentaNYL 2500 mcg/250 mL NS infusion,  mcg/hr, Intravenous, Titrated, Julito Main MD, Last Rate: 20 mL/hr at 12/06/21 0948, 200 mcg/hr at 12/06/21 0948  •  glucagon (human recombinant) (GLUCAGEN DIAGNOSTIC) injection 1 mg, 1 mg, Subcutaneous, Q15 Min PRN, Nikolas Gray MD  •  guaiFENesin (ROBITUSSIN) 100 MG/5ML oral solution 200 mg, 200 mg, Oral, TID, Aliya Fam, APRN, 200 mg at 12/06/21 0947  •  insulin lispro (humaLOG) injection 0-9 Units, 0-9 Units, Subcutaneous, Q6H, Aravind Landon MD, 2 Units at 12/06/21 0037  •  ipratropium-albuterol (DUO-NEB) nebulizer solution 3 mL, 3 mL,  Nebulization, 4x Daily - RT, ShawnaLeola, APRN, 3 mL at 12/06/21 1030  •  melatonin tablet 3 mg, 3 mg, Oral, Nightly, Nikolas Gray MD, 3 mg at 12/04/21 2003  •  meropenem (MERREM) 500 mg in sodium chloride 0.9 % 100 mL IVPB-VTB, 500 mg, Intravenous, Q12H, Kelsey Zuñiga MD  •  multivitamin with minerals 1 tablet, 1 tablet, Oral, Daily, Nikolas Gray MD, 1 tablet at 12/06/21 0947  •  norepinephrine (LEVOPHED) 8 mg in 250 mL NS infusion (premix), 0.02-0.3 mcg/kg/min, Intravenous, Titrated, Aravind Landon MD, Last Rate: 50.5 mL/hr at 12/06/21 0832, 0.3 mcg/kg/min at 12/06/21 0832  •  ondansetron (ZOFRAN) tablet 4 mg, 4 mg, Oral, Q6H PRN **OR** ondansetron (ZOFRAN) injection 4 mg, 4 mg, Intravenous, Q6H PRN, Nikolas Gray MD, 4 mg at 12/04/21 2041  •  phenylephrine (AMY-SYNEPHRINE) 50 mg in sodium chloride 0.9 % 250 mL infusion, 0.5-3 mcg/kg/min, Intravenous, Titrated, Hernandez Marie DO, Last Rate: 36 mL/hr at 12/06/21 0602, 1.4 mcg/kg/min at 12/06/21 0602  •  polyethylene glycol (MIRALAX) packet 17 g, 17 g, Oral, Daily, Aravind Landon MD, 17 g at 12/06/21 0947  •  propofol (DIPRIVAN) infusion 10 mg/mL 100 mL, 5-75 mcg/kg/min, Intravenous, Titrated, Aravind Landon MD, Last Rate: 37.7 mL/hr at 12/04/21 1908, 70 mcg/kg/min at 12/04/21 1908  •  rocuronium (ZEMURON) injection 22.4 mg, 250 mcg/kg, Intravenous, Once PRN, Charles Hamilton MD  •  sodium chloride nasal spray 2 spray, 2 spray, Each Nare, PRN, Kelsey Zuñiga MD  •  sodium zirconium cyclosilicate (LOKELMA) pack 10 g, 10 g, Oral, TID, Mitesh Huff, APRN, 10 g at 12/06/21 1019  •  thiamine (VITAMIN B-1) tablet 100 mg, 100 mg, Oral, Daily, Julito Main MD, 100 mg at 12/06/21 0948  •  Vasopressin (PITRESSIN) 20 Units in sodium chloride 0.9 % 100 mL infusion, 0.03 Units/min, Intravenous, Continuous, Julito Main MD, Last Rate: 9 mL/hr at 12/06/21 0208, 0.03 Units/min at 12/06/21 0208  •  zinc  sulfate (ZINCATE) capsule 220 mg, 220 mg, Oral, Daily, Nikolas Gray MD, 220 mg at 12/06/21 0948    Review of Systems:  Review of Systems   Unable to perform ROS: Acuity of condition         OBJECTIVE     Vitals:    12/06/21 1045   BP: 125/67   Pulse: 111   Resp:    Temp:    SpO2: (!) 82%       PHYSICAL EXAM  Physical Exam  Vitals and nursing note reviewed.   Constitutional:       Appearance: He is overweight. He is ill-appearing.      Interventions: He is sedated and intubated.      Comments: Body mass index is 29.4 kg/m².    HENT:      Head: Normocephalic and atraumatic.   Eyes:      General: Lids are normal.         Right eye: No discharge.         Left eye: No discharge.   Cardiovascular:      Rate and Rhythm: Tachycardia and irregular rhythm  Pulmonary:      Effort: No respiratory distress. He is intubated.   Abdominal:      General: Abdomen is protuberant. There is no distension.   Musculoskeletal:      Cervical back: No edema or erythema.   Skin:     General: Skin is warm and dry.      Findings: Erythema and wound present.      Comments: Stage 2 pressure injury of left cheek-wound has a dark red/maroon wound base.  Wound regionally developed as a blister which now has ruptured and blister roof is no longer intact.  Edges are irregular and attached to wound bed.  Periwound area is pink and blanchable.    There is a scant amount of serosanguineous drainage at the site.  Bilateral buttocks and sacral region that were reportedly noted as red and blanchable are now pink and blanchable.  There is no nonblanchable erythema at this site.  Neurological:      Mental Status: He is unresponsive.      Comments: Patient is intubated and sedated          Results Review:  Lab Results (last 48 hours)     Procedure Component Value Units Date/Time    Comprehensive Metabolic Panel [[id number]]  (Abnormal) Collected: 12/06/21 0637    Specimen: Blood Updated: 12/06/21 0731     Glucose 131 mg/dL       mg/dL       Creatinine 3.40 mg/dL      Sodium 130 mmol/L      Potassium 7.2 mmol/L      Chloride 92 mmol/L      CO2 17.0 mmol/L      Calcium 6.5 mg/dL      Total Protein 5.1 g/dL      Albumin 2.60 g/dL      ALT (SGPT) >7,000 U/L      AST (SGOT) >7,000 U/L      Alkaline Phosphatase 79 U/L      Total Bilirubin 2.7 mg/dL      eGFR Non African Amer 18 mL/min/1.73      Globulin 2.5 gm/dL      A/G Ratio 1.0 g/dL      BUN/Creatinine Ratio 37.4     Anion Gap 21.0 mmol/L     Narrative:      GFR Normal >60  Chronic Kidney Disease <60  Kidney Failure <15      Manual Differential [[id number]]  (Abnormal) Collected: 12/06/21 0637    Specimen: Blood Updated: 12/06/21 0713     Neutrophil % 86.0 %      Lymphocyte % 1.0 %      Monocyte % 5.0 %      Bands %  5.0 %      Metamyelocyte % 3.0 %      Neutrophils Absolute 35.64 10*3/mm3      Lymphocytes Absolute 0.39 10*3/mm3      Monocytes Absolute 1.96 10*3/mm3      nRBC 11.0 /100 WBC      Basophilic Stippling Slight/1+     Crenated RBC's Slight/1+     Macrocytes Slight/1+     Poikilocytes Slight/1+     Polychromasia Slight/1+     Toxic Granulation Slight/1+     Vacuolated Neutrophils Mod/2+     Platelet Estimate Decreased    CBC & Differential [[id number]]  (Abnormal) Collected: 12/06/21 0637    Specimen: Blood Updated: 12/06/21 0713    Narrative:      The following orders were created for panel order CBC & Differential.  Procedure                               Abnormality         Status                     ---------                               -----------         ------                     CBC Auto Differential[[id number]]        Abnormal            Final result                 Please view results for these tests on the individual orders.    CBC Auto Differential [[id number]]  (Abnormal) Collected: 12/06/21 0637    Specimen: Blood Updated: 12/06/21 0713     WBC 39.16 10*3/mm3      RBC 2.95 10*6/mm3      Hemoglobin 9.0 g/dL      Hematocrit 29.5 %      .0 fL      MCH 30.5 pg      MCHC 30.5 g/dL       RDW 14.2 %      RDW-SD 49.9 fl      MPV 13.1 fL      Platelets 84 10*3/mm3     C-reactive Protein [[id number]]  (Abnormal) Collected: 12/06/21 0637    Specimen: Blood Updated: 12/06/21 0703     C-Reactive Protein 7.18 mg/dL     POC Glucose Once [[id number]]  (Normal) Collected: 12/06/21 0532    Specimen: Blood Updated: 12/06/21 0543     Glucose 129 mg/dL      Comment: : [id number] NessTappIndeny  Keen Impressions ID: [id number]       Blood Gas, Arterial - [[id number]]  (Abnormal) Collected: 12/06/21 0318    Specimen: Arterial Blood Updated: 12/06/21 0325     Site Arterial Line     Jason's Test N/A     pH, Arterial 7.115 pH units      Comment: 85 Value below critical limit        pCO2, Arterial 67.4 mm Hg      Comment: 86 Value above critical limit        pO2, Arterial 62.7 mm Hg      Comment: 84 Value below reference range        HCO3, Arterial 21.6 mmol/L      Base Excess, Arterial -8.0 mmol/L      Comment: 84 Value below reference range        O2 Saturation, Arterial 84.4 %      Comment: 84 Value below reference range        Temperature 37.0 C      Barometric Pressure for Blood Gas 745 mmHg      Modality Ventilator     FIO2 100 %      Ventilator Mode AC     Set Tidal Volume 450     Set Mech Resp Rate 32.0     PEEP 14.0     Notified [facility] KO HUNTER RN     Notified By [id number]     Notified Time 12/06/2021 03:27     Collected by [id number]     Comment: Meter: [id number]     :  [id number]        pCO2, Temperature Corrected 67.4 mm Hg      pH, Temp Corrected 7.115 pH Units      pO2, Temperature Corrected 62.7 mm Hg     POC Glucose Once [[id number]]  (Abnormal) Collected: 12/05/21 2342    Specimen: Blood Updated: 12/05/21 2354     Glucose 192 mg/dL      Comment: : [id number] GlassBoxjohnBasharJobsdeny  Keen Impressions ID: [id number]       Basic Metabolic Panel [[id number]]  (Abnormal) Collected: 12/05/21 2023    Specimen: Blood Updated: 12/05/21 2114     Glucose 151 mg/dL       mg/dL      Creatinine 3.01 mg/dL      Sodium 130  mmol/L      Potassium 6.8 mmol/L      Chloride 93 mmol/L      CO2 18.0 mmol/L      Calcium 6.8 mg/dL      eGFR Non African Amer 21 mL/min/1.73      BUN/Creatinine Ratio 40.5     Anion Gap 19.0 mmol/L     Narrative:      GFR Normal >60  Chronic Kidney Disease <60  Kidney Failure <15      POC Glucose Once [[id number]]  (Abnormal) Collected: 12/05/21 1728    Specimen: Blood Updated: 12/05/21 1741     Glucose 211 mg/dL      Comment: : [id number] Francisco LordelMeter ID: [id number]       POC Glucose Once [[id number]]  (Abnormal) Collected: 12/05/21 1147    Specimen: Blood Updated: 12/05/21 1158     Glucose 239 mg/dL      Comment: : [id number] Francisco SimplilearnelMeter ID: [id number]       Manual Differential [[id number]]  (Abnormal) Collected: 12/05/21 0641    Specimen: Blood Updated: 12/05/21 0756     Neutrophil % 82.0 %      Lymphocyte % 1.0 %      Monocyte % 3.0 %      Bands %  11.0 %      Metamyelocyte % 1.0 %      Myelocyte % 2.0 %      Neutrophils Absolute 35.67 10*3/mm3      Lymphocytes Absolute 0.38 10*3/mm3      Monocytes Absolute 1.15 10*3/mm3      nRBC 8.0 /100 WBC      Anisocytosis Slight/1+     Basophilic Stippling Slight/1+     Crenated RBC's Slight/1+     Poikilocytes Slight/1+     Polychromasia Mod/2+     WBC Morphology Normal     Platelet Estimate Decreased    Comprehensive Metabolic Panel [[id number]]  (Abnormal) Collected: 12/05/21 0641    Specimen: Blood Updated: 12/05/21 0752     Glucose 208 mg/dL       mg/dL      Creatinine 2.25 mg/dL      Sodium 127 mmol/L      Potassium 6.3 mmol/L      Comment: Slight hemolysis detected by analyzer. Results may be affected.        Chloride 92 mmol/L      CO2 16.0 mmol/L      Calcium 7.0 mg/dL      Total Protein 5.5 g/dL      Albumin 2.70 g/dL      ALT (SGPT) >7,000 U/L      AST (SGOT) >7,000 U/L      Comment: Slight hemolysis detected by analyzer. Results may be affected.        Alkaline Phosphatase 60 U/L      Total Bilirubin 1.7 mg/dL      eGFR Non   "Amer 29 mL/min/1.73      Globulin 2.8 gm/dL      A/G Ratio 1.0 g/dL      BUN/Creatinine Ratio 48.4     Anion Gap 19.0 mmol/L     Narrative:      GFR Normal >60  Chronic Kidney Disease <60  Kidney Failure <15      Procalcitonin [[id number]]  (Abnormal) Collected: 12/05/21 0641    Specimen: Blood Updated: 12/05/21 0732     Procalcitonin 1.05 ng/mL     Narrative:      As a Marker for Sepsis (Non-Neonates):     1. <0.5 ng/mL represents a low risk of severe sepsis and/or septic shock.  2. >2 ng/mL represents a high risk of severe sepsis and/or septic shock.    As a Marker for Lower Respiratory Tract Infections that require antibiotic therapy:  PCT on Admission     Antibiotic Therapy             6-12 Hrs later  >0.5                          Strongly Recommended            >0.25 - <0.5             Recommended  0.1 - 0.25                  Discouraged                       Remeasure/reassess PCT  <0.1                         Strongly Discouraged         Remeasure/reassess PCT      As 28 day mortality risk marker: \"Change in Procalcitonin Result\" (>80% or <=80%) if Day 0 (or Day 1) and Day 4 values are available. Refer to http://www.Zeopct-calculator.com/    Change in PCT <=80 %   A decrease of PCT levels below or equal to 80% defines a positive change in PCT test result representing a higher risk for 28-day all-cause mortality of patients diagnosed with severe sepsis or septic shock.    Change in PCT >80 %   A decrease of PCT levels of more than 80% defines a negative change in PCT result representing a lower risk for 28-day all-cause mortality of patients diagnosed with severe sepsis or septic shock.                C-reactive Protein [[id number]]  (Abnormal) Collected: 12/05/21 0641    Specimen: Blood Updated: 12/05/21 0725     C-Reactive Protein 2.05 mg/dL     CBC & Differential [[id number]]  (Abnormal) Collected: 12/05/21 0641    Specimen: Blood Updated: 12/05/21 0708    Narrative:      The following orders were created " for panel order CBC & Differential.  Procedure                               Abnormality         Status                     ---------                               -----------         ------                     CBC Auto Differential[[id number]]        Abnormal            Final result                 Please view results for these tests on the individual orders.    CBC Auto Differential [[id number]]  (Abnormal) Collected: 12/05/21 0641    Specimen: Blood Updated: 12/05/21 0708     WBC 38.35 10*3/mm3      RBC 3.31 10*6/mm3      Hemoglobin 9.9 g/dL      Hematocrit 33.6 %      .5 fL      MCH 29.9 pg      MCHC 29.5 g/dL      RDW 13.6 %      RDW-SD 49.1 fl      MPV 12.1 fL      Platelets 128 10*3/mm3     POC Glucose Once [[id number]]  (Abnormal) Collected: 12/05/21 0648    Specimen: Blood Updated: 12/05/21 0706     Glucose 228 mg/dL      Comment: : [id number] "[employer]usMeter ID: [id number]       Potassium [[id number]]  (Abnormal) Collected: 12/05/21 0003    Specimen: Blood Updated: 12/05/21 0033     Potassium 6.9 mmol/L      Comment: Specimen hemolyzed.  Results may be affected.       POC Glucose Once [[id number]]  (Abnormal) Collected: 12/05/21 0002    Specimen: Blood Updated: 12/05/21 0019     Glucose 213 mg/dL      Comment: : [id number] "[employer]eter ID: [id number]       Basic Metabolic Panel [[id number]]  (Abnormal) Collected: 12/04/21 1953    Specimen: Blood Updated: 12/04/21 2025     Glucose 203 mg/dL       mg/dL      Creatinine 1.82 mg/dL      Sodium 125 mmol/L      Potassium 6.7 mmol/L      Comment: Slight hemolysis detected by analyzer. Results may be affected.        Chloride 91 mmol/L      CO2 14.0 mmol/L      Calcium 7.2 mg/dL      eGFR Non African Amer 37 mL/min/1.73      BUN/Creatinine Ratio 61.5     Anion Gap 20.0 mmol/L     Narrative:      GFR Normal >60  Chronic Kidney Disease <60  Kidney Failure <15      POC Glucose Once [[id number]]  (Abnormal) Collected: 12/04/21 1836     Specimen: Blood Updated: 12/04/21 1841     Glucose 271 mg/dL      Comment: : [id number] Francisco LordelMeter ID: [id number]       POC Glucose Once [[id number]]  (Abnormal) Collected: 12/04/21 1204    Specimen: Blood Updated: 12/04/21 1215     Glucose 234 mg/dL      Comment: : [id number] Francisco LordelMeter ID: [id number]           Imaging Results (Last 72 Hours)     Procedure Component Value Units Date/Time    XR Chest 1 View [[id number]] Collected: 12/04/21 1507     Updated: 12/04/21 1512    Narrative:      EXAM: XR CHEST 1 VW- 12/4/2021 2:59 PM CST     HISTORY: worsenig oxygenation; U07.1-COVID-19; J12.82-Pneumonia due to  coronavirus disease 2019; R09.02-Hypoxemia; I10-Essential (primary)  hypertension; J96.01-Acute respiratory failure with hypoxia;  Z86.711-Personal history of pulmonary embolism; R73.9-Hyperglycemia,  unspecified; I26.02-Saddle embolus of pulmonary artery with acute cor  pulmonale; E78.5-Hyperlipidemia, unspecified; R06.00-Dyspnea, unspeci       COMPARISON: November 28, 2021.     TECHNIQUE: Frontal radiograph of the chest     FINDINGS:   Bilateral parenchymal infiltrates are present. This is extensive  pneumonia. There is no improvement from November 28, 2021.. Cardiac  silhouettes moderately enlarged. Endotracheal tube and nasogastric tube  are stable in position..      The osseous structures and surrounding soft tissues demonstrate no acute  abnormality.          Impression:      1. Extensive bilateral airspace filling infiltrates consistent with  pneumonia. There is no improvement from November 28, 2021.        This report was finalized on 12/04/2021 15:09 by Dr. Tobias Freedman MD.             ASSESSMENT/PLAN       Examination and evaluation of wound(s) was performed.    DIAGNOSIS:   Pressure injury of left cheek, stage 2  Endotracheally intubated  Sedated  Pneumonia due to COVID-19 virus  Acute respiratory failure with hypoxia  Impaired mobility  Renal failure  Liver  failure  Peripheral vascular failure      PLAN:   Continue wound care plan at this time.        This document has been electronically signed by KLAUS Mccarthy on 12/6/2021 10:51 CST       Time spent in face-to-face evaluation, chart review, planning and education 40 minutes with greater than 50% of time spent with patient and in coordination of care.

## 2021-12-06 NOTE — PROGRESS NOTES
PULMONARY AND CRITICAL CARE PROGRESS NOTE - [facility]    Patient: Carlton Ritter    1950    MR# [id number]    Acct# [id number]  12/06/21   09:47 CST  Referring Provider: Aravind Landon MD    Chief Complaint: Mechanically ventilated    Interval history: Patient remains intubated and sedated.  O2 sat 81% on 14 PEEP, 1.0 FiO2.  The patient is in multiorgan failure.  Creatinine 3.40, potassium 7.2, sodium 130, AST/ALT greater than 7000, WBC 39, pH 7.11, PCO2 67, pO2 62, HCO3 21.  He is currently on fentanyl, Levophed, vasopressin, and Amy-Synephrine drips.  No other aggravating or alleviating factors.     Meds:  albuterol sulfate HFA, 2 puff, Inhalation, Q4H - RT  artificial tears, , Both Eyes, Q4H  ascorbic acid, 500 mg, Oral, Daily  Beneprotein, 3 packet, Per G Tube, TID  budesonide-formoterol, 2 puff, Inhalation, BID - RT  calcium chloride, 1 g, Intravenous, Once  cholecalciferol, 1,000 Units, Oral, Daily  dexamethasone, 6 mg, Oral, Daily   Or  dexamethasone, 6 mg, Intravenous, Daily  dextrose, 50 mL, Intravenous, Once  enoxaparin, 1 mg/kg, Subcutaneous, Q24H  famotidine, 20 mg, Oral, Daily  fenofibrate, 48 mg, Oral, Daily  guaiFENesin, 200 mg, Oral, TID  insulin lispro, 0-9 Units, Subcutaneous, Q6H  insulin regular, 10 Units, Intravenous, Once  melatonin, 3 mg, Oral, Nightly  meropenem, 500 mg, Intravenous, Q12H  multivitamin with minerals, 1 tablet, Oral, Daily  polyethylene glycol, 17 g, Oral, Daily  sodium zirconium cyclosilicate, 10 g, Oral, TID  thiamine, 100 mg, Oral, Daily  zinc sulfate, 220 mg, Oral, Daily      custom IV infusion builder, , Last Rate: 150 mL/hr at 12/06/21 0854  fentanyl 10 mcg/mL,  mcg/hr, Last Rate: 200 mcg/hr (12/05/21 2055)  norepinephrine, 0.02-0.3 mcg/kg/min, Last Rate: 0.3 mcg/kg/min (12/06/21 0832)  phenylephrine (AMY-SYNEPHRINE) 50 mg in 250 mL  infusion, 0.5-3 mcg/kg/min, Last Rate: 1.4 mcg/kg/min (12/06/21 0602)  propofol, 5-75 mcg/kg/min, Last  Rate: 70 mcg/kg/min (12/04/21 1908)  vasopressin, 0.03 Units/min, Last Rate: 0.03 Units/min (12/06/21 0208)      Review of Systems:   Cannot obtain due to mechanical ventilated state    Ventilator Settings:        Resp Rate (Set): 32     FiO2 (%): 100 %  PEEP/CPAP (cm H2O): 14 cm H20  Minute Ventilation (L/min) (Obs): 15.4 L/min  Resp Rate (Observed) Vent: 34  I:E Ratio (Set): 1:1.5  I:E Ratio (Obs): 1:1.70  PIP Observed (cm H2O): 39 cm H2O     Physical Exam:  Temp:  [97 °F (36.1 °C)-99.6 °F (37.6 °C)] 98.8 °F (37.1 °C)  Heart Rate:  [] 104  Resp:  [32-38] 38  BP: ()/(57-68) 109/67  Arterial Line BP: (76-96)/(60-69) 79/64  FiO2 (%):  [100 %] 100 %    Intake/Output Summary (Last 24 hours) at 12/6/2021 0947  Last data filed at 12/6/2021 0700  Gross per 24 hour   Intake 5039.85 ml   Output 35 ml   Net 5004.85 ml     SpO2 Percentage    12/06/21 0700 12/06/21 0723 12/06/21 0728   SpO2: (!) 88% (!) 88% (!) 88%      GENERAL/CONSTITUTIONAL:  Pt is on vent. He appears gravely ill  HEENT: atraumatic  NOSE: normal no discharge  NECK: jugular veins nondistended supple, no adenopathy.  CHEST: Crackles bilaterally.  Good ventilator synchrony.  Plateau pressure is 36.   CARDIAC: tachycardia   ABDOMEN: deferred  : deferred  EXTREMITIES: Mild edema no clubbing or cyanosis  NEURO: sedated, mechanically ventilated; no seizures no tremors or fasciculations  SKIN: no jaundice.  Warm and dry    Results from last 7 days   Lab Units 12/06/21  0637 12/05/21  0641 12/04/21  0608   WBC 10*3/mm3 39.16* 38.35* 18.45*   HEMOGLOBIN g/dL 9.0* 9.9* 9.2*   PLATELETS 10*3/mm3 84* 128* 132*     Results from last 7 days   Lab Units 12/06/21  0637 12/05/21 2023 12/05/21  0641 12/03/21 2033 12/03/21  0522   SODIUM mmol/L 130* 130* 127*   < > 138  136   POTASSIUM mmol/L 7.2* 6.8* 6.3*   < > 5.7*  5.6*   BUN mg/dL 127* 122* 109*   < > 93*  97*   CREATININE mg/dL 3.40* 3.01* 2.25*   < > 1.26  1.21   CRP mg/dL 7.18*  --  2.05*   < > 2.04*    PROCALCITONIN ng/mL  --   --  1.05*  --  0.22    < > = values in this interval not displayed.     Results from last 7 days   Lab Units 12/06/21  0318 12/04/21  0301 11/30/21  0805   PH, ARTERIAL pH units 7.115* 7.256* 7.377   PCO2, ARTERIAL mm Hg 67.4* 68.0* 60.4*   PO2 ART mm Hg 62.7* 63.6* 76.4*   FIO2 % 100 100 100     Respiratory Culture   Date Value Ref Range Status   11/25/2021 Light growth (2+) Pseudomonas species (A)  Preliminary   11/25/2021 Light growth (2+) Gram Negative Bacilli (A)  Preliminary   11/25/2021 No Normal Respiratory Michelle (A)  Preliminary     Recent films:  XR Chest 1 View    Result Date: 12/4/2021  1. Extensive bilateral airspace filling infiltrates consistent with pneumonia. There is no improvement from November 28, 2021.   This report was finalized on 12/04/2021 15:09 by Dr. Tobias Freedman MD.    Films reviewed personally by me.  My interpretation: none to review today     Pulmonary Assessment:  1. Acute respiratory failure with ARDS, due to COVID-19, still very severe  2. Bilateral COVID-19 pneumonia, doubly vaccinated but without booster, last vaccine greater than 6 months ago  3. Deep vein thrombosis  4. Presumed Pseudomonas and Klebsiella pneumonias, treated  5. Steroid-induced hyperglycemia  6. Personal history of remote pulmonary embolism  7. Obesity, BMI 31  8. Acute kidney injury-due to acute tubular necrosis poor dialysis candidate per nephrology    Recommend:   · D#12 ETT.  Continue mechanical ventilation titrate oxygen for saturation 90%.  Remains on maximal ventilation, worsening respiratory acidosis, poor prognosis  · Continue Lovenox for DVT  · Continue stress ulcer prophylaxis while on vent-Pepcid  · Decadron 6mg IV daily.  He showed no improvement with BID dosing  · ABG/chest x-ray as needed  · Continue Symbicort, DC albuterol HFA, add duoneb  · Continue Robitussin  · Continue adjuvant therapy zinc, vitamin D, vitamin C  · Continue antibiotic per ID -  Pseudomonas,  Klebsiella  · Prognosis is extremely poor    Electronically signed by KLAUS Kelly on 12/6/2021 at 09:47 CST    Physician substantive portion:  Patient was still suffering in the ICU today, multiorgan failure with hepatic failure renal failure respiratory failure central nervous system failure hematologic failure you are.  Respirations were unlabored patient deeply sedated.  This point the patient appears to have virtually no chance of survival.  I would advocate for palliative care although the patient's family has continued to push for aggressive treatment.  He is not safe to try to do anything like a tracheostomy right now.  We will make no vent changes.    I have seen and examined patient personally, performing a face-to-face diagnostic evaluation with plan of care reviewed and developed with APRN and nursing staff. I have addended and/or modified the above history of present illness, physical examination, and assessment and plan to reflect my findings and impressions. Essential elements of the care plan were discussed with APRN above.  Agree with findings and assessment/plan as documented above.    Electronically signed by Reji Bhagat MD, on 12/6/2021, 18:52 CST

## 2021-12-06 NOTE — PROGRESS NOTES
Palliative Care Daily Progress Note   Chief complaint-follow up support for patient/family    Code Status:   Code Status and Medical Interventions:   Ordered at: 12/05/21 1027     Code Status (Patient has no pulse and is not breathing):    CPR (Attempt to Resuscitate)     Medical Interventions (Patient has pulse or is breathing):    Full Support      Advanced Directives: Advance Directive Status: Patient does not have advance directive   Goals of Care: Ongoing.     S: Medical record reviewed. Events noted.  Remains intubated on ventilator support FiO2 100%, PEEP 14.  Multiorgan failure with  profound hyperkalemia of 7.2 this morning. Not a candidate for renal replacement therapy per nephrology.  He remains on vasopressors x3 for blood pressure support.  He is sedated with fentanyl.  He is out of isolation precautions. Requiring Eric hugger.  Found to have bilateral DVTs. At 12:20 PM call placed to patient's spouse, Alan.  No answer, left voicemail, awaiting callback.  Discussed with nurse, Colleen who reports Dr. Landon was able to speak with family and they are currently on the way to the hospital.  Advance care planning-spoke with patient's spouse and 2 daughters in the care conference as well as son via telephone.  We discussed poor prognosis secondary to multiorgan failure-shock liver, respiratory failure, renal failure, and circulatory failure second to COVID-19 pneumonia.  Family verbalized understanding and we further explored medical priorities.  Family has elected to de-escalate care measures to no CPR.  They are considering transitioning to comfort measures and will further discuss amongst themselves.  Patient's son, Brandon is planning on traveling in from [place] later today.  Questions encouraged and answered to family satisfaction.     Review of Systems   Unable to perform ROS: intubated     Pain Assessment  CPOT and PAINAD Scales: CPOT (Critical-Care Pain Observation Tool)  CPOT Facial Expression:  0-->relaxed, neutral  CPOT Body Movements: 0-->absence of movements  CPOT Muscle Tension: 0-->relaxed  Ventilator Compliance/Vocalization: 0-->tolerating ventilator or movement  CPOT Score: 0  Pain Location: head    O:     Intake/Output Summary (Last 24 hours) at 12/6/2021 1211  Last data filed at 12/6/2021 0700  Gross per 24 hour   Intake 4640.35 ml   Output 25 ml   Net 4615.35 ml       Diagnostics: Reviewed    Current Facility-Administered Medications   Medication Dose Route Frequency Provider Last Rate Last Admin   • acetaminophen (TYLENOL) tablet 650 mg  650 mg Oral Q4H PRN Nikolas Gray MD   650 mg at 11/24/21 0806    Or   • acetaminophen (TYLENOL) suppository 650 mg  650 mg Rectal Q4H PRN Nikolas Gray MD       • acetaminophen (TYLENOL) tablet 650 mg  650 mg Oral Q4H PRN Nikolas Gray MD        Or   • acetaminophen (TYLENOL) suppository 650 mg  650 mg Rectal Q4H PRN Nikolas Gray MD       • artificial tears ophthalmic ointment   Both Eyes Q4H Charles Hamilton MD   Given at 12/06/21 0626   • ascorbic acid (VITAMIN C) tablet 500 mg  500 mg Oral Daily Nikolas Gray MD   500 mg at 12/06/21 0947   • Beneprotein packet 3 packet  3 packet Per G Tube TID Julito Main MD   3 packet at 12/06/21 0947   • benzonatate (TESSALON) capsule 200 mg  200 mg Oral Q4H PRN Nikolas Gray MD   200 mg at 11/24/21 0807   • budesonide-formoterol (SYMBICORT) 160-4.5 MCG/ACT inhaler 2 puff  2 puff Inhalation BID - RT Aliya Fam APRN   2 puff at 12/06/21 0723   • cholecalciferol (VITAMIN D3) tablet 1,000 Units  1,000 Units Oral Daily Nikolas Gray MD   1,000 Units at 12/06/21 0947   • dexamethasone (DECADRON) tablet 6 mg  6 mg Oral Daily Reji Bhagat MD   6 mg at 12/06/21 0947    Or   • dexamethasone (DECADRON) injection 6 mg  6 mg Intravenous Daily Reji Bhagat MD       • dextromethorphan polistirex ER (DELSYM) 30 MG/5ML oral suspension 60 mg  60 mg Oral Q12H PRN Marina  Nikolas MARKHAM MD   60 mg at 11/23/21 2011   • dextrose (D50W) (25 g/50 mL) IV injection 25 g  25 g Intravenous Q15 Min PRN Nikolas Gray MD       • dextrose (D50W) (25 g/50 mL) IV injection 50 mL  50 mL Intravenous Q1H PRN Aravind Landon MD   50 mL at 11/26/21 1235   • dextrose (GLUTOSE) oral gel 15 g  15 g Oral Q15 Min PRN Nikolas Gray MD       • dextrose 5 % 850 mL with sodium bicarbonate 8.4 % 150 mEq infusion   Intravenous Continuous Mitesh Huff APRN 150 mL/hr at 12/06/21 0854 Rate Change at 12/06/21 0854   • enoxaparin (LOVENOX) syringe 80 mg  1 mg/kg Subcutaneous Q24H Julito Main MD   80 mg at 12/06/21 0947   • famotidine (PEPCID) tablet 20 mg  20 mg Oral Daily Aravind Landon MD   20 mg at 12/06/21 0947   • fenofibrate (TRICOR) tablet 48 mg  48 mg Oral Daily Aravind Landon MD   48 mg at 12/06/21 0948   • fentaNYL 2500 mcg/250 mL NS infusion   mcg/hr Intravenous Titrated Julito Main MD 20 mL/hr at 12/06/21 0948 200 mcg/hr at 12/06/21 0948   • glucagon (human recombinant) (GLUCAGEN DIAGNOSTIC) injection 1 mg  1 mg Subcutaneous Q15 Min PRN Nikolas Gray MD       • guaiFENesin (ROBITUSSIN) 100 MG/5ML oral solution 200 mg  200 mg Oral TID Aliya Fam APRN   200 mg at 12/06/21 0947   • insulin lispro (humaLOG) injection 0-9 Units  0-9 Units Subcutaneous Q6H Aravind Landon MD   2 Units at 12/06/21 0037   • ipratropium-albuterol (DUO-NEB) nebulizer solution 3 mL  3 mL Nebulization 4x Daily - RT ShawnaLeola APRN   3 mL at 12/06/21 1030   • melatonin tablet 3 mg  3 mg Oral Nightly Nikolas Gray MD   3 mg at 12/04/21 2003   • meropenem (MERREM) 500 mg in sodium chloride 0.9 % 100 mL IVPB-VTB  500 mg Intravenous Q12H Kelsey Zuñiga MD       • multivitamin with minerals 1 tablet  1 tablet Oral Daily Nikolas Gray MD   1 tablet at 12/06/21 0947   • norepinephrine (LEVOPHED) 8 mg in 250 mL NS infusion (premix)  0.02-0.3 mcg/kg/min  Intravenous Titrated Aravind Landon MD 50.5 mL/hr at 12/06/21 0832 0.3 mcg/kg/min at 12/06/21 0832   • ondansetron (ZOFRAN) tablet 4 mg  4 mg Oral Q6H PRN Nikolas Gray MD        Or   • ondansetron (ZOFRAN) injection 4 mg  4 mg Intravenous Q6H PRN Nikolas Gray MD   4 mg at 12/04/21 2041   • phenylephrine (AMY-SYNEPHRINE) 50 mg in sodium chloride 0.9 % 250 mL infusion  0.5-3 mcg/kg/min Intravenous Titrated Hernandez Marie DO 66.9 mL/hr at 12/06/21 1200 2.6 mcg/kg/min at 12/06/21 1200   • polyethylene glycol (MIRALAX) packet 17 g  17 g Oral Daily Aravind Landon MD   17 g at 12/06/21 0947   • propofol (DIPRIVAN) infusion 10 mg/mL 100 mL  5-75 mcg/kg/min Intravenous Titrated Aravind Landon MD 37.7 mL/hr at 12/04/21 1908 70 mcg/kg/min at 12/04/21 1908   • rocuronium (ZEMURON) injection 22.4 mg  250 mcg/kg Intravenous Once PRN Charles Hamilton MD       • sodium chloride nasal spray 2 spray  2 spray Each Nare PRN Kelsey Zuñiga MD       • sodium zirconium cyclosilicate (LOKELMA) pack 10 g  10 g Oral TID Mitesh Huff APRN   10 g at 12/06/21 1019   • thiamine (VITAMIN B-1) tablet 100 mg  100 mg Oral Daily Julito Main MD   100 mg at 12/06/21 0948   • Vasopressin (PITRESSIN) 20 Units in sodium chloride 0.9 % 100 mL infusion  0.03 Units/min Intravenous Continuous Julito Main MD 9 mL/hr at 12/06/21 0208 0.03 Units/min at 12/06/21 0208   • zinc sulfate (ZINCATE) capsule 220 mg  220 mg Oral Daily Nikolas Gray MD   220 mg at 12/06/21 0948     custom IV infusion builder, , Last Rate: 150 mL/hr at 12/06/21 0854  fentanyl 10 mcg/mL,  mcg/hr, Last Rate: 200 mcg/hr (12/06/21 0948)  norepinephrine, 0.02-0.3 mcg/kg/min, Last Rate: 0.3 mcg/kg/min (12/06/21 0832)  phenylephrine (AMY-SYNEPHRINE) 50 mg in 250 mL  infusion, 0.5-3 mcg/kg/min, Last Rate: 2.6 mcg/kg/min (12/06/21 1200)  propofol, 5-75 mcg/kg/min, Last Rate: 70 mcg/kg/min (12/04/21 1908)  vasopressin, 0.03 Units/min,  "Last Rate: 0.03 Units/min (12/06/21 0208)      •  acetaminophen **OR** acetaminophen  •  acetaminophen **OR** acetaminophen  •  benzonatate  •  dextromethorphan polistirex ER  •  dextrose  •  dextrose  •  dextrose  •  glucagon (human recombinant)  •  ondansetron **OR** ondansetron  •  rocuronium  •  sodium chloride    A:    BP (!) 51/32   Pulse 101   Temp 98.8 °F (37.1 °C)   Resp (!) 32   Ht 172.7 cm (68\")   Wt 87.8 kg (193 lb 9 oz)   SpO2 (!) 78%   BMI 29.43 kg/m²     Vitals and nursing note reviewed.   Constitutional:       Appearance: Ill-appearing and acutely ill-appearing.      Interventions: Sedated.      Comments: Eric kuhn   Eyes:      General: Lids are normal.   HENT:      Head: Normocephalic.   Pulmonary:      Breath sounds: Decreased air movement present.      Comments: FiO2 100%, PEEP 14  Cardiovascular:      Tachycardia present.   Edema:     Peripheral edema present.  Abdominal:      General: Bowel sounds are decreased. There is distension.   Musculoskeletal:      Cervical back: Neck supple. Skin:     General: Skin is warm.      Comments: Eric kuhn   Genitourinary:     Comments: Anuric  Neurological:      Comments: Sedated     Patient status: Disease state: Deteriorating despite treatments.  Functional status: Palliative Performance Scale Score: Performance 10% based on the following measures: Ambulation: Totally bed bound, Activity and Evidence of Disease: Unable to do any work, extensive evidence of disease, Self-Care: Total care required,  Intake: Mouth care only, LOC: Drowsy or comatose   Body mass index is 29.43 kg/m².  Screening Status/Interventions Data  Psychosocial Needs: neg  Spiritual Needs: neg  Goals of Care/ACP: pos  Goals of Care/ACP Intervened: yes   Palliative Care Acuity Data  Psychosocial Acuity: normal complexity  Spiritual Acuity: normal complexity  Family support: The patient receives support from his wife..  POA/Healthcare surrogate-spouse, Alan-Next of " kin    Impression/Problem List:    1.  Pneumonia due to COVID 19 virus  2.  Acute respiratory failure with hypoxia- Day 12  3.  History of PE-on anticoagulation  4.  DVT on Lovenox  5.  Hyperglycemia  6.  NGUYỄN with ATN  7.  Hyperkalemia  8.  Gross hematuria  9.  Diabetes mellitus  10.  Multiorgan failure-shock liver, respiratory failure, renal failure, circulatory failure    P:    Recommendations/Plan:  1. plan: Goals of care include CODE STATUS CPR/full support interventions.    2.  Palliative care encounter  -Poor prognosis secondary to multiorgan failure-shock liver, respiratory failure, renal failure, circulatory failure second to COVID-19 pneumonia  -At 12:20 PM call placed to patient's spouse, Alan.  No answer, left voicemail, awaiting callback.  Discussed with nurse, Sara who reports Dr. Landon was able to speak with family and they are currently on the way to the hospital.    -CODE STATUS changes to no CPR/full support interventions.  Family considering transition to comfort measures.  Son traveling in from [place] this afternoon.      Thank you for allowing us to participate in patient's plan of care. Palliative Care Team will continue to follow patient.     Time spent:60 minutes spent reviewing medical and medication records, assessing and examining patient, discussing with family, answering questions, providing some guidance about a plan and documentation of care, and coordinating care with other healthcare members, with > 50% time spent face to face.   25 minutes spent on advance care planning.    Randi Prather, APRN  12/6/2021

## 2021-12-06 NOTE — SIGNIFICANT NOTE
12/06/21 0730   Readings   PEEP Intrinsic (cm H2O) 15 cm H2O   Plateau Pressure (cm H2O) 36 cm H2O   Static Compliance (L/cm H2O) 9   Dynamic Compliance (L/cm H2O) 20 L/cm H2O

## 2021-12-06 NOTE — PROGRESS NOTES
INFECTIOUS DISEASES PROGRESS NOTE    Patient:  Carlton Ritter  YOB: 1950  MRN: [id number]   Admit date: 11/15/2021   Admitting Physician: Aravind Landon MD  Primary Care Physician: Jose Luciano MD      Chief Complaint: Unobtainable from patient on ventilator      Interval History: Patient currently on 3 pressors.  No urine output.  Dosed with vanco and fluconazole 12/5  Remains on meropenem but will need dose adjustment based on worsening renal function.    Not a candidate for renal replacement therapy due to significant hemodynamic instability      Allergies: No Known Allergies    Current Meds:     Current Facility-Administered Medications:   •  acetaminophen (TYLENOL) tablet 650 mg, 650 mg, Oral, Q4H PRN, 650 mg at 11/24/21 0806 **OR** acetaminophen (TYLENOL) suppository 650 mg, 650 mg, Rectal, Q4H PRN, Nikolas Gray MD  •  acetaminophen (TYLENOL) tablet 650 mg, 650 mg, Oral, Q4H PRN **OR** acetaminophen (TYLENOL) suppository 650 mg, 650 mg, Rectal, Q4H PRN, Nikolas Gray MD  •  albuterol sulfate HFA (PROVENTIL HFA;VENTOLIN HFA;PROAIR HFA) inhaler 2 puff, 2 puff, Inhalation, Q4H - RT, Nikolas Gray MD, 2 puff at 12/06/21 0723  •  artificial tears ophthalmic ointment, , Both Eyes, Q4H, Charles Hamilton MD, Given at 12/06/21 0626  •  ascorbic acid (VITAMIN C) tablet 500 mg, 500 mg, Oral, Daily, Nikolas Gray MD, 500 mg at 12/05/21 0818  •  Beneprotein packet 3 packet, 3 packet, Per G Tube, TID, Julito Main MD, 3 packet at 12/05/21 1509  •  benzonatate (TESSALON) capsule 200 mg, 200 mg, Oral, Q4H PRN, Nikolas Gray MD, 200 mg at 11/24/21 0807  •  budesonide-formoterol (SYMBICORT) 160-4.5 MCG/ACT inhaler 2 puff, 2 puff, Inhalation, BID - RT, Cristel, Aliya M, APRN, 2 puff at 12/06/21 0723  •  calcium chloride injection 1 g, 1 g, Intravenous, Once, Mitesh Huff, APRN  •  cholecalciferol (VITAMIN D3) tablet 1,000 Units, 1,000 Units, Oral, Daily, Marina,  Nikolas MARKHAM MD, 1,000 Units at 12/05/21 0817  •  dexamethasone (DECADRON) tablet 6 mg, 6 mg, Oral, Daily, 6 mg at 12/05/21 0816 **OR** dexamethasone (DECADRON) injection 6 mg, 6 mg, Intravenous, Daily, Reji Bhagat MD  •  dextromethorphan polistirex ER (DELSYM) 30 MG/5ML oral suspension 60 mg, 60 mg, Oral, Q12H PRN, Nikolas Gray MD, 60 mg at 11/23/21 2011  •  dextrose (D50W) (25 g/50 mL) IV injection 25 g, 25 g, Intravenous, Q15 Min PRN, Nikolas Gray MD  •  dextrose (D50W) (25 g/50 mL) IV injection 50 mL, 50 mL, Intravenous, Q1H PRN, Aravind Landon MD, 50 mL at 11/26/21 1235  •  dextrose (D50W) (25 g/50 mL) IV injection 50 mL, 50 mL, Intravenous, Once, Mitesh Huff, APRN  •  dextrose (GLUTOSE) oral gel 15 g, 15 g, Oral, Q15 Min PRN, Nikolas Gray MD  •  dextrose 5 % 850 mL with sodium bicarbonate 8.4 % 150 mEq infusion, , Intravenous, Continuous, Mitesh Huff, APRN, Last Rate: 150 mL/hr at 12/06/21 0854, Rate Change at 12/06/21 0854  •  enoxaparin (LOVENOX) syringe 80 mg, 1 mg/kg, Subcutaneous, Q24H, Julito Main MD  •  famotidine (PEPCID) tablet 20 mg, 20 mg, Oral, Daily, Aravind Landon MD, 20 mg at 12/05/21 0817  •  fenofibrate (TRICOR) tablet 48 mg, 48 mg, Oral, Daily, Aravind Landon MD, 48 mg at 12/05/21 0818  •  fentaNYL 2500 mcg/250 mL NS infusion,  mcg/hr, Intravenous, Titrated, Julito Main MD, Last Rate: 20 mL/hr at 12/05/21 2055, 200 mcg/hr at 12/05/21 2055  •  glucagon (human recombinant) (GLUCAGEN DIAGNOSTIC) injection 1 mg, 1 mg, Subcutaneous, Q15 Min PRN, Nikolas Gray MD  •  guaiFENesin (ROBITUSSIN) 100 MG/5ML oral solution 200 mg, 200 mg, Oral, TID, Aliya Fam, APRN, 200 mg at 12/05/21 1509  •  insulin lispro (humaLOG) injection 0-9 Units, 0-9 Units, Subcutaneous, Q6H, Aravind Landon MD, 2 Units at 12/06/21 0037  •  insulin regular (humuLIN R,novoLIN R) injection 10 Units, 10 Units, Intravenous, Once, Mitesh Huff,  APRN  •  melatonin tablet 3 mg, 3 mg, Oral, Nightly, Nikolas Gray MD, 3 mg at 12/04/21 2003  •  meropenem (MERREM) 1 g in sodium chloride 0.9 % 100 mL IVPB-VTB, 1 g, Intravenous, Q12H, Kelsey Zuñiga MD, 1 g at 12/06/21 0626  •  multivitamin with minerals 1 tablet, 1 tablet, Oral, Daily, Nikolas Gray MD, 1 tablet at 12/05/21 1048  •  norepinephrine (LEVOPHED) 8 mg in 250 mL NS infusion (premix), 0.02-0.3 mcg/kg/min, Intravenous, Titrated, Aravind Landon MD, Last Rate: 50.5 mL/hr at 12/06/21 0832, 0.3 mcg/kg/min at 12/06/21 0832  •  ondansetron (ZOFRAN) tablet 4 mg, 4 mg, Oral, Q6H PRN **OR** ondansetron (ZOFRAN) injection 4 mg, 4 mg, Intravenous, Q6H PRN, Nikolas Gray MD, 4 mg at 12/04/21 2041  •  phenylephrine (AMY-SYNEPHRINE) 50 mg in sodium chloride 0.9 % 250 mL infusion, 0.5-3 mcg/kg/min, Intravenous, Titrated, Hernandez Marie DO, Last Rate: 36 mL/hr at 12/06/21 0602, 1.4 mcg/kg/min at 12/06/21 0602  •  polyethylene glycol (MIRALAX) packet 17 g, 17 g, Oral, Daily, Aravind Landon MD, 17 g at 12/01/21 0806  •  propofol (DIPRIVAN) infusion 10 mg/mL 100 mL, 5-75 mcg/kg/min, Intravenous, Titrated, Aravind Landon MD, Last Rate: 37.7 mL/hr at 12/04/21 1908, 70 mcg/kg/min at 12/04/21 1908  •  rocuronium (ZEMURON) injection 22.4 mg, 250 mcg/kg, Intravenous, Once PRN, Charles Hamilton MD  •  sodium chloride nasal spray 2 spray, 2 spray, Each Nare, PRN, Kelsey Zuñiga MD  •  sodium zirconium cyclosilicate (LOKELMA) pack 10 g, 10 g, Oral, TID, Mitesh Huff, APRN  •  thiamine (VITAMIN B-1) tablet 100 mg, 100 mg, Oral, Daily, Julito Main MD, 100 mg at 12/05/21 0818  •  Vasopressin (PITRESSIN) 20 Units in sodium chloride 0.9 % 100 mL infusion, 0.03 Units/min, Intravenous, Continuous, Julito Main MD, Last Rate: 9 mL/hr at 12/06/21 0208, 0.03 Units/min at 12/06/21 0208  •  zinc sulfate (ZINCATE) capsule 220 mg, 220 mg, Oral, Daily, Nikolas Gray MD, 220 mg  "at 21 0816      Review of Systems   Unable to perform ROS: Intubated       Objective     Vital Signs:  Temp (24hrs), Av.3 °F (36.8 °C), Min:97 °F (36.1 °C), Max:99.6 °F (37.6 °C)      /67 (BP Location: Right arm, Patient Position: Lying)   Pulse 104   Temp 98.8 °F (37.1 °C)   Resp (!) 38   Ht 172.7 cm (68\")   Wt 87.8 kg (193 lb 9 oz)   SpO2 (!) 88%   BMI 29.43 kg/m²       Physical Exami  General: Patient was lying in bed in no acute distress.  Eric hugger has been turned off  HEENT: ET tube in place.    Respiratory: synchronous with the vent .  Neuro: Sedated on vent   Psych: Sedated on vent      Results Review:    I reviewed the patient's new clinical results.    Lab Results:  CBC:   Lab Results   Lab 21  0337 21  0349 21  0319 21  [id number]   WBC 17.66* 13.77* 18.61* 16.07* 18.45* 38.35* 39.16*   HEMOGLOBIN 10.8* 10.0* 10.1* 9.5* 9.2* 9.9* 9.0*   HEMATOCRIT 35.6* 33.1* 33.3* 31.0* 30.6* 33.6* 29.5*   PLATELETS 207 168 158 130* 132* 128* 84*   LYMPHOCYTE %  --   --   --   --  7.4* 1.0* 1.0*   MONOCYTES %  --   --   --   --  1.1* 3.0* 5.0   >>>>>>>>>>>>>> bands 11%    CMP:   Lab Results   Lab 21  0608 2141 21  0637   SODIUM 132*   < > 127* 130* 130*   POTASSIUM 5.6*   < > 6.3* 6.8* 7.2*   CHLORIDE 98   < > 92* 93* 92*   CO2 24.0   < > 16.0* 18.0* 17.0*   *   < > 109* 122* 127*   CREATININE 1.36*   < > 2.25* 3.01* 3.40*   CALCIUM 7.7*   < > 7.0* 6.8* 6.5*   BILIRUBIN 0.6  --  1.7*  --  2.7*   ALK PHOS 55  --  60  --  79   ALT (SGPT) 60*  --  >7,000*  --  >7,000*   AST (SGOT) 38  --  >7,000*  --  >7,000*   GLUCOSE 186*   < > 208* 151* 131*    < > = values in this interval not displayed.       COVID LABS:  Results From Last 14 Days   Lab Units 21  0637 21  0641 21  0608 21  0522 21  0522 21  0319 21  0349 21  0319 21  0307 "   CRP mg/dL 7.18* 2.05* 1.50*   < > 2.04*   < > 1.47*   < > 0.68*   PROCALCITONIN ng/mL  --  1.05*  --   --  0.22  --  0.13   < > 0.09   TRIGLYCERIDES mg/dL  --   --   --   --   --   --   --   --  324*    < > = values in this interval not displayed.         Estimated Creatinine Clearance: 22.1 mL/min (A) (by C-G formula based on SCr of 3.4 mg/dL (H)).    Culture Results:    Microbiology Results (last 10 days)     ** No results found for the last 240 hours. **               Radiology:     12/4 11/28      Imaging Results (Last 72 Hours)     Procedure Component Value Units Date/Time    XR Chest 1 View [[id number]] Collected: 12/04/21 1507     Updated: 12/04/21 1512    Narrative:      EXAM: XR CHEST 1 VW- 12/4/2021 2:59 PM CST     HISTORY: worsenig oxygenation; U07.1-COVID-19; J12.82-Pneumonia due to  coronavirus disease 2019; R09.02-Hypoxemia; I10-Essential (primary)  hypertension; J96.01-Acute respiratory failure with hypoxia;  Z86.711-Personal history of pulmonary embolism; R73.9-Hyperglycemia,  unspecified; I26.02-Saddle embolus of pulmonary artery with acute cor  pulmonale; E78.5-Hyperlipidemia, unspecified; R06.00-Dyspnea, unspeci       COMPARISON: November 28, 2021.     TECHNIQUE: Frontal radiograph of the chest     FINDINGS:   Bilateral parenchymal infiltrates are present. This is extensive  pneumonia. There is no improvement from November 28, 2021.. Cardiac  silhouettes moderately enlarged. Endotracheal tube and nasogastric tube  are stable in position..      The osseous structures and surrounding soft tissues demonstrate no acute  abnormality.          Impression:      1. Extensive bilateral airspace filling infiltrates consistent with  pneumonia. There is no improvement from November 28, 2021.        This report was finalized on 12/04/2021 15:09 by Dr. Tobias Freedman MD.                    Assessment/Plan     Active Hospital Problems    Diagnosis    • **Pneumonia due to COVID-19 virus    • Acute  respiratory failure with hypoxia (HCC)    • History of pulmonary embolism    • Hyperglycemia    • Essential hypertension    · Acute respiratory failure secondary to COVID-19 infection patient had been on max BiPAP setting for several days.  Intubated November 24.  Remains on FiO2 of 100%.  O2 sats are improved when compared to yesterday however still in the mid 80s  · Significant decompensation s now requiring 3 pressors and with evidence of shock liver (no improvement today compared to yesterday) and renal failure likely related to hypotension as well as prolonged hypoxemia.  Patient now hypothermic requiring Eric hugger.  Patient in multiorgan failure-pulmonary, liver, renal, circulatory  · Superimposed bacterial pneumonia with  Pseudomonas aeruginosa and Klebsiella.  On appropriate treatment but no improvement in vent settings on meropenem.  Dosed with vancomycin and fluconazole December 5  · Acute kidney injury with refractory hyperkalemia-worsening.  Now patient anuric  · Diabetes mellitus per hemoglobin A1c of 6.7  · New bilateral DVT in patient with history of DVT and PE in 2016.  · Leukocytosis -slightly increased today.  · Thrombocytopenia-new problem -trending down        RECOMMENDATION:   Decrease meropenem to 500 mg iv q 12 given no urine output and worsening renal function  I feel current situation is irreversible    Discussed with THERESA Bunch MD  12/06/21  09:09 CST

## 2021-12-06 NOTE — PLAN OF CARE
Goal Outcome Evaluation:              Outcome Summary: Maximum vent settings with O2 maintaining in the mid 80s throughout shift. Levophed at 0.3. Vasopressin at 0.03. Maintaining a systolic BP around 80 and MAP around 70. BM x1, tolerated the clean up. No urine output for the last 8 hours. Rectal temperature at beginnig of shift 94.8, bear hugger applied. Turned q2. Continue to monitor.

## 2021-12-06 NOTE — PLAN OF CARE
Goal Outcome Evaluation:              Outcome Summary: maximum vent settings with pt maintaining sats in the High 80's to low 90's. Continued with 3 Pressors with maps in the low to mid 70's. Levophed at .3. Vasopressin at set rate of .03. Edgar at 1.1.  25ml of urine output. Bear hugger turned on and off due to varying temperature. Potassium was 6.8, See mar for treatment. Hr normal sinus to sinus tach. Propofol off, Fentanyl continued at 200. Pt does not respond to stimuli. Pupils do react to light. Approximately 300ml of brown and bloody secretions suctioned from stomach.

## 2021-12-06 NOTE — SIGNIFICANT NOTE
12/06/21 0602   Readings   PEEP Intrinsic (cm H2O) 7 cm H2O   Plateau Pressure (cm H2O) 24 cm H2O   Static Compliance (L/cm H2O) 22   Dynamic Compliance (L/cm H2O) 20 L/cm H2O

## 2021-12-07 NOTE — DISCHARGE SUMMARY
[facility]  DEATH SUMMARY       Date of Admission: 11/15/2021  Date of Death:  12/6/2021 at approximately 1838  Primary Care Physician: Jose Luciano MD    Presenting Problem/History of Present Illness:  Pneumonia due to COVID-19 virus [U07.1, J12.82]     Final Death Diagnoses:  Active Hospital Problems    Diagnosis    • **Pneumonia due to COVID-19 virus    • Renal failure    • Liver failure (HCC)    • Peripheral vascular failure (HCC)    • Acute respiratory failure with hypoxia (HCC)    • History of pulmonary embolism    • Hyperglycemia    • Essential hypertension        Hospital Course:  The patient is a 71 y.o. male who presented to [facility] with Covid pneumonia/respiratory failure hypoxia/renal failure/liver failure/cardiovascular failure.      Covid -19-pneumonia/respiratory failure hypoxia.  Patient has been vaccinated with Covid at the end of March Pfizer, no booster shot yet.  Patient is outside window as remdesivir window.  Intubated.  Fentanyl drip.  Propofol drip.    Decadron . Pulmonary consult.  Status post Tocilizumab.  ID consult.  Albuterol inhaler.  Vitamin C.  Melatonin at night.  Multivitamins.  Symbicort.  Vitamin D.  Robitussin.  Zinc.  Tessalon Perle as needed.  Delsym as needed.  Incentive spirometer.  Melatonin at night.   Meropenem.  Fluconazole.  Vancomycin..  Chest x-ray-Extensive bilateral airspace filling infiltrates consistent with pneumonia- no improvement from November 28, 2021.  Ventilation-assist-control, FiO2 100, PEEP 14, tidal volume 450, rate 32.  Maxing out .     Hypotension.  Levophed drip.  Edgar-Synephrine drip.  Vasopressin drip.     Hypertension/hyperlipidemia.  Lipitor.  TriCor.  Hold Cozaar due to hypotension.  Echocardiogram-ejection fraction 61 to 65%, mild concentric hypertrophy, diastolic dysfunction grade 1, no significant valvular dysfunction, normal right ventricle cavity size and  function.     Elevated liver enzyme.  Shock liver.     Hyperkalemia.   Continue Lokelma.   Consult nephrology.  D50/insulin/calcium.     Acute renal failure.  Slightly worsening creatinine.  Nephrology consulted.  Bicarb drip.     Hyponatremia.  Stable.     History of saddle pulmonary embolism with cor pulmonale/DVT.  Lovenox therapeutic.  CTA of the chest-No evidence of pulmonary embolus, extensive bilateral groundglass opacity and nodular consolidation- most suggestive of pneumonia.  Doppler lower extremities- evidence of deep venous thrombosis in the bilateral lower extremities.BLE   RT: (+) semi-mobile thrombus in popliteal vein   LT: (+) gastrocnemius veins      Thrombocytopenia . Decrease in platelets     Reflux . Pepcid.  Zofran as needed.     Obesity.  BMI 31.     Diabetes.  Sliding scale.  Hemoglobin A1c 6.7.     Nutrition.  Thiamine.  Consult nutrition for NG feeding.     Respiratory culture-light Pseudomonas and Klebsiella.  MRSA-negative, respiratory panel-negative, previous blood culture shows no growth 5 days.  Strep pneumo-negative.  Legionella antigen -negative. Covid-19-positive.      Poor prognosis multisystem failure-renal cyst, liver, cardiovascular. Patient passed away 12/6/2021 at 1838.    Electronically signed by Aravind Landon MD, 12/07/21, 07:23 CST.

## 2021-12-07 NOTE — NURSING NOTE
At 1831 patient went into VTACH. Nursing responded promptly to bedside were three family members were present. Given his poor prognosis we asked the family whether they wanted us to cardiovert him. They DECLINED cardioversion. We then quickly paged. Dr. Atkinson who gave an order for 150mg of amio. During this time we were unable to palpate a pulse but could doppler once. After Amio was given he went into PEA. His TOD is 1838. Family was at bedside during entire event.

## 2022-01-07 LAB
QT INTERVAL: 378 MS
QTC INTERVAL: 449 MS

## 2023-01-01 ENCOUNTER — TELEPHONE (OUTPATIENT)
Dept: GASTROENTEROLOGY | Age: 73
End: 2023-01-01